# Patient Record
Sex: FEMALE | Race: WHITE | NOT HISPANIC OR LATINO | Employment: OTHER | URBAN - METROPOLITAN AREA
[De-identification: names, ages, dates, MRNs, and addresses within clinical notes are randomized per-mention and may not be internally consistent; named-entity substitution may affect disease eponyms.]

---

## 2018-01-16 NOTE — RESULT NOTES
Message   Mammogram stable- repeat in 1 year  Verified Results  * MAMMO SCREENING BILATERAL W CAD [id number] 03:46PM Pam Oglesby Order Number: [id number]    - Patient Instructions: To schedule this appointment, please contact Central Scheduling at [phone]  Do not wear any perfume, powder, lotion or deodorant on breast or underarm area  Please bring your doctors order, referral (if needed) and insurance information with you on the day of the test  Failure to bring this information may result in this test being rescheduled  Arrive 15 minutes prior to your appointment time to register  On the day of your test, please bring any prior mammogram or breast studies with you that were not performed at a [facility]  Failure to bring prior exams may result in your test needing to be rescheduled  Test Name Result Flag Reference   MAMMO SCREENING BILATERAL W CAD (Report)     Patient History:   Patient is postmenopausal    No known family history of cancer  Took hormonal contraceptives for 10 years  Patient is a former smoker, and smoked for 52 years  Patient's    BMI is 34 3  Reason for exam: screening (asymptomatic)  Mammo Screening Bilateral W CAD: December 12, 2016 - Check In #:    [de-identified]   Bilateral MLO and CC view(s) were taken  Technologist: ALDO Marie (ALDO)(M)   Prior study comparison: July 13, 2015, digital bilateral    screening mammogram performed at [address] [place] [address]  April 9, 2014, digital bilateral screening mammogram performed at   [address] [place] [address]  December 28, 2012, digital    bilateral screening mammogram performed at [facility]  January 25, 2010, right breast unilateral diagnostic    mammogram, performed at [address] [place] [address]  January 15, 2010, digital bilateral screening mammogram performed   at [address] [place] [address]   March 18, 2004, bilateral    screening mammogram performed at [facility]  There are scattered fibroglandular densities  No dominant soft tissue mass, architectural distortion or    suspicious calcifications are noted in either breast  The skin    and nipple contours are within normal limits  No evidence of malignancy  No significant changes when compared with prior studies  ASSESSMENT: BiRad:1 - Negative     Recommendation:   Routine screening mammogram of both breasts in 1 year  A    reminder letter will be scheduled  Analyzed by CAD     8-10% of cancers will be missed on mammography  Management of a    palpable abnormality must be based on clinical grounds  Patients   will be notified of their results via letter from our facility  Accredited by [employer] and FDA       Transcription Location: [facility]: [id number]     Risk Value(s):   Tyrer-Cuzick 10 Year: 2 524%, Tyrer-Cuzick Lifetime: 7 876%,    Myriad Table: 1 5%, JERICA 5 Year: 0 9%, NCI Lifetime: 5 9%   Signed by:   Juliane Ewing MD   12/13/16

## 2018-01-18 NOTE — MISCELLANEOUS
Message  11/14/2016 - 09:38 - left phone message with x-ray report (no fracture)      Signatures   Electronically signed by : Khloe Holm DO; Nov 14 2016  9:39AM EST                       (Author)

## 2018-05-15 RX ORDER — METOPROLOL SUCCINATE 100 MG/1
TABLET, EXTENDED RELEASE ORAL
Qty: 90 TABLET | Refills: 1 | OUTPATIENT
Start: 2018-05-15

## 2018-05-15 RX ORDER — AMLODIPINE BESYLATE 2.5 MG/1
TABLET ORAL
Qty: 90 TABLET | Refills: 1 | OUTPATIENT
Start: 2018-05-15

## 2018-05-25 NOTE — TELEPHONE ENCOUNTER
Per patient, still considers Dr Giron Hang her physician  Is currently out of town will call back to schedule an appt when she returns home  Has enough meds at this time will request refill at the time schedules appt

## 2018-05-31 PROCEDURE — G0145 SCR C/V CYTO,THINLAYER,RESCR: HCPCS | Performed by: OBSTETRICS & GYNECOLOGY

## 2018-05-31 PROCEDURE — 87624 HPV HI-RISK TYP POOLED RSLT: CPT | Performed by: OBSTETRICS & GYNECOLOGY

## 2018-06-01 ENCOUNTER — LAB REQUISITION (OUTPATIENT)
Dept: LAB | Facility: HOSPITAL | Age: 58
End: 2018-06-01
Payer: COMMERCIAL

## 2018-06-01 DIAGNOSIS — Z01.419 ENCOUNTER FOR GYNECOLOGICAL EXAMINATION WITHOUT ABNORMAL FINDING: ICD-10-CM

## 2018-06-05 LAB — HPV RRNA GENITAL QL NAA+PROBE: NORMAL

## 2018-06-06 LAB
LAB AP GYN PRIMARY INTERPRETATION: NORMAL
Lab: NORMAL

## 2018-06-11 PROCEDURE — 88305 TISSUE EXAM BY PATHOLOGIST: CPT | Performed by: PATHOLOGY

## 2018-06-12 ENCOUNTER — LAB REQUISITION (OUTPATIENT)
Dept: LAB | Facility: HOSPITAL | Age: 58
End: 2018-06-12
Payer: COMMERCIAL

## 2018-06-12 DIAGNOSIS — N95.0 POSTMENOPAUSAL BLEEDING: ICD-10-CM

## 2018-06-26 ENCOUNTER — TRANSCRIBE ORDERS (OUTPATIENT)
Dept: ADMINISTRATIVE | Facility: HOSPITAL | Age: 58
End: 2018-06-26

## 2018-06-26 DIAGNOSIS — Z12.39 SCREENING BREAST EXAMINATION: Primary | ICD-10-CM

## 2018-07-09 ENCOUNTER — HOSPITAL ENCOUNTER (OUTPATIENT)
Dept: RADIOLOGY | Age: 58
Discharge: HOME | End: 2018-07-09
Payer: COMMERCIAL

## 2018-07-09 DIAGNOSIS — Z12.39 SCREENING BREAST EXAMINATION: ICD-10-CM

## 2018-07-09 PROCEDURE — 77067 SCR MAMMO BI INCL CAD: CPT

## 2018-07-20 RX ORDER — METOPROLOL SUCCINATE 100 MG/1
100 TABLET, EXTENDED RELEASE ORAL EVERY MORNING
COMMUNITY
End: 2018-07-24 | Stop reason: SDUPTHER

## 2018-07-20 RX ORDER — AMLODIPINE BESYLATE 2.5 MG/1
1 TABLET ORAL EVERY MORNING
COMMUNITY
Start: 2014-03-19 | End: 2018-09-25 | Stop reason: SDUPTHER

## 2018-07-20 RX ORDER — IBUPROFEN 200 MG
400 TABLET ORAL EVERY 6 HOURS PRN
COMMUNITY

## 2018-07-20 RX ORDER — TRIAMTERENE AND HYDROCHLOROTHIAZIDE 37.5; 25 MG/1; MG/1
1 CAPSULE ORAL EVERY MORNING
COMMUNITY
End: 2019-05-10 | Stop reason: SDUPTHER

## 2018-07-23 ENCOUNTER — ANESTHESIA EVENT (OUTPATIENT)
Dept: PERIOP | Facility: HOSPITAL | Age: 58
End: 2018-07-23
Payer: COMMERCIAL

## 2018-07-24 ENCOUNTER — HOSPITAL ENCOUNTER (OUTPATIENT)
Facility: HOSPITAL | Age: 58
Setting detail: OUTPATIENT SURGERY
Discharge: HOME | End: 2018-07-24
Attending: OBSTETRICS & GYNECOLOGY | Admitting: OBSTETRICS & GYNECOLOGY
Payer: COMMERCIAL

## 2018-07-24 ENCOUNTER — ANESTHESIA (OUTPATIENT)
Dept: PERIOP | Facility: HOSPITAL | Age: 58
End: 2018-07-24
Payer: COMMERCIAL

## 2018-07-24 VITALS
HEART RATE: 74 BPM | TEMPERATURE: 98.8 F | OXYGEN SATURATION: 95 % | BODY MASS INDEX: 46.1 KG/M2 | WEIGHT: 270 LBS | RESPIRATION RATE: 20 BRPM | SYSTOLIC BLOOD PRESSURE: 115 MMHG | HEIGHT: 64 IN | DIASTOLIC BLOOD PRESSURE: 61 MMHG

## 2018-07-24 DIAGNOSIS — I10 ESSENTIAL HYPERTENSION: Primary | ICD-10-CM

## 2018-07-24 DIAGNOSIS — N95.0 POSTMENOPAUSAL BLEEDING: ICD-10-CM

## 2018-07-24 PROBLEM — Z98.890 STATUS POST HYSTEROSCOPY: Status: ACTIVE | Noted: 2018-07-24

## 2018-07-24 LAB
BASOPHILS # BLD AUTO: 0.06 THOUSANDS/ΜL (ref 0–0.1)
BASOPHILS NFR BLD AUTO: 1 % (ref 0–1)
EOSINOPHIL # BLD AUTO: 0.24 THOUSAND/ΜL (ref 0–0.61)
EOSINOPHIL NFR BLD AUTO: 3 % (ref 0–6)
ERYTHROCYTE [DISTWIDTH] IN BLOOD BY AUTOMATED COUNT: 13.5 % (ref 11.6–15.1)
HCT VFR BLD AUTO: 44.9 % (ref 34.8–46.1)
HGB BLD-MCNC: 15 G/DL (ref 11.5–15.4)
LYMPHOCYTES # BLD AUTO: 2.36 THOUSANDS/ΜL (ref 0.6–4.47)
LYMPHOCYTES NFR BLD AUTO: 31 % (ref 14–44)
MCH RBC QN AUTO: 29.9 PG (ref 26.8–34.3)
MCHC RBC AUTO-ENTMCNC: 33.4 G/DL (ref 31.4–37.4)
MCV RBC AUTO: 89 FL (ref 82–98)
MONOCYTES # BLD AUTO: 0.51 THOUSAND/ΜL (ref 0.17–1.22)
MONOCYTES NFR BLD AUTO: 7 % (ref 4–12)
NEUTROPHILS # BLD AUTO: 4.48 THOUSANDS/ΜL (ref 1.85–7.62)
NEUTS SEG NFR BLD AUTO: 59 % (ref 43–75)
NRBC BLD AUTO-RTO: 0 /100 WBCS
PLATELET # BLD AUTO: 243 THOUSANDS/UL (ref 149–390)
PMV BLD AUTO: 10.7 FL (ref 8.9–12.7)
RBC # BLD AUTO: 5.02 MILLION/UL (ref 3.81–5.12)
WBC # BLD AUTO: 7.65 THOUSAND/UL (ref 4.31–10.16)

## 2018-07-24 PROCEDURE — 88305 TISSUE EXAM BY PATHOLOGIST: CPT | Performed by: PATHOLOGY

## 2018-07-24 PROCEDURE — 85025 COMPLETE CBC W/AUTO DIFF WBC: CPT | Performed by: OBSTETRICS & GYNECOLOGY

## 2018-07-24 RX ORDER — METOPROLOL SUCCINATE 100 MG/1
100 TABLET, EXTENDED RELEASE ORAL EVERY MORNING
Qty: 90 TABLET | Refills: 1 | OUTPATIENT
Start: 2018-07-24

## 2018-07-24 RX ORDER — ONDANSETRON 2 MG/ML
INJECTION INTRAMUSCULAR; INTRAVENOUS AS NEEDED
Status: DISCONTINUED | OUTPATIENT
Start: 2018-07-24 | End: 2018-07-24 | Stop reason: SURG

## 2018-07-24 RX ORDER — PROPOFOL 10 MG/ML
INJECTION, EMULSION INTRAVENOUS AS NEEDED
Status: DISCONTINUED | OUTPATIENT
Start: 2018-07-24 | End: 2018-07-24 | Stop reason: SURG

## 2018-07-24 RX ORDER — IBUPROFEN 600 MG/1
600 TABLET ORAL EVERY 6 HOURS PRN
Status: DISCONTINUED | OUTPATIENT
Start: 2018-07-24 | End: 2018-07-24 | Stop reason: HOSPADM

## 2018-07-24 RX ORDER — METOPROLOL SUCCINATE 100 MG/1
100 TABLET, EXTENDED RELEASE ORAL EVERY MORNING
Qty: 90 TABLET | Refills: 0 | Status: SHIPPED | OUTPATIENT
Start: 2018-07-24 | End: 2018-09-25 | Stop reason: SDUPTHER

## 2018-07-24 RX ORDER — MIDAZOLAM HYDROCHLORIDE 1 MG/ML
INJECTION INTRAMUSCULAR; INTRAVENOUS AS NEEDED
Status: DISCONTINUED | OUTPATIENT
Start: 2018-07-24 | End: 2018-07-24 | Stop reason: SURG

## 2018-07-24 RX ORDER — SODIUM CHLORIDE 9 MG/ML
125 INJECTION, SOLUTION INTRAVENOUS CONTINUOUS
Status: DISCONTINUED | OUTPATIENT
Start: 2018-07-24 | End: 2018-07-24 | Stop reason: HOSPADM

## 2018-07-24 RX ORDER — FENTANYL CITRATE 50 UG/ML
INJECTION, SOLUTION INTRAMUSCULAR; INTRAVENOUS AS NEEDED
Status: DISCONTINUED | OUTPATIENT
Start: 2018-07-24 | End: 2018-07-24 | Stop reason: SURG

## 2018-07-24 RX ORDER — KETOROLAC TROMETHAMINE 30 MG/ML
INJECTION, SOLUTION INTRAMUSCULAR; INTRAVENOUS AS NEEDED
Status: DISCONTINUED | OUTPATIENT
Start: 2018-07-24 | End: 2018-07-24 | Stop reason: SURG

## 2018-07-24 RX ORDER — OXYCODONE HYDROCHLORIDE AND ACETAMINOPHEN 5; 325 MG/1; MG/1
1 TABLET ORAL EVERY 4 HOURS PRN
Status: DISCONTINUED | OUTPATIENT
Start: 2018-07-24 | End: 2018-07-24 | Stop reason: HOSPADM

## 2018-07-24 RX ORDER — MAGNESIUM HYDROXIDE 1200 MG/15ML
LIQUID ORAL AS NEEDED
Status: DISCONTINUED | OUTPATIENT
Start: 2018-07-24 | End: 2018-07-24 | Stop reason: HOSPADM

## 2018-07-24 RX ORDER — OXYCODONE HYDROCHLORIDE AND ACETAMINOPHEN 5; 325 MG/1; MG/1
2 TABLET ORAL EVERY 4 HOURS PRN
Status: DISCONTINUED | OUTPATIENT
Start: 2018-07-24 | End: 2018-07-24 | Stop reason: HOSPADM

## 2018-07-24 RX ORDER — SODIUM CHLORIDE, SODIUM LACTATE, POTASSIUM CHLORIDE, CALCIUM CHLORIDE 600; 310; 30; 20 MG/100ML; MG/100ML; MG/100ML; MG/100ML
125 INJECTION, SOLUTION INTRAVENOUS CONTINUOUS
Status: DISCONTINUED | OUTPATIENT
Start: 2018-07-24 | End: 2018-07-24 | Stop reason: HOSPADM

## 2018-07-24 RX ADMIN — KETOROLAC TROMETHAMINE 30 MG: 30 INJECTION, SOLUTION INTRAMUSCULAR at 13:51

## 2018-07-24 RX ADMIN — SODIUM CHLORIDE 125 ML/HR: 0.9 INJECTION, SOLUTION INTRAVENOUS at 12:53

## 2018-07-24 RX ADMIN — ONDANSETRON HYDROCHLORIDE 4 MG: 2 INJECTION, SOLUTION INTRAVENOUS at 13:51

## 2018-07-24 RX ADMIN — IBUPROFEN 600 MG: 600 TABLET, FILM COATED ORAL at 15:45

## 2018-07-24 RX ADMIN — FENTANYL CITRATE 50 MCG: 50 INJECTION, SOLUTION INTRAMUSCULAR; INTRAVENOUS at 13:59

## 2018-07-24 RX ADMIN — PROPOFOL 200 MG: 10 INJECTION, EMULSION INTRAVENOUS at 13:41

## 2018-07-24 RX ADMIN — DEXAMETHASONE SODIUM PHOSPHATE 4 MG: 10 INJECTION INTRAMUSCULAR; INTRAVENOUS at 13:55

## 2018-07-24 RX ADMIN — MIDAZOLAM 2 MG: 1 INJECTION INTRAMUSCULAR; INTRAVENOUS at 13:36

## 2018-07-24 RX ADMIN — FENTANYL CITRATE 50 MCG: 50 INJECTION, SOLUTION INTRAMUSCULAR; INTRAVENOUS at 13:41

## 2018-07-24 NOTE — ANESTHESIA PREPROCEDURE EVALUATION
Review of Systems/Medical History  Patient summary reviewed  Chart reviewed      Cardiovascular  Hypertension on > 1 medication, Valvular heart disease , mitral regurgitation,    Pulmonary       GI/Hepatic    GERD (NO MEDS) well controlled,        Negative  ROS        Endo/Other    Obesity  morbid obesity   GYN  Negative gynecology ROS          Hematology  Negative hematology ROS      Musculoskeletal  Negative musculoskeletal ROS        Neurology  Negative neurology ROS      Psychology   Negative psychology ROS              Physical Exam    Airway    Mallampati score: II  TM Distance: >3 FB  Neck ROM: full     Dental   No notable dental hx     Cardiovascular  Rhythm: regular, Rate: normal, Cardiovascular exam normal    Pulmonary  Pulmonary exam normal Breath sounds clear to auscultation,     Other Findings        Anesthesia Plan  ASA Score- 3     Anesthesia Type- general with ASA Monitors  Additional Monitors:   Airway Plan:         Plan Factors-Patient not instructed to abstain from smoking on day of procedure  Patient did not smoke on day of surgery  Induction- intravenous  Postoperative Plan-     Informed Consent- Anesthetic plan and risks discussed with patient

## 2018-07-24 NOTE — DISCHARGE INSTRUCTIONS
Dilation and Curettage   WHAT YOU NEED TO KNOW:   Dilation and curettage (D&C) is a procedure to remove tissue from the lining of your uterus  DISCHARGE INSTRUCTIONS:   Call 911 for any of the following:   · You have signs of an allergic reaction, such as hives, trouble breathing, or severe swelling  Seek care immediately if:   · You have heavy vaginal bleeding that soaks 1 pad in 1 hour for 2 hours in a row  · You have a fever higher than 100 4°F (38°C)  · You have abdominal cramps for more than 2 days  · Your pain does not get better, even after treatment  Contact your healthcare provider if:   · You have foul-smelling vaginal discharge  · You do not get your monthly period  · You feel depressed or anxious  · You feel very tired and weak  · You have questions or concerns about your condition or care  Medicines: You may need any of the following:  · Prescription pain medicine  may be given  Do not wait until the pain is severe before you take your medicine  Ask your healthcare provider how to take this medicine safely  · Antibiotics  help fight or prevent a bacterial infection  · Take your medicine as directed  Contact your healthcare provider if you think your medicine is not helping or if you have side effects  Tell him or her if you are allergic to any medicine  Keep a list of the medicines, vitamins, and herbs you take  Include the amounts, and when and why you take them  Bring the list or the pill bottles to follow-up visits  Carry your medicine list with you in case of an emergency  Self-care:   · Use sanitary pads if needed  You may have light bleeding for up to 2 weeks  Do not use tampons  Use sanitary pads instead  This will help prevent a vaginal infection  · Rest as needed  Slowly start to do more each day  Return to your daily activities as directed  · Do not have sex for at least 2 weeks after the procedure    This will help prevent an infection  · Use birth control right after your procedure  Your monthly period should start again in 4 to 8 weeks  During this time, you could still ovulate (release an egg)  Use birth control as directed to prevent pregnancy during this time  Follow up with your healthcare provider as directed:  Write down your questions so you remember to ask them during your visits  © 2017 [employer]  Information is for End User's use only and may not be sold, redistributed or otherwise used for commercial purposes  All illustrations and images included in CareNotes® are the copyrighted property of [employer]  or Silvino Miller  The above information is an  only  It is not intended as medical advice for individual conditions or treatments  Talk to your doctor, nurse or pharmacist before following any medical regimen to see if it is safe and effective for you

## 2018-07-24 NOTE — OP NOTE
OPERATIVE REPORT  PATIENT NAME: Lloyd Morin    :  1960  MRN: [id number]  Pt Location: [facility] OR ROOM 03    SURGERY DATE: 2018    Surgeon(s) and Role:     * Montrell Braga DO - Primary   *Risa Thompson MD - Assistant    Preop Diagnosis:  Postmenopausal bleeding [N95 0]    Post-Op Diagnosis Codes:     * Postmenopausal bleeding [N95 0]    Procedure(s) (LRB):  DILATATION AND CURETTAGE (D&C) WITH HYSTEROSCOPY (N/A)    Specimen(s):  ID Type Source Tests Collected by Time Destination   1 : [facility] Tissue Endometrium TISSUE EXAM Montrell Braga DO 2018 1405        Estimated Blood Loss:   Minimal    Drains: 20cc urine       Anesthesia Type:   LMA    Operative Indications:  Postmenopausal bleeding [N95 0]      Operative Findings:  Patent ostia bilaterally  Grossly normal atrophic appearing endometrium  No polyps or fibroids visualized  Complications:   None    Procedure and Technique:    Brief History    All risks, benefits, and alternatives to the procedure were discussed with the patient and she had the opportunity to ask questions  Informed consent was obtained  Description of Procedure    Patient was taken to the operating room were a time out was performed to confirm correct patient and correct procedure  General LMA anesthesia (LMA) was administered and the patient was positioned on the OR table in the dorsal lithotomy position  All pressure points were padded and a jenny hugger was placed to maintain control of core body temperature  A bimanual exam was performed and the uterus was noted to be anteverted, normal in size and consistency with no palpable adnexal masses or fullness  The patient was prepped and draped in the usual sterile fashion  Operative Technique    A straight catheter was introduced into the bladder, which was drained of 20cc of clear yellow urine   A weighted speculum was inserted into the vagina and a Bandar retractor was used to visualize the anterior lip of the cervix, which was then grasped with a single toothed tenaculum  The uterus was sounded to 7cm  The cervix was serially dilated to 16Fr using Jamel dilators for introduction of the hysteroscope  Hysteroscope was introduced under direct visualization using normal saline solution as the distention media  Hysteroscope was advanced to the uterine fundus and the entire uterine cavity was inspected in a systematic manner  Findings were as noted above  Hysteroscope was withdrawn and sharp curetting was performed, starting at the 12'oclock position and rotating a total of 360 degrees to cover all surfaces  Endometrial tissue was obtained and sent for pathology  The single toothed tenaculum was removed from the anterior lip of the cervix  Good hemostasis was confirmed at the tenaculum puncture sites  Weighted speculum was then removed from the vagina  At the conclusion of the procedure, all needle, sponge, and instrument counts were noted to be correct x2  Patient tolerated the procedure well and was transferred to PACU in stable condition prior to discharge with follow up in 1-2 weeks  Dr Low Villa was present and participated in all key portions of the case        Patient Disposition:  PACU     SIGNATURE: Mayte Espinosa MD  DATE: July 24, 2018  TIME: 2:33 PM

## 2018-07-24 NOTE — TELEPHONE ENCOUNTER
Pt not seen in 2 years-  It does appear she has a procedure scheduled for today so maybe call her later in the week

## 2018-08-13 DIAGNOSIS — I10 ESSENTIAL HYPERTENSION: Primary | ICD-10-CM

## 2018-08-13 RX ORDER — TRIAMTERENE AND HYDROCHLOROTHIAZIDE 37.5; 25 MG/1; MG/1
TABLET ORAL
Qty: 90 TABLET | Refills: 1 | Status: SHIPPED | OUTPATIENT
Start: 2018-08-13 | End: 2018-09-25 | Stop reason: SDUPTHER

## 2018-09-24 RX ORDER — ASPIRIN 325 MG
1 TABLET ORAL DAILY
COMMUNITY
Start: 2012-11-12 | End: 2019-09-10

## 2018-09-24 RX ORDER — MULTIVIT-MIN/IRON/FOLIC ACID/K 18-600-40
1 CAPSULE ORAL DAILY
COMMUNITY

## 2018-09-25 ENCOUNTER — OFFICE VISIT (OUTPATIENT)
Dept: FAMILY MEDICINE CLINIC | Facility: CLINIC | Age: 58
End: 2018-09-25
Payer: COMMERCIAL

## 2018-09-25 VITALS
HEART RATE: 100 BPM | DIASTOLIC BLOOD PRESSURE: 60 MMHG | WEIGHT: 273.6 LBS | SYSTOLIC BLOOD PRESSURE: 104 MMHG | TEMPERATURE: 96.9 F | HEIGHT: 64 IN | BODY MASS INDEX: 46.71 KG/M2

## 2018-09-25 DIAGNOSIS — I10 ESSENTIAL HYPERTENSION: Primary | ICD-10-CM

## 2018-09-25 DIAGNOSIS — Z12.11 SCREEN FOR COLON CANCER: ICD-10-CM

## 2018-09-25 DIAGNOSIS — E55.9 VITAMIN D DEFICIENCY: ICD-10-CM

## 2018-09-25 PROCEDURE — 99396 PREV VISIT EST AGE 40-64: CPT | Performed by: INTERNAL MEDICINE

## 2018-09-25 RX ORDER — METOPROLOL SUCCINATE 100 MG/1
100 TABLET, EXTENDED RELEASE ORAL EVERY MORNING
Qty: 90 TABLET | Refills: 0 | Status: SHIPPED | OUTPATIENT
Start: 2018-09-25 | End: 2019-01-16 | Stop reason: SDUPTHER

## 2018-09-25 RX ORDER — TRIAMTERENE AND HYDROCHLOROTHIAZIDE 37.5; 25 MG/1; MG/1
1 TABLET ORAL DAILY
Qty: 90 TABLET | Refills: 0 | Status: SHIPPED | OUTPATIENT
Start: 2018-09-25 | End: 2019-05-10 | Stop reason: SDUPTHER

## 2018-09-25 RX ORDER — AMLODIPINE BESYLATE 2.5 MG/1
2.5 TABLET ORAL EVERY MORNING
Qty: 90 TABLET | Refills: 0 | Status: SHIPPED | OUTPATIENT
Start: 2018-09-25 | End: 2018-12-30 | Stop reason: SDUPTHER

## 2018-09-25 NOTE — PROGRESS NOTES
ASSESSMENT and PLAN:  Rome Hernández is a 62 y o  female with:   Problem List Items Addressed This Visit     Hypertension - Primary     bp stable lose weight          Relevant Medications    metoprolol succinate (TOPROL-XL) 100 mg 24 hr tablet    triamterene-hydrochlorothiazide (MAXZIDE-25) 37 5-25 mg per tablet    amLODIPine (NORVASC) 2 5 mg tablet    Other Relevant Orders    Comprehensive metabolic panel    HEMOGLOBIN A1C W/ EAG ESTIMATION    Lipid panel    TSH, 3rd generation    Vitamin D 25 hydroxy    Vitamin D deficiency     Check level         Screen for colon cancer    Relevant Orders    Occult Blood, Fecal Immunochemical          SUBJECTIVE:  Rome Hernández is a 62 y o  female who presents today with a chief complaint of Hypertension (medication refills)    Patient here for med refill and annual physical  She is worred about her weight; she is not dieting  She is not exercising  She is due for colono  She did see gyn- had d and c  She has family hx of dm2   She gets flu shot at work      Review of Systems   Constitutional: Negative  HENT: Negative  Eyes: Negative  Respiratory: Negative  Cardiovascular: Negative  Gastrointestinal: Negative  Endocrine: Negative  Genitourinary: Negative  Musculoskeletal: Negative  Skin: Negative  Allergic/Immunologic: Negative  Neurological: Negative  Hematological: Negative  Psychiatric/Behavioral: Negative  I have reviewed the patient's PMH, Social History, Medication List and Allergies  OBJECTIVE:  /60 (BP Location: Left arm, Patient Position: Sitting, Cuff Size: Large)   Pulse 100   Temp (!) 96 9 °F (36 1 °C)   Ht 5' 4" (1 626 m)   Wt 124 kg (273 lb 9 6 oz)   Breastfeeding? No   BMI 46 96 kg/m²   Physical Exam   Constitutional: She is oriented to person, place, and time  She appears well-developed and well-nourished  HENT:   Head: Normocephalic     Right Ear: External ear normal    Left Ear: External ear normal    Nose: Nose normal    Mouth/Throat: Oropharynx is clear and moist    Eyes: Conjunctivae and EOM are normal  Pupils are equal, round, and reactive to light  No scleral icterus  Neck: Normal range of motion  Neck supple  No JVD present  No tracheal deviation present  No thyromegaly present  Cardiovascular: Normal rate, regular rhythm, normal heart sounds and intact distal pulses  Pulmonary/Chest: Effort normal and breath sounds normal  No respiratory distress  She has no wheezes  She has no rales  Abdominal: Soft  Bowel sounds are normal  She exhibits no distension  There is no tenderness  Musculoskeletal: Normal range of motion  She exhibits no edema or tenderness  Neurological: She is alert and oriented to person, place, and time  Skin: Skin is warm and dry  Psychiatric: She has a normal mood and affect  Her behavior is normal  Judgment normal    Nursing note and vitals reviewed

## 2018-10-19 LAB — HBA1C MFR BLD HPLC: 6.1 %

## 2018-10-29 ENCOUNTER — TELEPHONE (OUTPATIENT)
Dept: FAMILY MEDICINE CLINIC | Facility: CLINIC | Age: 58
End: 2018-10-29

## 2018-10-29 DIAGNOSIS — E55.9 VITAMIN D DEFICIENCY: Primary | ICD-10-CM

## 2018-10-30 RX ORDER — ERGOCALCIFEROL 1.25 MG/1
50000 CAPSULE ORAL WEEKLY
Qty: 8 CAPSULE | Refills: 0 | Status: SHIPPED | OUTPATIENT
Start: 2018-10-30 | End: 2021-02-22 | Stop reason: ALTCHOICE

## 2018-10-30 NOTE — TELEPHONE ENCOUNTER
She can continue that and I am also sending in high dose Vit D supplements, 50,000 IU to take once/week    Thanks

## 2018-10-30 NOTE — TELEPHONE ENCOUNTER
Please call Will Cristian and let her know that her labs showed:  1  She has pre-diabetes, she should decrease carbs in her diet and try to lose weight  2  Her Vit D is low, once she gets the message, I will send in high dose weekly supplements  3  Her electrolytes, kidney function, liver function, thyroid and cholesterol are all in the normal range! Thank you! No visits with results within 1 Week(s) from this visit     Latest known visit with results is:   Orders Only on 10/19/2018   Component Date Value Ref Range Status    Hemoglobin A1C 10/19/2018 6 1   Final

## 2018-10-30 NOTE — TELEPHONE ENCOUNTER
Pt called back and received message   She states she already takes 2000 units of vitamin almost daily(sometimes she forgets!)

## 2018-12-30 DIAGNOSIS — I10 ESSENTIAL HYPERTENSION: ICD-10-CM

## 2018-12-31 RX ORDER — AMLODIPINE BESYLATE 2.5 MG/1
2.5 TABLET ORAL EVERY MORNING
Qty: 90 TABLET | Refills: 0 | Status: SHIPPED | OUTPATIENT
Start: 2018-12-31 | End: 2019-04-24 | Stop reason: SDUPTHER

## 2019-01-16 DIAGNOSIS — I10 ESSENTIAL HYPERTENSION: ICD-10-CM

## 2019-01-17 RX ORDER — METOPROLOL SUCCINATE 100 MG/1
100 TABLET, EXTENDED RELEASE ORAL EVERY MORNING
Qty: 90 TABLET | Refills: 0 | Status: SHIPPED | OUTPATIENT
Start: 2019-01-17 | End: 2019-04-15 | Stop reason: SDUPTHER

## 2019-01-18 DIAGNOSIS — E55.9 VITAMIN D DEFICIENCY: ICD-10-CM

## 2019-01-18 RX ORDER — ERGOCALCIFEROL 1.25 MG/1
50000 CAPSULE ORAL WEEKLY
Qty: 8 CAPSULE | Refills: 0 | OUTPATIENT
Start: 2019-01-18 | End: 2019-03-09

## 2019-04-15 DIAGNOSIS — I10 ESSENTIAL HYPERTENSION: ICD-10-CM

## 2019-04-15 RX ORDER — METOPROLOL SUCCINATE 100 MG/1
TABLET, EXTENDED RELEASE ORAL
Qty: 90 TABLET | Refills: 0 | Status: SHIPPED | OUTPATIENT
Start: 2019-04-15 | End: 2019-07-15 | Stop reason: SDUPTHER

## 2019-04-24 DIAGNOSIS — I10 ESSENTIAL HYPERTENSION: ICD-10-CM

## 2019-04-24 RX ORDER — AMLODIPINE BESYLATE 2.5 MG/1
2.5 TABLET ORAL EVERY MORNING
Qty: 90 TABLET | Refills: 1 | Status: SHIPPED | OUTPATIENT
Start: 2019-04-24 | End: 2020-02-21

## 2019-05-10 DIAGNOSIS — I10 ESSENTIAL HYPERTENSION: Primary | ICD-10-CM

## 2019-05-10 RX ORDER — TRIAMTERENE AND HYDROCHLOROTHIAZIDE 37.5; 25 MG/1; MG/1
1 CAPSULE ORAL EVERY MORNING
Qty: 90 CAPSULE | Refills: 1 | Status: SHIPPED | OUTPATIENT
Start: 2019-05-10 | End: 2019-11-10

## 2019-05-10 RX ORDER — TRIAMTERENE AND HYDROCHLOROTHIAZIDE 37.5; 25 MG/1; MG/1
1 TABLET ORAL DAILY
Qty: 90 TABLET | Refills: 0 | Status: SHIPPED | OUTPATIENT
Start: 2019-05-10 | End: 2019-08-06 | Stop reason: SDUPTHER

## 2019-07-15 DIAGNOSIS — I10 ESSENTIAL HYPERTENSION: ICD-10-CM

## 2019-07-15 RX ORDER — METOPROLOL SUCCINATE 100 MG/1
TABLET, EXTENDED RELEASE ORAL
Qty: 90 TABLET | Refills: 0 | Status: SHIPPED | OUTPATIENT
Start: 2019-07-15 | End: 2019-11-16 | Stop reason: SDUPTHER

## 2019-08-06 DIAGNOSIS — I10 ESSENTIAL HYPERTENSION: ICD-10-CM

## 2019-08-06 RX ORDER — TRIAMTERENE AND HYDROCHLOROTHIAZIDE 37.5; 25 MG/1; MG/1
TABLET ORAL
Qty: 90 TABLET | Refills: 0 | Status: SHIPPED | OUTPATIENT
Start: 2019-08-06 | End: 2019-11-05 | Stop reason: ALTCHOICE

## 2019-08-29 ENCOUNTER — APPOINTMENT (EMERGENCY)
Dept: RADIOLOGY | Facility: HOSPITAL | Age: 59
End: 2019-08-29
Payer: COMMERCIAL

## 2019-08-29 ENCOUNTER — HOSPITAL ENCOUNTER (EMERGENCY)
Facility: HOSPITAL | Age: 59
Discharge: HOME | End: 2019-08-29
Attending: EMERGENCY MEDICINE | Admitting: EMERGENCY MEDICINE
Payer: COMMERCIAL

## 2019-08-29 VITALS
RESPIRATION RATE: 18 BRPM | SYSTOLIC BLOOD PRESSURE: 117 MMHG | HEART RATE: 66 BPM | WEIGHT: 268.96 LBS | BODY MASS INDEX: 45.92 KG/M2 | DIASTOLIC BLOOD PRESSURE: 56 MMHG | OXYGEN SATURATION: 96 % | HEIGHT: 64 IN | TEMPERATURE: 98 F

## 2019-08-29 DIAGNOSIS — R07.9 CHEST PAIN: ICD-10-CM

## 2019-08-29 DIAGNOSIS — S92.309A METATARSAL FRACTURE: Primary | ICD-10-CM

## 2019-08-29 LAB
ALBUMIN SERPL BCP-MCNC: 4.1 G/DL (ref 3.5–5)
ALP SERPL-CCNC: 66 U/L (ref 46–116)
ALT SERPL W P-5'-P-CCNC: 39 U/L (ref 12–78)
ANION GAP SERPL CALCULATED.3IONS-SCNC: 10 MMOL/L (ref 4–13)
AST SERPL W P-5'-P-CCNC: 21 U/L (ref 5–45)
BASOPHILS # BLD AUTO: 0.11 THOUSANDS/ΜL (ref 0–0.1)
BASOPHILS NFR BLD AUTO: 1 % (ref 0–1)
BILIRUB SERPL-MCNC: 0.4 MG/DL (ref 0.2–1)
BUN SERPL-MCNC: 23 MG/DL (ref 5–25)
CALCIUM SERPL-MCNC: 9.3 MG/DL (ref 8.3–10.1)
CHLORIDE SERPL-SCNC: 105 MMOL/L (ref 100–108)
CO2 SERPL-SCNC: 25 MMOL/L (ref 21–32)
CREAT SERPL-MCNC: 1.16 MG/DL (ref 0.6–1.3)
EOSINOPHIL # BLD AUTO: 0.37 THOUSAND/ΜL (ref 0–0.61)
EOSINOPHIL NFR BLD AUTO: 4 % (ref 0–6)
ERYTHROCYTE [DISTWIDTH] IN BLOOD BY AUTOMATED COUNT: 13 % (ref 11.6–15.1)
GFR SERPL CREATININE-BSD FRML MDRD: 52 ML/MIN/1.73SQ M
GLUCOSE SERPL-MCNC: 95 MG/DL (ref 65–140)
HCT VFR BLD AUTO: 45.6 % (ref 34.8–46.1)
HGB BLD-MCNC: 14.9 G/DL (ref 11.5–15.4)
IMM GRANULOCYTES # BLD AUTO: 0.03 THOUSAND/UL (ref 0–0.2)
IMM GRANULOCYTES NFR BLD AUTO: 0 % (ref 0–2)
LYMPHOCYTES # BLD AUTO: 3.28 THOUSANDS/ΜL (ref 0.6–4.47)
LYMPHOCYTES NFR BLD AUTO: 36 % (ref 14–44)
MCH RBC QN AUTO: 29.6 PG (ref 26.8–34.3)
MCHC RBC AUTO-ENTMCNC: 32.7 G/DL (ref 31.4–37.4)
MCV RBC AUTO: 91 FL (ref 82–98)
MONOCYTES # BLD AUTO: 0.6 THOUSAND/ΜL (ref 0.17–1.22)
MONOCYTES NFR BLD AUTO: 7 % (ref 4–12)
NEUTROPHILS # BLD AUTO: 4.69 THOUSANDS/ΜL (ref 1.85–7.62)
NEUTS SEG NFR BLD AUTO: 52 % (ref 43–75)
NRBC BLD AUTO-RTO: 0 /100 WBCS
PLATELET # BLD AUTO: 279 THOUSANDS/UL (ref 149–390)
PMV BLD AUTO: 10.2 FL (ref 8.9–12.7)
POTASSIUM SERPL-SCNC: 3.6 MMOL/L (ref 3.5–5.3)
PROT SERPL-MCNC: 8.2 G/DL (ref 6.4–8.2)
RBC # BLD AUTO: 5.03 MILLION/UL (ref 3.81–5.12)
SODIUM SERPL-SCNC: 140 MMOL/L (ref 136–145)
TROPONIN I SERPL-MCNC: <0.02 NG/ML
WBC # BLD AUTO: 9.08 THOUSAND/UL (ref 4.31–10.16)

## 2019-08-29 PROCEDURE — 71046 X-RAY EXAM CHEST 2 VIEWS: CPT

## 2019-08-29 PROCEDURE — 93005 ELECTROCARDIOGRAM TRACING: CPT

## 2019-08-29 PROCEDURE — 99283 EMERGENCY DEPT VISIT LOW MDM: CPT | Performed by: INTERNAL MEDICINE

## 2019-08-29 PROCEDURE — 84484 ASSAY OF TROPONIN QUANT: CPT | Performed by: EMERGENCY MEDICINE

## 2019-08-29 PROCEDURE — 36415 COLL VENOUS BLD VENIPUNCTURE: CPT

## 2019-08-29 PROCEDURE — 99285 EMERGENCY DEPT VISIT HI MDM: CPT

## 2019-08-29 PROCEDURE — 73630 X-RAY EXAM OF FOOT: CPT

## 2019-08-29 PROCEDURE — 85025 COMPLETE CBC W/AUTO DIFF WBC: CPT | Performed by: EMERGENCY MEDICINE

## 2019-08-29 PROCEDURE — 80053 COMPREHEN METABOLIC PANEL: CPT | Performed by: EMERGENCY MEDICINE

## 2019-08-29 RX ORDER — NAPROXEN 500 MG/1
500 TABLET ORAL 2 TIMES DAILY WITH MEALS
Qty: 30 TABLET | Refills: 0 | Status: SHIPPED | OUTPATIENT
Start: 2019-08-29 | End: 2019-11-05 | Stop reason: ALTCHOICE

## 2019-08-30 LAB
ATRIAL RATE: 67 BPM
P AXIS: 39 DEGREES
PR INTERVAL: 150 MS
QRS AXIS: 10 DEGREES
QRSD INTERVAL: 84 MS
QT INTERVAL: 394 MS
QTC INTERVAL: 416 MS
T WAVE AXIS: 44 DEGREES
VENTRICULAR RATE: 67 BPM

## 2019-08-30 PROCEDURE — 93010 ELECTROCARDIOGRAM REPORT: CPT | Performed by: INTERNAL MEDICINE

## 2019-08-30 NOTE — DISCHARGE INSTRUCTIONS
Please follow up with cardiology regarding your chest pain, return to the ER if it becomes associated with activity or exertion, nausea, vomiting or feeling faint/sweaty or if you have any new concerns  Please follow up with orthopedics regarding your foot fracture  Stay off of it until they evaluate you

## 2019-08-30 NOTE — ED PROVIDER NOTES
History  Chief Complaint   Patient presents with    Foot Injury     Pt reports missing a step and twisting her R foot   Chest Pain     Pt states chest heaviness/pain on L side starting earlier today, states she planned on coming in after work, but then injured her foot as well  +nausea      This 51-year-old female with a history of hypertension and mitral regurgitation presented to the emergency department for evaluation of chest discomfort and heaviness with shortness of breath  She also had a fall earlier today which resulted in a right foot injury  She describes her chest pain as a left-sided discomfort/heaviness, which was constant today  She also noticed that her skin was cold and clammy, and she was feeling nauseated this morning  She did not have shortness of breath with the onset of these symptoms  However, today she had brief period of shortness of breath 15 seconds while she was washing her have which is unusual for her  She can usually climb 2 flights of status before she gets short of breath  She has been having on and off chest discomfort unrelated to exertion for the past couple of years, however she did not think much of it  She also has on and off palpitations for a few years  She denies orthopnea, paroxysmal nocturnal dyspnea, fever, chills, cough  There is occasional wheezing which she believes is exercise induced  No recent trauma to her chest   She has not been diagnosed of GERD, however there have been occasional reflux and she believes she could be having reflux disease as well  She also had a fall today after missing a step  She has been experiencing right ankle pain, with occasional throbbing in her toes  She denies numbness or tingling of her toes, or excessive pain at rest  She was able to bear weight on her right foot with support after the fall      Heart score - 3 (low risk)      History provided by:  Patient   used: No        Prior to Admission Medications   Prescriptions Last Dose Informant Patient Reported? Taking? Cholecalciferol (VITAMIN D) 2000 units CAPS 8/29/2019 at Unknown time  Yes Yes   Sig: Take 1 tablet by mouth daily   amLODIPine (NORVASC) 2 5 mg tablet 8/29/2019 at Unknown time  No Yes   Sig: TAKE 1 TABLET (2 5 MG TOTAL) BY MOUTH EVERY MORNING   aspirin 325 mg tablet 8/29/2019 at Unknown time  Yes Yes   Sig: Take 1 tablet by mouth daily   ergocalciferol (VITAMIN D2) 50,000 units   No No   Sig: Take 1 capsule (50,000 Units total) by mouth once a week for 8 doses   ibuprofen (MOTRIN) 200 mg tablet 8/29/2019 at Unknown time  Yes Yes   Sig: Take 400 mg by mouth every 6 (six) hours as needed for mild pain   metoprolol succinate (TOPROL-XL) 100 mg 24 hr tablet 8/29/2019 at Unknown time  No Yes   Sig: TAKE 1 TABLET BY MOUTH EVERY DAY IN THE MORNING   triamterene-hydrochlorothiazide (DYAZIDE) 37 5-25 mg per capsule 8/29/2019 at Unknown time  No Yes   Sig: Take 1 capsule by mouth every morning   triamterene-hydrochlorothiazide (MAXZIDE-25) 37 5-25 mg per tablet 8/29/2019 at Unknown time  No Yes   Sig: TAKE 1 TABLET BY MOUTH EVERY DAY      Facility-Administered Medications: None       Past Medical History:   Diagnosis Date    GERD (gastroesophageal reflux disease)     No medications    History of cervical dysplasia     History cryosurgery    Hypertension     Mitral regurgitation     Obesity     Postmenopausal bleeding     Rash     On left upper thigh and under abdominal fold- is seen by dermatologist    Stress incontinence     Thrombophlebitis Early 1990's    Superficial, right calf, from birth control pill    Wears glasses        Past Surgical History:   Procedure Laterality Date    [place] HYSTEROSCOPY,W/ENDO BX N/A 7/24/2018    Procedure: DILATATION AND CURETTAGE (D&C) WITH HYSTEROSCOPY;  Surgeon:  Moni Perez DO;  Location: [facility];  Service: Gynecology    WISDOM TOOTH EXTRACTION         Family History   Problem Relation Age of Onset    Coronary artery disease Sister         started with CAD in her 42's     I have reviewed and agree with the history as documented  Social History     Tobacco Use    Smoking status: Former Smoker     Packs/day: 0 75     Years: 20 00     Pack years: 15 00     Types: Cigarettes     Last attempt to quit:      Years since quittin 6    Smokeless tobacco: Never Used    Tobacco comment: 1 ppd x15 years    Substance Use Topics    Alcohol use: No     Comment: being a social drinker noted in "allscripts"     Drug use: No        Review of Systems   Constitutional: Positive for fatigue  Negative for activity change, appetite change, diaphoresis, fever and unexpected weight change  HENT: Negative  Negative for congestion and rhinorrhea  Eyes: Negative  Negative for photophobia and visual disturbance  Respiratory: Positive for chest tightness (Left-sided) and shortness of breath  Negative for cough, choking, wheezing ( occasional wheezing) and stridor  Cardiovascular: Positive for palpitations (for years  Has not been diagnosed of arrhythmias)  Negative for chest pain and leg swelling  Gastrointestinal: Positive for nausea  Negative for constipation, diarrhea (once this morning) and vomiting  Endocrine: Negative  Genitourinary: Negative  Negative for dysuria, enuresis, flank pain, frequency, hematuria and urgency  Musculoskeletal: Negative  Negative for arthralgias and myalgias  Pain in the lateral aspect of the foot following a fall   Skin: Negative  Negative for rash  Allergic/Immunologic: Negative  Neurological: Negative  Negative for dizziness, numbness and headaches  Hematological: Negative  Psychiatric/Behavioral: Negative  All other systems reviewed and are negative        Physical Exam  ED Triage Vitals   Temperature Pulse Respirations Blood Pressure SpO2   19   97 8 °F (36 6 °C) 68 18 157/75 97 %      Temp Source Heart Rate Source Patient Position - Orthostatic VS BP Location FiO2 (%)   08/29/19 2001 08/29/19 1958 08/29/19 1958 08/29/19 1958 --   Oral Monitor Sitting Left arm       Pain Score       08/29/19 1958       6             Orthostatic Vital Signs  Vitals:    08/29/19 1958 08/29/19 2015   BP: 157/75 (!) 172/81   Pulse: 68 80   Patient Position - Orthostatic VS: Sitting Sitting       Physical Exam   Constitutional: She is oriented to person, place, and time  She appears well-developed  HENT:   Head: Normocephalic and atraumatic  Eyes: Pupils are equal, round, and reactive to light  EOM are normal    Neck: Normal range of motion  Cardiovascular: Normal rate, regular rhythm, intact distal pulses and normal pulses  No murmur heard  Pulses:       Dorsalis pedis pulses are 2+ on the right side, and 2+ on the left side  Posterior tibial pulses are 2+ on the right side, and 2+ on the left side  Soft heart sounds   Pulmonary/Chest: Effort normal and breath sounds normal  She has no wheezes  She has no rales  Abdominal: Soft  Bowel sounds are normal  She exhibits no distension  There is no tenderness  Musculoskeletal: Normal range of motion  There is tenderness to palpation on the lateral aspect of her right foot  No color change  Intact distal pulses  No sensory loss  Neurological: She is alert and oriented to person, place, and time  Skin: Skin is warm  Capillary refill takes less than 2 seconds  No rash noted  Nursing note and vitals reviewed        ED Medications  Medications - No data to display    Diagnostic Studies  Results Reviewed     Procedure Component Value Units Date/Time    Troponin I [[id number]]  (Normal) Collected:  08/29/19 2034    Lab Status:  Final result Specimen:  Blood from Arm, Right Updated:  08/29/19 2118     Troponin I <0 02 ng/mL     Comprehensive metabolic panel [[id number]] Collected:  08/29/19 2034    Lab Status:  Final result Specimen:  Blood from Arm, Right Updated:  08/29/19 2056     Sodium 140 mmol/L      Potassium 3 6 mmol/L      Chloride 105 mmol/L      CO2 25 mmol/L      ANION GAP 10 mmol/L      BUN 23 mg/dL      Creatinine 1 16 mg/dL      Glucose 95 mg/dL      Calcium 9 3 mg/dL      AST 21 U/L      ALT 39 U/L      Alkaline Phosphatase 66 U/L      Total Protein 8 2 g/dL      Albumin 4 1 g/dL      Total Bilirubin 0 40 mg/dL      eGFR 52 ml/min/1 73sq m     Narrative:       [place] guidelines for Chronic Kidney Disease (CKD):     Stage 1 with normal or high GFR (GFR > 90 mL/min/1 73 square meters)    Stage 2 Mild CKD (GFR = 60-89 mL/min/1 73 square meters)    Stage 3A Moderate CKD (GFR = 45-59 mL/min/1 73 square meters)    Stage 3B Moderate CKD (GFR = 30-44 mL/min/1 73 square meters)    Stage 4 Severe CKD (GFR = 15-29 mL/min/1 73 square meters)    Stage 5 End Stage CKD (GFR <15 mL/min/1 73 square meters)  Note: GFR calculation is accurate only with a steady state creatinine    CBC and differential [[id number]]  (Abnormal) Collected:  08/29/19 2034    Lab Status:  Final result Specimen:  Blood from Arm, Right Updated:  08/29/19 2039     WBC 9 08 Thousand/uL      RBC 5 03 Million/uL      Hemoglobin 14 9 g/dL      Hematocrit 45 6 %      MCV 91 fL      MCH 29 6 pg      MCHC 32 7 g/dL      RDW 13 0 %      MPV 10 2 fL      Platelets 690 Thousands/uL      nRBC 0 /100 WBCs      Neutrophils Relative 52 %      Immat GRANS % 0 %      Lymphocytes Relative 36 %      Monocytes Relative 7 %      Eosinophils Relative 4 %      Basophils Relative 1 %      Neutrophils Absolute 4 69 Thousands/µL      Immature Grans Absolute 0 03 Thousand/uL      Lymphocytes Absolute 3 28 Thousands/µL      Monocytes Absolute 0 60 Thousand/µL      Eosinophils Absolute 0 37 Thousand/µL      Basophils Absolute 0 11 Thousands/µL     Protime-INR [[id number]]     Lab Status:  No result Specimen:  Blood     APTT [[id number]]     Lab Status:  No result Specimen:  Blood                  XR foot 3+ views RIGHT    (Results Pending)   XR chest 2 views    (Results Pending)         Procedures  Procedures        ED Course  ED Course as of Aug 29 2157   Thu Aug 29, 2019   2146 X ray right foot - Fracture (davies fracture) in the 5th metatarsal bone  MDM  Number of Diagnoses or Management Options     Amount and/or Complexity of Data Reviewed  Clinical lab tests: ordered  Tests in the radiology section of CPT®: ordered  Tests in the medicine section of CPT®: ordered        Disposition  Final diagnoses:   None     ED Disposition     None      Follow-up Information    None         Patient's Medications   Discharge Prescriptions    No medications on file     No discharge procedures on file  ED Provider  Attending physically available and evaluated [facility]  ANNIKA managed the patient along with the ED Attending      Electronically Signed by         Cole Be MD  08/30/19 1181

## 2019-09-03 ENCOUNTER — HOSPITAL ENCOUNTER (OUTPATIENT)
Dept: RADIOLOGY | Facility: HOSPITAL | Age: 59
Discharge: HOME | End: 2019-09-03
Payer: COMMERCIAL

## 2019-09-03 ENCOUNTER — OFFICE VISIT (OUTPATIENT)
Dept: OBGYN CLINIC | Facility: HOSPITAL | Age: 59
End: 2019-09-03
Payer: COMMERCIAL

## 2019-09-03 VITALS
HEART RATE: 65 BPM | WEIGHT: 268 LBS | DIASTOLIC BLOOD PRESSURE: 84 MMHG | SYSTOLIC BLOOD PRESSURE: 128 MMHG | BODY MASS INDEX: 45.75 KG/M2 | HEIGHT: 64 IN

## 2019-09-03 DIAGNOSIS — M79.671 PAIN IN RIGHT FOOT: Primary | ICD-10-CM

## 2019-09-03 DIAGNOSIS — S92.354A CLOSED NONDISPLACED FRACTURE OF FIFTH METATARSAL BONE OF RIGHT FOOT, INITIAL ENCOUNTER: ICD-10-CM

## 2019-09-03 DIAGNOSIS — M79.671 PAIN IN RIGHT FOOT: ICD-10-CM

## 2019-09-03 PROCEDURE — 99203 OFFICE O/P NEW LOW 30 MIN: CPT | Performed by: PHYSICIAN ASSISTANT

## 2019-09-03 PROCEDURE — 73630 X-RAY EXAM OF FOOT: CPT

## 2019-09-03 NOTE — PATIENT INSTRUCTIONS
Ice Pack Application   WHAT YOU NEED TO KNOW:   Ice can be used to decrease swelling and pain after an injury or surgery  Common injuries that may benefit from ice therapy are sprains, strains, and bruises  The use of ice is most effective in the first 1 to 3 days after an injury  DISCHARGE INSTRUCTIONS:   How to apply ice:   · Fill a bag with crushed ice about half full  Remove the air from the bag before you close it  You can also use a bag of frozen vegetables  · Wrap the ice pack in a cloth to protect your skin from frostbite or other injury  · Put the ice over the injured area for 20 to 30 minutes or as long as directed  · Check your skin after about 30 seconds for color changes or blistering  Remove the ice if you notice skin changes or you feel burning or numbness in the area  · Throw the ice pack away after use  · Apply ice to your injured area 4 times each day or as directed  Ask your healthcare provider how many days you should apply ice  Contact your healthcare provider if:   · You see blisters, whitening of your skin, or a bluish color to your skin after using ice  · You feel burning or numbness when using ice  · You have questions about the use of ice packs  © 2017 [facility] Information is for End User's use only and may not be sold, redistributed or otherwise used for commercial purposes  All illustrations and images included in CareNotes® are the copyrighted property of [employer]  or [employer]  The above information is an  only  It is not intended as medical advice for individual conditions or treatments  Talk to your doctor, nurse or pharmacist before following any medical regimen to see if it is safe and effective for you  Safe Use of NSAIDs   WHAT YOU NEED TO KNOW:   NSAIDs are medicines that are used to decrease pain, swelling, and fever  NSAIDs are available with or without a doctor's order   NSAIDs that you can buy without a doctor's order include aspirin, ibuprofen, and naproxen  DISCHARGE INSTRUCTIONS:   Return to the emergency department if:   · You have swelling around your mouth or trouble breathing  · You are breathing fast or you have a fast heartbeat  · You have nausea, vomiting, or abdominal pain  · You have blood in your vomit or bowel movements  · You have a seizure  Contact your healthcare provider if:   · You have a headache or become confused  · You develop hearing loss or ringing in your ears  · You develop itching, a rash, or hives  · You have swelling around your lower legs, feet, ankles, and hands  · You do not know how much NSAIDs to give to your child  · You have questions or concerns about your condition or care  How to give NSAIDs to your child safely:   · Read the directions on the label  Find out if the medicine is right for your child's age and how much to give to your child  The dose for your child's weight or age should be listed  Do not  give your child more than the recommended amount  · Use the measuring tool that came with the medicine  Do not  use another measuring tool, such as a kitchen spoon  Other measuring tools do not provide the right amount of medicine  How to take NSAIDs safely:   · Read the directions on the label to learn how much medicine you should take and often to take it  Do not take more than the recommended amount  · Talk to your healthcare provider if you need take NSAIDs for more than 30 days  The longer you take NSAIDs, the higher your risk of side effects will be  You may need to take other medicines to decrease your risk of side effects such as stomach bleeding  · Do not take an over-the-counter NSAIDs with prescription NSAIDs  The combined amount of NSAIDs may be too high  · Tell your healthcare provider about other medicines you take  Some medicines can increase the risk of side effects from NSAIDs   Your healthcare provider will tell you if it is okay to take NSAIDs and how to take them  Who should not take NSAIDs:  Certain people should avoid or limit NSAIDs  Do not  give NSAIDs to children under 10months of age without direction from your child's doctor  Do not give aspirin to children under 25years of age  Your child could develop Reye syndrome if he takes aspirin  Reye syndrome can cause life-threatening brain and liver damage  Check your child's medicine labels for aspirin, salicylates, or oil of wintergreen  Talk to your healthcare provider before you take NSAIDs if any of the following apply to you:  · You have reflux disease, a peptic ulcer, H pylori infection, or bleeding in your stomach or intestines  · You have a bleeding disorder, or you take blood-thinning medicine  · You are allergic to aspirin or other NSAIDs  · You have liver or kidney or disease  · You have high blood pressure or heart disease  · You have 3 or more alcoholic drinks each day  · You are pregnant  What you need to know about an NSAID overdose:  Certain health problems can occur if you take too much NSAID medicine at one time or over time  Problems include nausea, vomiting, and abdominal pain  You may develop gastritis, peptic ulcers, and stomach bleeding  You may also develop fluid retention, heart problems, and kidney problems  NSAIDs can worsen high blood pressure  You may become confused, or you may have a headache, hearing loss, or hallucinations  An overdose of aspirin may also cause rapid breathing, a rapid heartbeat, or seizures  What to do if you think you or your child took too much NSAID medicine:  Call the [facility] at [phone] immediately  © 2017 [address] [employer]  Information is for End User's use only and may not be sold, redistributed or otherwise used for commercial purposes   All illustrations and images included in CareNotes® are the copyrighted property of [employer] Bruce  or [employer]  The above information is an  only  It is not intended as medical advice for individual conditions or treatments  Talk to your doctor, nurse or pharmacist before following any medical regimen to see if it is safe and effective for you

## 2019-09-03 NOTE — LETTER
September 3, 2019     Patient: Ramirez Matias   YOB: 1960   Date of Visit: 9/3/2019       To Whom it May Concern:    Carlie Kacey is under my professional care  She was seen in my office on 9/3/2019  She may return to work on 9/9/19  Allow to wear boot       If you have any questions or concerns, please don't hesitate to call           Sincerely,          Jimmie Bonds PA-C        CC: Ramirez Matias

## 2019-09-03 NOTE — PROGRESS NOTES
Assessment/Plan   Diagnoses and all orders for this visit:    Pain in right foot  -     XR foot 3+ vw right; Future    Closed nondisplaced fracture of fifth metatarsal bone of right foot, initial encounter    - Low CAM boot fitted and dispensed  Do not drive with the boot on   - Use the scooter this week  - Follow up with Dr Misti Gandhi or  sports medicine  - Ice as needed      Subjective   Patient ID: Clarissa Jack is a 61 y o  female  Vitals:    09/03/19 1200   BP: 128/84   Pulse: 72     58yo female comes in for an evaluation of her right foot  She was injured 8/29/19 when she missed a step and fell  She went to the ER and xrays showed a fracture of the base of the 5th metatarsal   The pain is dull in character, mild in severity, pain does not radiate and is not associated with numbness  She was unable to balance on crutches so she is using a scooter instead  She works as a long-term care nurse and requests a note to stay out of work this week and go back on Monday  This is not work comp  The following portions of the patient's history were reviewed and updated as appropriate: allergies, current medications, past family history, past medical history, past social history, past surgical history and problem list     Review of Systems  Ortho Exam  Past Medical History:   Diagnosis Date    GERD (gastroesophageal reflux disease)     No medications    History of cervical dysplasia     History cryosurgery    Hypertension     Mitral regurgitation     Obesity     Postmenopausal bleeding     Rash     On left upper thigh and under abdominal fold- is seen by dermatologist    Stress incontinence     Thrombophlebitis Early 1990's    Superficial, right calf, from birth control pill    Wears glasses      Past Surgical History:   Procedure Laterality Date    [place] HYSTEROSCOPY,W/ENDO BX N/A 7/24/2018    Procedure: DILATATION AND CURETTAGE (D&C) WITH HYSTEROSCOPY;  Surgeon:  Moni Perez DO;  Location: [facility];  Service: Gynecology    WISDOM TOOTH EXTRACTION       Family History   Problem Relation Age of Onset    Coronary artery disease Sister         started with CAD in her 42's     Social History     Occupational History    Not on file   Tobacco Use    Smoking status: Former Smoker     Packs/day: 0 75     Years: 20 00     Pack years: 15 00     Types: Cigarettes     Last attempt to quit:      Years since quittin 6    Smokeless tobacco: Never Used    Tobacco comment: 1 ppd x15 years    Substance and Sexual Activity    Alcohol use: No     Comment: being a social drinker noted in "allscripts"     Drug use: No    Sexual activity: Not on file       Review of Systems   Constitutional: Negative  HENT: Negative  Eyes: Negative  Respiratory: Negative  Cardiovascular: Negative  Gastrointestinal: Negative  Endocrine: Negative  Genitourinary: Negative  Musculoskeletal: As below      Allergic/Immunologic: Negative  Neurological: Negative  Hematological: Negative  Psychiatric/Behavioral: Negative  Objective   Physical Exam        I have personally reviewed pertinent films in PACS and my interpretation is nondisplaced fracture of the base of the 5th MT  This does not appear to be a true davies fracture         · Constitutional: Awake, Alert, Oriented  · Eyes: EOMI  · Psych: Mood and affect appropriate  · Heart: regular rate and rhythm  · Lungs: No audible wheezing  · Abdomen: soft  · Lymph: no lymphedema             right foot:  - Appearance   No swelling, discoloration, deformity, or ecchymosis  - Palpation   + 5th MT tenderness and Otherwise, no tenderness about the foot or ankle   - ROM   not examined due to fracture status  - Special Tests      - Motor   limited by pain  - NVI distally

## 2019-09-10 ENCOUNTER — OFFICE VISIT (OUTPATIENT)
Dept: OBGYN CLINIC | Facility: CLINIC | Age: 59
End: 2019-09-10
Payer: COMMERCIAL

## 2019-09-10 ENCOUNTER — TELEPHONE (OUTPATIENT)
Dept: OBGYN CLINIC | Facility: HOSPITAL | Age: 59
End: 2019-09-10

## 2019-09-10 VITALS
SYSTOLIC BLOOD PRESSURE: 141 MMHG | HEIGHT: 64 IN | DIASTOLIC BLOOD PRESSURE: 82 MMHG | BODY MASS INDEX: 46 KG/M2 | HEART RATE: 75 BPM

## 2019-09-10 DIAGNOSIS — S92.354A CLOSED NONDISPLACED FRACTURE OF FIFTH METATARSAL BONE OF RIGHT FOOT, INITIAL ENCOUNTER: Primary | ICD-10-CM

## 2019-09-10 PROCEDURE — 99213 OFFICE O/P EST LOW 20 MIN: CPT | Performed by: ORTHOPAEDIC SURGERY

## 2019-09-10 RX ORDER — ASPIRIN 325 MG
325 TABLET, DELAYED RELEASE (ENTERIC COATED) ORAL 2 TIMES DAILY
Qty: 90 TABLET | Refills: 1 | Status: SHIPPED | OUTPATIENT
Start: 2019-09-10 | End: 2021-02-22 | Stop reason: ALTCHOICE

## 2019-09-10 NOTE — PROGRESS NOTES
OLIMPIA Chacon  Attending, Orthopaedic Surgery  Foot and [facility] [address] [facility]        ORTHOPAEDIC FOOT AND ANKLE CLINIC VISIT     Assessment:     Encounter Diagnosis   Name Primary?  Closed nondisplaced fracture of fifth metatarsal bone of right foot, initial encounter Yes              Plan:   · The patient verbalized understanding of exam findings and treatment plan  We engaged in the shared decision-making process and treatment options were discussed at length with the patient  Surgical and conservative management discussed today along with risks and benefits  · She has a nondisplaced fracture of the 5th metatarsal, zone 2  She has been walking in a cam boot since the injury 2 weeks ago  · Typically, we recommend NWB in a cast for 6-8 weeks for this Zone 2 injury  She refuses this treatment and understands the risk that this may lead to a nonunion  · She may continue WBAT in a cam boot  · Given prescription for ASA Ecotrin 325 mg BID for DVT prophylaxis while in the boot  · Recommend taking tylenol for pain  Avoid NSAIDs which may interfere with bone healing  Return in about 5 weeks (around 10/15/2019)  History of Present Illness:   Chief Complaint:   Chief Complaint   Patient presents with   [address] [place] [address] is a 61 y o  female who is being seen for right 5th metatarsal fracture  Patient reports 2 weeks ago, she missed a step and inverted her foot and felt a "pop" in the 5th metatarsal   Pain is localized at base of 5th metatarsal with minimal radiating and described as sharp and severe  She was seen by Wilfrido Martinez PA-C who put her in a cam boot WBAT and instructed to follow up in our clinic for further management  She has been walking in the cam boot without significant pain  Patient denies numbness, tingling or radicular pain  Denies history of neuropathy    Patient does not smoke, does not have diabetes and does not take blood thinners  Patient denies family history of anesthesia complications and has not had any complications with anesthesia  Pain/symptom timing:  Worse during the day when active  Pain/symptom context:  Worse with activites and work  Pain/symptom modifying factors:  Rest makes better, activities make worse  Pain/symptom associated signs/symptoms: none    Prior treatment   · NSAIDsYes    · Injections No   · Bracing/Orthotics Yes - cam boot  · Physical Therapy No     Orthopedic Surgical History:   See below     Past Medical, Surgical and Social History:  Past Medical History:  has a past medical history of GERD (gastroesophageal reflux disease), History of cervical dysplasia, Hypertension, Mitral regurgitation, Obesity, Postmenopausal bleeding, Rash, Stress incontinence, Thrombophlebitis (Early 1990's), and Wears glasses  Problem List: does not have any pertinent problems on file  Past Surgical History:  has a past surgical history that includes [place] tooth extraction and pr hysteroscopy,w/endo bx (N/A, 7/24/2018)  Family History: family history includes Coronary artery disease in her sister  Social History:  reports that she quit smoking about 11 years ago  Her smoking use included cigarettes  She has a 15 00 pack-year smoking history  She has never used smokeless tobacco  She reports that she does not drink alcohol or use drugs  Current Medications: has a current medication list which includes the following prescription(s): amlodipine, vitamin d, ibuprofen, metoprolol succinate, naproxen, triamterene-hydrochlorothiazide, triamterene-hydrochlorothiazide, aspirin, and ergocalciferol  Allergies: is allergic to no active allergies       Review of Systems:  General- denies fever/chills  HEENT- denies hearing loss or sore throat  Eyes- denies eye pain or visual disturbances, denies red eyes  Respiratory- denies cough or SOB  Cardio- denies chest pain or palpitations  GI- denies abdominal pain  Endocrine- denies urinary frequency  Urinary- denies pain with urination  Musculoskeletal- Negative except noted above  Skin- denies rashes or wounds  Neurological- denies dizziness or headache  Psychiatric- denies anxiety or difficulty concentrating    Physical Exam:   /82 (BP Location: Left arm, Patient Position: Sitting, Cuff Size: Adult)   Pulse 75   Ht 5' 4" (1 626 m)   BMI 46 00 kg/m²   General/Constitutional: No apparent distress: well-nourished and well developed  Eyes: normal ocular motion  Lymphatic: No appreciable lymphadenopathy  Respiratory: Non-labored breathing  Vascular: No edema, swelling or tenderness, except as noted in detailed exam   Integumentary: No impressive skin lesions present, except as noted in detailed exam   Neuro: No ataxia or tremors noted  Psych: Normal mood and affect, oriented to person, place and time  Appropriate affect  Musculoskeletal: Normal, except as noted in detailed exam and in HPI  Examination    Right    Gait Ambulating in cam boot   Musculoskeletal Tender to palpation at base of 5th metatarsal at fracture site    Skin Normal       Nails Normal    Range of Motion  20 degrees dorsiflexion, 40 degrees plantarflexion  Subtalar motion: normal    Stability Stable    Muscle Strength 5/5 tibialis anterior  5/5 gastrocnemius-soleus  5/5 posterior tibialis  5/5 peroneal/eversion strength  5/5 EHL  5/5 FHL    Neurologic Normal    Sensation Intact to light touch throughout sural, saphenous, superficial peroneal, deep peroneal and medial/lateral plantar nerve distributions  [place]-Kera 5 07 filament (10g) testing deferred  Cardiovascular Brisk capillary refill < 2 seconds,intact DP and PT pulses    Special Tests None      Imaging Studies:   3 views of the right foot were taken, reviewed and interpreted independently that demonstrate a nondisplaced fracture of the base of the 5th metatarsal, zone 2  Reviewed by me personally      Scribe Attestation    I,:   Timo Galeas PA-C am acting as a scribe while in the presence of the attending physician :        I,:   Yumiko Rico MD personally performed the services described in this documentation    as scribed in my presence :              Imelda Lee Lachman, MD  Foot & Ankle Surgery   Department [address]      I personally performed the service  Imelda Lee Lachman, MD

## 2019-09-10 NOTE — LETTER
September 10, 2019     Patient: Lloyd Morin   YOB: 1960   Date of Visit: 9/10/2019       To Whom it May Concern:    Ileana Colin is under my professional care  She was seen in my office on 9/10/2019  She may return to work with limitations 9/11/2019  She should limit the amount of time on her feet and be allowed to take breaks as needed to elevate the foot at her desk  If you have any questions or concerns, please don't hesitate to call           Sincerely,          Janneth Monzon MD        CC: Lloyd Morin

## 2019-09-10 NOTE — TELEPHONE ENCOUNTER
Lars Calderón   #: [phone]         Patient called in requesting work note and after summary mailed to her home address on file   Please advise, thank you

## 2019-09-10 NOTE — PATIENT INSTRUCTIONS
Recommend taking the following supplements: Vitamin D 4000 units per day and Calcium 1200 mg per day  This will help with bone healing  Avoid taking NSAIDs (naproxen, ibuprofen) for this pain  Take tylenol

## 2019-09-11 ENCOUNTER — TELEPHONE (OUTPATIENT)
Dept: OBGYN CLINIC | Facility: HOSPITAL | Age: 59
End: 2019-09-11

## 2019-09-11 NOTE — TELEPHONE ENCOUNTER
Patient called in  # [phone]    Patient said that she was given a work note to return with restrictions, weight bearing and said that she doesn't think that she is able to return right now and would like a note to be put out of work until her next appointment  Patient would like a callback to discuss  Please advise

## 2019-09-16 NOTE — TELEPHONE ENCOUNTER
Called the patient and she states she is nonweightbearing in the CAM boot  She states she needs a new note to keep her out of work until her next visit  She will print the note out from [address]

## 2019-09-16 NOTE — TELEPHONE ENCOUNTER
Patient called back today  # [phone]    Patient would like to speak to the doctor in regards to a new work note and also would like to let him know what she has been doing regarding her foot  Please advise

## 2019-09-17 NOTE — ED ATTENDING ATTESTATION
Mana Lamar MD, saw and evaluated the patient  I have discussed the patient with the resident/non-physician practitioner and agree with the resident's/non-physician practitioner's findings, Plan of Care, and MDM as documented in the resident's/non-physician practitioner's note, except where noted  All available labs and Radiology studies were reviewed  I was present for key portions of any procedure(s) performed by the resident/non-physician practitioner and I was immediately available to provide assistance  At this point I agree with the current assessment done in the Emergency Department  I have conducted an independent evaluation of this patient a history and physical is as follows:    62 yo female p/w restrosternal non-radiating cp that is intermittent non-exertional x "many months"  Denies prior cardiac work up, only came to ED today 2/2 misstep and subsequent right ankle pain  Physical Exam   Constitutional: She is oriented to person, place, and time  She appears well-developed and well-nourished  HENT:   Head: Normocephalic and atraumatic  Eyes: Conjunctivae are normal    Neck: Normal range of motion  Cardiovascular: Normal rate, regular rhythm, normal heart sounds and intact distal pulses  No murmur heard  Pulmonary/Chest: Effort normal and breath sounds normal  No respiratory distress  Abdominal: Soft  Musculoskeletal: Normal range of motion  She exhibits no deformity  TTP base 5th MT right foot, no bruising or swelling, benign ankle/knee/hip, soft compartmetns, intact distal pulses   Neurological: She is alert and oriented to person, place, and time  Skin: Skin is warm and dry  She is not diaphoretic  Psychiatric: She has a normal mood and affect  Her behavior is normal  Judgment and thought content normal    Nursing note and vitals reviewed          Critical Care Time  Procedures

## 2019-09-17 NOTE — TELEPHONE ENCOUNTER
Patient called stating she needs a letter stating that she can return to work remotely starting 9/18  Please fax to [phone] joelle Castaneda

## 2019-10-22 ENCOUNTER — APPOINTMENT (OUTPATIENT)
Dept: RADIOLOGY | Facility: AMBULARY SURGERY CENTER | Age: 59
End: 2019-10-22
Attending: ORTHOPAEDIC SURGERY
Payer: COMMERCIAL

## 2019-10-22 ENCOUNTER — OFFICE VISIT (OUTPATIENT)
Dept: OBGYN CLINIC | Facility: CLINIC | Age: 59
End: 2019-10-22
Payer: COMMERCIAL

## 2019-10-22 VITALS — HEIGHT: 64 IN | BODY MASS INDEX: 46 KG/M2

## 2019-10-22 DIAGNOSIS — S92.354A CLOSED NONDISPLACED FRACTURE OF FIFTH METATARSAL BONE OF RIGHT FOOT, INITIAL ENCOUNTER: ICD-10-CM

## 2019-10-22 DIAGNOSIS — S92.354A CLOSED NONDISPLACED FRACTURE OF FIFTH METATARSAL BONE OF RIGHT FOOT, INITIAL ENCOUNTER: Primary | ICD-10-CM

## 2019-10-22 PROCEDURE — 73630 X-RAY EXAM OF FOOT: CPT

## 2019-10-22 PROCEDURE — 99213 OFFICE O/P EST LOW 20 MIN: CPT | Performed by: ORTHOPAEDIC SURGERY

## 2019-10-22 NOTE — PROGRESS NOTES
OLIMPIA Booker  Attending, Orthopaedic Surgery  [facility] [address] [facility]      ORTHOPAEDIC FOOT AND ANKLE CLINIC VISIT     Assessment:     Encounter Diagnosis   Name Primary?  Closed nondisplaced fracture of fifth metatarsal bone of right foot, initial encounter Yes            Plan:   · The patient verbalized understanding of exam findings and treatment plan  We engaged in the shared decision-making process and treatment options were discussed at length with the patient  Surgical and conservative management discussed today along with risks and benefits  · Madera Liming can wean out of cam walker boot to a stiff soled shoe  · Continue with Vitamin D and calcium  · She can gradually increase activity to tolerance to pre injury level  · Rest, ice and elevation advised as needed  · She can take Tylenol for pain control as needed  Return if symptoms worsen or fail to improve  History of Present Illness:   Chief Complaint:   Chief Complaint   Patient presents with   [address] is a 61 y o  female who is being seen in follow-up for Right Zone II  5th metatarsal fracture  When we last saw she we recommended weight bearing as tolerated in cam walker boot,  mg BID for DVT prophylaxis, Tylenol for pain control  Pain has significantly improved while in the cam walker boot, she states she put weight on her foot yesterday barefooted and minimal soreness was present  Residual pain is localized at 5th metatarsal base with minimal radiating and described as sharp and severe        Pain/symptom timing:  Worse during the day when active  Pain/symptom context:  Worse with activites and work  Pain/symptom modifying factors:  Rest makes better, activities make worse  Pain/symptom associated signs/symptoms: none    Prior treatment   · NSAIDsNo   · Injections No   · Bracing/Orthotics Yes    · Physical Therapy No     Orthopedic Surgical History:   See below  Past Medical, Surgical and Social History:  Past Medical History:  has a past medical history of GERD (gastroesophageal reflux disease), History of cervical dysplasia, Hypertension, Mitral regurgitation, Obesity, Postmenopausal bleeding, Rash, Stress incontinence, Thrombophlebitis (Early 1990's), and Wears glasses  Problem List: does not have any pertinent problems on file  Past Surgical History:  has a past surgical history that includes [place] tooth extraction and pr hysteroscopy,w/endo bx (N/A, 7/24/2018)  Family History: family history includes Coronary artery disease in her sister  Social History:  reports that she quit smoking about 11 years ago  Her smoking use included cigarettes  She has a 15 00 pack-year smoking history  She has never used smokeless tobacco  She reports that she does not drink alcohol or use drugs  Current Medications: has a current medication list which includes the following prescription(s): amlodipine, aspirin, vitamin d, ibuprofen, metoprolol succinate, naproxen, triamterene-hydrochlorothiazide, triamterene-hydrochlorothiazide, and ergocalciferol  Allergies: is allergic to no active allergies  Review of Systems:  General- denies fever/chills  HEENT- denies hearing loss or sore throat  Eyes- denies eye pain or visual disturbances, denies red eyes  Respiratory- denies cough or SOB  Cardio- denies chest pain or palpitations  GI- denies abdominal pain  Endocrine- denies urinary frequency  Urinary- denies pain with urination  Musculoskeletal- Negative except noted above  Skin- denies rashes or wounds  Neurological- denies dizziness or headache  Psychiatric- denies anxiety or difficulty concentrating    Physical Exam:   Ht 5' 4" (1 626 m)   BMI 46 00 kg/m²   General/Constitutional: No apparent distress: well-nourished and well developed    Eyes: normal ocular motion  Lymphatic: No appreciable lymphadenopathy  Respiratory: Non-labored breathing  Vascular: No edema, swelling or tenderness, except as noted in detailed exam   Integumentary: No impressive skin lesions present, except as noted in detailed exam   Neuro: No ataxia or tremors noted  Psych: Normal mood and affect, oriented to person, place and time  Appropriate affect  Musculoskeletal: Normal, except as noted in detailed exam and in HPI  Examination    Right    Gait Not tested, she presents in cam walker boot  Musculoskeletal  No ttp at the 5th metatarsal base  Skin  Mild swelling 5th metatarsal     Nails Normal    Range of Motion  15 degrees dorsiflexion, 40 degrees plantarflexion  Subtalar motion: wnl    Stability Stable    Muscle Strength 5/5 tibialis anterior  5/5 gastrocnemius-soleus  5/5 posterior tibialis  5/5 peroneal/eversion strength  5/5 EHL  5/5 FHL    Neurologic Normal    Sensation Intact to light touch throughout sural, saphenous, superficial peroneal, deep peroneal and medial/lateral plantar nerve distributions  [place]-Kera 5 07 filament (10g) testing deferred  Cardiovascular Brisk capillary refill < 2 seconds,intact DP and PT pulses    Special Tests None      Imaging Studies:   Xray 3 views right foot:  Demonstrates ongoing healing with consolidation across the fracture site, fracture line remains visible  No displacement noted  Reviewed by me personally      Scribe Attestation    I,:   Arline Crane am acting as a scribe while in the presence of the attending physician :        I,:   Ben Murray MD personally performed the services described in this documentation    as scribed in my presence :              Roena Baar Lachman, MD  Foot & Ankle Surgery   Department of [address] [place] [address]      I personally performed the service  Roena Baar Lachman, MD

## 2019-10-22 NOTE — PATIENT INSTRUCTIONS
You may now begin weaning your boot and transitioning to a sneaker  It is important to do this gradually to avoid aggravating the healing process  1  Today, you may come out of the boot into a sneaker for 2 hours  2  Tomorrow, you may come out of the boot into a sneaker for 4 hours,  3  The next day, you may come out of the boot into a sneaker for 6 hours  4  Continue this (adding 2 hours per day) as you tolerate  For example, if you do 6 hours out of the boot into a sneaker and your foot swells more than usual at night and it is difficult to control the discomfort, do not advance to 8 hours the next day, stay at 6 hours until you are able to tolerate it  Elevation, Ice and tylenol and staying off of it at night will be important to aide in this transition out of the boot  Swelling and soreness are normal as you begin to do more with the injured leg

## 2019-10-22 NOTE — LETTER
October 22, 2019     Patient: Marie Jaime   YOB: 1960   Date of Visit: 10/22/2019       To Whom it May Concern:    Brent Haynes is under my professional care  She was seen in my office on 10/22/2019  She may return to work on 10/22/19 unrestricted full duty       If you have any questions or concerns, please don't hesitate to call           Sincerely,          Mannie Rea MD        CC: Marie Jaime

## 2019-11-05 ENCOUNTER — OFFICE VISIT (OUTPATIENT)
Dept: FAMILY MEDICINE CLINIC | Facility: CLINIC | Age: 59
End: 2019-11-05
Payer: COMMERCIAL

## 2019-11-05 VITALS
OXYGEN SATURATION: 97 % | HEART RATE: 84 BPM | BODY MASS INDEX: 46.17 KG/M2 | DIASTOLIC BLOOD PRESSURE: 86 MMHG | SYSTOLIC BLOOD PRESSURE: 144 MMHG | WEIGHT: 269 LBS | TEMPERATURE: 97.3 F | RESPIRATION RATE: 16 BRPM

## 2019-11-05 DIAGNOSIS — Z12.11 COLON CANCER SCREENING: ICD-10-CM

## 2019-11-05 DIAGNOSIS — Z00.00 ROUTINE ADULT HEALTH MAINTENANCE: Primary | ICD-10-CM

## 2019-11-05 DIAGNOSIS — Z13.220 SCREENING FOR HYPERLIPIDEMIA: ICD-10-CM

## 2019-11-05 DIAGNOSIS — Z12.39 BREAST CANCER SCREENING: ICD-10-CM

## 2019-11-05 DIAGNOSIS — E55.9 VITAMIN D DEFICIENCY: ICD-10-CM

## 2019-11-05 DIAGNOSIS — Z11.59 ENCOUNTER FOR HEPATITIS C SCREENING TEST FOR LOW RISK PATIENT: ICD-10-CM

## 2019-11-05 DIAGNOSIS — I10 ESSENTIAL HYPERTENSION: ICD-10-CM

## 2019-11-05 DIAGNOSIS — R73.03 PRE-DIABETES: ICD-10-CM

## 2019-11-05 DIAGNOSIS — G47.33 OBSTRUCTIVE SLEEP APNEA: ICD-10-CM

## 2019-11-05 PROBLEM — Z98.890 STATUS POST HYSTEROSCOPY: Status: RESOLVED | Noted: 2018-07-24 | Resolved: 2019-11-05

## 2019-11-05 PROCEDURE — 99396 PREV VISIT EST AGE 40-64: CPT | Performed by: FAMILY MEDICINE

## 2019-11-05 NOTE — PROGRESS NOTES
[address] 61 y o  female   DATE: November 5, 2019     Assessment and Plan:  61 y o  female exam      1  Health Maintenance  - Colonoscopy? Never had one, average risk, agreeable to Cologuard  - Pap? 5/31/2018-[id number]  - Mammo? Overdue, recommended today  - Labs: BMP, A1c, FLP  - Immunizations: Reviewed  Recommend yearly flu vaccine-UTD  Will place on wait list for Shingrix  Will check on coverage for TdaP  - Form completed and returned to patient    2  Discussed the patient's BMI with her  BMI Counseling: Body mass index is 46 17 kg/m²  The BMI is above normal  Nutrition recommendations include moderation in carbohydrate intake and reducing intake of cholesterol  3  Other diagnoses addressed today:   Essential hypertension  BP Readings from Last 3 Encounters:   09/10/19 141/82   09/03/19 128/84   08/29/19 117/56     Lab Results   Component Value Date    CREATININE 1 16 08/29/2019    EGFR 52 08/29/2019     Controlled on current regimen:  Amlodipine 2 5mg, Metoprolol succinate 90mg, Triamterene/HCTZ 37 5/25mg    Pre-diabetes  Lab Results   Component Value Date    HGBA1C 6 1 10/19/2018     Reviewed low carb diet, recheck A1c and BMP    Advised she can d/c ASA 325mg, no medical indication    Follow up next physical in 1 year      Subjective:    Diann Peralta is a 61 y o  female and is here for a comprehensive physical exam    Acute Complaints: Needs a form filled out for [facility]   Works at [employer] has HTN, normally BP is well controlled with 4 meds  Was on Vit D 4000 IU after recent stress fracture, has a history of deficiency  Inactive, unhealthy diet    Histories Updated and Reviewed 11/5/2019:  Patient's Medications   New Prescriptions    No medications on file   Previous Medications    AMLODIPINE (NORVASC) 2 5 MG TABLET    TAKE 1 TABLET (2 5 MG TOTAL) BY MOUTH EVERY MORNING    ASPIRIN (ECOTRIN) 325 MG EC TABLET    Take 1 tablet (325 mg total) by mouth 2 (two) times a day    CHOLECALCIFEROL (VITAMIN D) 2000 UNITS CAPS    Take 1 tablet by mouth daily    ERGOCALCIFEROL (VITAMIN D2) 50,000 UNITS    Take 1 capsule (50,000 Units total) by mouth once a week for 8 doses    IBUPROFEN (MOTRIN) 200 MG TABLET    Take 400 mg by mouth every 6 (six) hours as needed for mild pain    METOPROLOL SUCCINATE (TOPROL-XL) 100 MG 24 HR TABLET    TAKE 1 TABLET BY MOUTH EVERY DAY IN THE MORNING    TRIAMTERENE-HYDROCHLOROTHIAZIDE (DYAZIDE) 37 5-25 MG PER CAPSULE    Take 1 capsule by mouth every morning   Modified Medications    No medications on file   Discontinued Medications    NAPROXEN (NAPROSYN) 500 MG TABLET    Take 1 tablet (500 mg total) by mouth 2 (two) times a day with meals    TRIAMTERENE-HYDROCHLOROTHIAZIDE (MAXZIDE-25) 37 5-25 MG PER TABLET    TAKE 1 TABLET BY MOUTH EVERY DAY     Allergies   Allergen Reactions    No Active Allergies      Past Medical History:   Diagnosis Date    GERD (gastroesophageal reflux disease)     No medications    History of cervical dysplasia     History cryosurgery    Hypertension     Mitral regurgitation     Obesity     Postmenopausal bleeding     Rash     On left upper thigh and under abdominal fold- is seen by dermatologist    Stress incontinence     Thrombophlebitis Early 1990's    Superficial, right calf, from birth control pill    Wears glasses      Social History     Socioeconomic History    Marital status: /Civil Union     Spouse name: Not on file    Number of children: Not on file    Years of education: Not on file    Highest education level: Not on file   Occupational History    Not on file   Social Needs    Financial resource strain: Not on file    Food insecurity:     Worry: Not on file     Inability: Not on file    Transportation needs:     Medical: Not on file     Non-medical: Not on file   Tobacco Use    Smoking status: Former Smoker     Packs/day: 0 75     Years: 20 00     Pack years: 15 00     Types: Cigarettes     Last attempt to quit: 2008     Years since quittin 8    Smokeless tobacco: Never Used    Tobacco comment: 1 ppd x15 years    Substance and Sexual Activity    Alcohol use: No     Comment: being a social drinker noted in "allscripts"     Drug use: No    Sexual activity: Not on file   Lifestyle    Physical activity:     Days per week: Not on file     Minutes per session: Not on file    Stress: Not on file   Relationships    Social connections:     Talks on phone: Not on file     Gets together: Not on file     Attends Voodoo service: Not on file     Active member of club or organization: Not on file     Attends meetings of clubs or organizations: Not on file     Relationship status: Not on file    Intimate partner violence:     Fear of current or ex partner: Not on file     Emotionally abused: Not on file     Physically abused: Not on file     Forced sexual activity: Not on file   Other Topics Concern    Not on file   Social History Narrative    Not on file     Immunization History   Administered Date(s) Administered    INFLUENZA 10/09/2015, 10/31/2019    Influenza TIV (IM) 2014    Tuberculin Skin Test-PPD Intradermal 10/09/2015     Review of Systems:  Review of Systems   Constitutional: Negative for chills and fever  HENT: Negative for ear pain  Eyes: Negative for visual disturbance  Respiratory: Negative for cough and shortness of breath  Cardiovascular: Positive for leg swelling  Negative for chest pain and palpitations  Gastrointestinal: Negative for abdominal pain, diarrhea, nausea and vomiting  Musculoskeletal: Negative for arthralgias  Skin: Negative for rash  Neurological: Negative for headaches  Hematological: Does not bruise/bleed easily       PHQ-9 Depression Screening    PHQ-9:    Frequency of the following problems over the past two weeks:       Little interest or pleasure in doing things:  0 - not at all  Feeling down, depressed, or hopeless:  0 - not at all  PHQ-2 Score:  0       Objective:  /86   Pulse 84   Temp (!) 97 3 °F (36 3 °C)   Resp 16   Wt 122 kg (269 lb)   SpO2 97%   BMI 46 17 kg/m²   Physical Exam   Constitutional: She is oriented to person, place, and time  She appears well-developed and well-nourished  No distress  obese   HENT:   Head: Normocephalic and atraumatic  Mouth/Throat: Oropharynx is clear and moist  No oropharyngeal exudate  Eyes: Pupils are equal, round, and reactive to light  EOM are normal  Right eye exhibits no discharge  Left eye exhibits no discharge  Neck: Normal range of motion  Neck supple  No JVD present  Cardiovascular: Normal rate, regular rhythm and normal heart sounds  No murmur heard  Pulmonary/Chest: Effort normal and breath sounds normal  No stridor  No respiratory distress  She has no wheezes  Abdominal: Soft  Bowel sounds are normal  There is no tenderness  There is no rebound and no guarding  Musculoskeletal: Normal range of motion  She exhibits edema (RLE 1-2+ pitting edema)  She exhibits no tenderness  Neurological: She is alert and oriented to person, place, and time  Skin: Skin is warm and dry  She is not diaphoretic  No erythema  Psychiatric: She has a normal mood and affect  Her behavior is normal    Vitals reviewed  Patient Care Team:  Annelise Burnham MD as PCP - General (Family Medicine)    Annelise Burnham MD    Note: Portions of the record may have been created with voice recognition software  Occasional wrong word or "sound a like" substitutions may have occurred due to the inherent limitations of voice recognition software  Read the chart carefully and recognize, using context, where substitutions have occurred

## 2019-11-05 NOTE — ASSESSMENT & PLAN NOTE
Diagnosed 5 years ago but told it was mild, has not lost weight, still sleeps with 2-3 pillows  Would consider repeat DOTTIE if BP remains persistently elevated

## 2019-11-05 NOTE — ASSESSMENT & PLAN NOTE
Lab Results   Component Value Date    HGBA1C 6 1 10/19/2018     Reviewed low carb diet, recheck A1c and BMP

## 2019-11-05 NOTE — ASSESSMENT & PLAN NOTE
BP Readings from Last 3 Encounters:   11/05/19 144/86   09/10/19 141/82   09/03/19 128/84     Lab Results   Component Value Date    CREATININE 1 16 08/29/2019    EGFR 52 08/29/2019     Uncontrolled on current regimen:  Amlodipine 2 5mg, Metoprolol succinate 90mg, Triamterene/HCTZ 37 5/25mg  Pt not willing to adjust meds, will have her return in 2 weeks for nurse visit BP check, if persistently above 140/90, will increase Amlodipine    Recommended weight loss and DASH diet  If needs adjusting, would repeat sleep study

## 2019-11-10 DIAGNOSIS — I10 ESSENTIAL HYPERTENSION: ICD-10-CM

## 2019-11-10 RX ORDER — TRIAMTERENE AND HYDROCHLOROTHIAZIDE 37.5; 25 MG/1; MG/1
TABLET ORAL
Qty: 90 TABLET | Refills: 3 | Status: SHIPPED | OUTPATIENT
Start: 2019-11-10 | End: 2021-02-22 | Stop reason: SDUPTHER

## 2019-11-16 DIAGNOSIS — I10 ESSENTIAL HYPERTENSION: ICD-10-CM

## 2019-11-16 RX ORDER — METOPROLOL SUCCINATE 100 MG/1
TABLET, EXTENDED RELEASE ORAL
Qty: 90 TABLET | Refills: 3 | Status: SHIPPED | OUTPATIENT
Start: 2019-11-16 | End: 2020-12-22 | Stop reason: SDUPTHER

## 2019-11-25 ENCOUNTER — CLINICAL SUPPORT (OUTPATIENT)
Dept: FAMILY MEDICINE CLINIC | Facility: CLINIC | Age: 59
End: 2019-11-25
Payer: COMMERCIAL

## 2019-11-25 VITALS — SYSTOLIC BLOOD PRESSURE: 130 MMHG | DIASTOLIC BLOOD PRESSURE: 84 MMHG | TEMPERATURE: 97.4 F

## 2019-11-25 DIAGNOSIS — I10 HYPERTENSION, UNSPECIFIED TYPE: Primary | ICD-10-CM

## 2019-11-25 PROCEDURE — 99211 OFF/OP EST MAY X REQ PHY/QHP: CPT

## 2019-11-25 NOTE — PROGRESS NOTES
Patient is here for a blood pressure check  After speaking with Dr Eunice Albert, patient was instructed to keep medications the same as her blood pressure was not as high as it was at previous visit

## 2020-01-08 ENCOUNTER — TRANSCRIBE ORDERS (OUTPATIENT)
Dept: LAB | Facility: CLINIC | Age: 60
End: 2020-01-08

## 2020-01-13 ENCOUNTER — HOSPITAL ENCOUNTER (OUTPATIENT)
Dept: RADIOLOGY | Age: 60
Discharge: HOME | End: 2020-01-13
Payer: COMMERCIAL

## 2020-01-13 VITALS — WEIGHT: 270 LBS | HEIGHT: 64 IN | BODY MASS INDEX: 46.1 KG/M2

## 2020-01-13 DIAGNOSIS — Z12.39 BREAST CANCER SCREENING: ICD-10-CM

## 2020-01-13 PROCEDURE — 77067 SCR MAMMO BI INCL CAD: CPT

## 2020-02-21 DIAGNOSIS — I10 ESSENTIAL HYPERTENSION: ICD-10-CM

## 2020-02-21 RX ORDER — AMLODIPINE BESYLATE 2.5 MG/1
2.5 TABLET ORAL EVERY MORNING
Qty: 90 TABLET | Refills: 1 | Status: SHIPPED | OUTPATIENT
Start: 2020-02-21 | End: 2020-08-25

## 2020-03-30 ENCOUNTER — TELEPHONE (OUTPATIENT)
Dept: FAMILY MEDICINE CLINIC | Facility: CLINIC | Age: 60
End: 2020-03-30

## 2020-03-30 NOTE — TELEPHONE ENCOUNTER
Patient called stating She had Direct patient care with a patient that tested positive for Covid at [employer]  Patient would like to know what steps she needs to take  She does not have symptoms  Her temp was a little higher then usual 99 2  She is asking what she should do with her family at home and being around coworkers  Please advise  I did let patient know what to look for to be tested and self quarantine  Please advise   Patient would like a return call [phone]

## 2020-03-31 LAB — EXT SARS-COV-2: NOT DETECTED

## 2020-08-21 ENCOUNTER — OFFICE VISIT (OUTPATIENT)
Dept: OBGYN CLINIC | Facility: CLINIC | Age: 60
End: 2020-08-21
Payer: COMMERCIAL

## 2020-08-21 ENCOUNTER — APPOINTMENT (OUTPATIENT)
Dept: RADIOLOGY | Facility: AMBULARY SURGERY CENTER | Age: 60
End: 2020-08-21
Attending: ORTHOPAEDIC SURGERY
Payer: COMMERCIAL

## 2020-08-21 VITALS
DIASTOLIC BLOOD PRESSURE: 84 MMHG | HEART RATE: 76 BPM | SYSTOLIC BLOOD PRESSURE: 139 MMHG | BODY MASS INDEX: 46.35 KG/M2 | HEIGHT: 64 IN

## 2020-08-21 DIAGNOSIS — M84.374A STRESS REACTION OF RIGHT FOOT, INITIAL ENCOUNTER: Primary | ICD-10-CM

## 2020-08-21 DIAGNOSIS — M79.671 PAIN IN RIGHT FOOT: ICD-10-CM

## 2020-08-21 PROCEDURE — 73630 X-RAY EXAM OF FOOT: CPT

## 2020-08-21 PROCEDURE — 1036F TOBACCO NON-USER: CPT | Performed by: ORTHOPAEDIC SURGERY

## 2020-08-21 PROCEDURE — 99213 OFFICE O/P EST LOW 20 MIN: CPT | Performed by: ORTHOPAEDIC SURGERY

## 2020-08-21 PROCEDURE — 3079F DIAST BP 80-89 MM HG: CPT | Performed by: ORTHOPAEDIC SURGERY

## 2020-08-21 PROCEDURE — 3075F SYST BP GE 130 - 139MM HG: CPT | Performed by: ORTHOPAEDIC SURGERY

## 2020-08-21 RX ORDER — ASPIRIN 81 MG/1
81 TABLET ORAL 2 TIMES DAILY
Qty: 84 TABLET | Refills: 0 | Status: SHIPPED | OUTPATIENT
Start: 2020-08-21

## 2020-08-21 NOTE — PROGRESS NOTES
OLIMPIA Mcrae  Attending, Orthopaedic Surgery  [facility] [address] [facility]      ORTHOPAEDIC FOOT AND ANKLE CLINIC VISIT     Assessment:     Encounter Diagnoses   Name Primary?  Pain in right foot     Stress reaction of right foot, initial encounter Yes            Plan:   · The patient verbalized understanding of exam findings and treatment plan  We engaged in the shared decision-making process and treatment options were discussed at length with the patient  Surgical and conservative management discussed today along with risks and benefits  · Patient has stress reaction of the right fifth metatarsal base  Her previous fracture has healed  · Weight bear as tolerated right lower extremity in CAM boot  · Discussed with patient to start wearing Cam boot right foot when walking  · Advised patient to avoid any jumping on the right foot when doing aqua aerobics  · Cortical vitamin D and calcium was initiated today  · Prescribed patient Baby ASA 81 mg twice a day for 6 weeks   · May take Tylenol p r n  or pain advised patient to stay away from NSAIDs  · Will consider weaning patient had a Cam boot at next office visit  Return in about 5 weeks (around 9/25/2020) for Recheck right foot   History of Present Illness:   Chief Complaint:   Chief Complaint   Patient presents with   Di 149 is a 61 y o  female who is being seen in follow-up for Right 5th metatarsal pain   When we last saw she we recommended to weak self at a Cam and to a hard sole shoe  Pain has  Improved  She states she started doing aqua aerobics couple months ago and states about two weeks ago she started having increased pain over the lateral med for region of the right foot  Residual pain is localized at the 5th metatarsal/midfoot region with minimal radiating and described as sharp and severe    She notes pain is worse when she is doing aqua aerobics and jumping on the right foot   She denies having pain when walking  Pain/symptom timing:  Worse during the day when active  Pain/symptom context:  Worse with activites and work  Pain/symptom modifying factors:  Rest makes better, activities make worse  Pain/symptom associated signs/symptoms: none    Prior treatment   · NSAIDsNo   · Injections No   · Bracing/Orthotics Yes    · Physical Therapy No     Orthopedic Surgical History:   See below     Past Medical, Surgical and Social History:  Past Medical History:  has a past medical history of GERD (gastroesophageal reflux disease), History of cervical dysplasia, Hypertension, Mitral regurgitation, Obesity, Postmenopausal bleeding, Rash, Stress incontinence, Thrombophlebitis (Early 1990's), and Wears glasses  Problem List: does not have any pertinent problems on file  Past Surgical History:  has a past surgical history that includes [place] tooth extraction and pr hysteroscopy,w/endo bx (N/A, 7/24/2018)  Family History: family history includes Coronary artery disease in her sister; No Known Problems in her daughter, daughter, father, maternal grandfather, maternal grandmother, mother, paternal aunt, paternal aunt, paternal grandfather, and paternal grandmother  Social History:  reports that she quit smoking about 12 years ago  Her smoking use included cigarettes  She has a 15 00 pack-year smoking history  She has never used smokeless tobacco  She reports that she does not drink alcohol or use drugs  Current Medications: has a current medication list which includes the following prescription(s): amlodipine, aspirin, vitamin d, ibuprofen, metoprolol succinate, triamterene-hydrochlorothiazide, and ergocalciferol  Allergies: is allergic to no active allergies       Review of Systems:  General- denies fever/chills  HEENT- denies hearing loss or sore throat  Eyes- denies eye pain or visual disturbances, denies red eyes  Respiratory- denies cough or SOB  Cardio- denies chest pain or palpitations  GI- denies abdominal pain  Endocrine- denies urinary frequency  Urinary- denies pain with urination  Musculoskeletal- Negative except noted above  Skin- denies rashes or wounds  Neurological- denies dizziness or headache  Psychiatric- denies anxiety or difficulty concentrating    Physical Exam:   /84 (BP Location: Left arm, Patient Position: Sitting, Cuff Size: Adult)   Pulse 76   Ht 5' 4" (1 626 m)   BMI 46 35 kg/m²   General/Constitutional: No apparent distress: well-nourished and well developed  Eyes: normal ocular motion  Lymphatic: No appreciable lymphadenopathy  Respiratory: Non-labored breathing  Vascular: No edema, swelling or tenderness, except as noted in detailed exam   Integumentary: No impressive skin lesions present, except as noted in detailed exam   Neuro: No ataxia or tremors noted  Psych: Normal mood and affect, oriented to person, place and time  Appropriate affect  Musculoskeletal: Normal, except as noted in detailed exam and in HPI  Examination    Right    Gait Normal   Musculoskeletal Tender to palpation at 5th metatarsal and midfoot region     Skin Normal       Nails Normal    Range of Motion  Full  degrees dorsiflexion, full  degrees plantarflexion  Subtalar motion: with in normal limites     Stability Stable    Muscle Strength 5/5 tibialis anterior  5/5 gastrocnemius-soleus  5/5 posterior tibialis  5/5 peroneal/eversion strength  5/5 EHL  5/5 FHL    Neurologic Normal    Sensation  Intact to light touch throughout sural, saphenous, superficial peroneal, deep peroneal and medial/lateral plantar nerve distributions  [place]era 5 07 filament (10g) testing  deferred      Cardiovascular Brisk capillary refill < 2 seconds,intact DP and PT pulses    Special Tests None      Imaging Studies:    3 views of the Right foot  were obtained, reviewed and interpreted independently which demonstrate  Well healed 5th metatarsal base fracture  Reviewed by me personally  Shelvy Auer Lachman, MD  Foot & Ankle Surgery   Department [place] [address]      I personally performed the service  Shelvy Auer Lachman, MD      Scribe Attestation    I,:   Maninder Lunsford am acting as a scribe while in the presence of the attending physician :        I,:   Annel Pope MD personally performed the services described in this documentation    as scribed in my presence :

## 2020-08-21 NOTE — PATIENT INSTRUCTIONS
Recommend taking the following supplements: Vitamin D 4000 units per day and Calcium 1200 mg per day  This will help with bone healing       ASA 81 mg twice a day for DVT prophylaxis

## 2020-08-25 DIAGNOSIS — I10 ESSENTIAL HYPERTENSION: ICD-10-CM

## 2020-08-25 RX ORDER — AMLODIPINE BESYLATE 2.5 MG/1
TABLET ORAL
Qty: 90 TABLET | Refills: 0 | Status: SHIPPED | OUTPATIENT
Start: 2020-08-25 | End: 2021-02-04 | Stop reason: SDUPTHER

## 2020-09-25 ENCOUNTER — OFFICE VISIT (OUTPATIENT)
Dept: OBGYN CLINIC | Facility: CLINIC | Age: 60
End: 2020-09-25
Payer: COMMERCIAL

## 2020-09-25 VITALS — HEART RATE: 75 BPM | SYSTOLIC BLOOD PRESSURE: 137 MMHG | DIASTOLIC BLOOD PRESSURE: 84 MMHG

## 2020-09-25 DIAGNOSIS — M84.374A STRESS REACTION OF RIGHT FOOT, INITIAL ENCOUNTER: Primary | ICD-10-CM

## 2020-09-25 DIAGNOSIS — M79.671 PAIN IN RIGHT FOOT: ICD-10-CM

## 2020-09-25 DIAGNOSIS — S92.354A CLOSED NONDISPLACED FRACTURE OF FIFTH METATARSAL BONE OF RIGHT FOOT, INITIAL ENCOUNTER: ICD-10-CM

## 2020-09-25 DIAGNOSIS — M84.374A STRESS REACTION OF RIGHT FOOT, INITIAL ENCOUNTER: ICD-10-CM

## 2020-09-25 PROCEDURE — 1036F TOBACCO NON-USER: CPT | Performed by: ORTHOPAEDIC SURGERY

## 2020-09-25 PROCEDURE — 3079F DIAST BP 80-89 MM HG: CPT | Performed by: ORTHOPAEDIC SURGERY

## 2020-09-25 PROCEDURE — 99213 OFFICE O/P EST LOW 20 MIN: CPT | Performed by: ORTHOPAEDIC SURGERY

## 2020-09-25 RX ORDER — ASPIRIN 81 MG/1
TABLET ORAL
Qty: 84 TABLET | Refills: 0 | OUTPATIENT
Start: 2020-09-25

## 2020-09-25 NOTE — PROGRESS NOTES
OLIMPIA Sheldon  Attending, Orthopaedic Surgery  [facility] [address] [facility]      ORTHOPAEDIC FOOT AND ANKLE CLINIC VISIT     Assessment:     Encounter Diagnoses   Name Primary?  Stress reaction of right foot, initial encounter Yes    Closed nondisplaced fracture of fifth metatarsal bone of right foot, initial encounter             Plan:   · The patient verbalized understanding of exam findings and treatment plan  We engaged in the shared decision-making process and treatment options were discussed at length with the patient  Surgical and conservative management discussed today along with risks and benefits  · She has no pain over her fracture today  She has already weaned herself out of the boot and into a supportive sneaker about 1 week ago  · Wean boot instructions given  · No sandal, slipper, or barefoot walking  Supportive shoe-wear  · Continue Vit D and Calcium for 4 more weeks  · See back PRN      History of Present Illness:   Chief Complaint: Right foot stress reaction    Adonay Arvizu is a 61 y o  female who is being seen in follow-up for Right foot injury  When we last saw she we recommended CAM boot  Pain has improved  Residual pain is only with strenuous walking     Pain/symptom timing:  Worse during the day when active  Pain/symptom context:  Worse with activites and work  Pain/symptom modifying factors:  Rest makes better, activities make worse  Pain/symptom associated signs/symptoms: none    Prior treatment   · NSAIDsYes   · Injections No   · Bracing/Orthotics Yes    · Physical Therapy No     Orthopedic Surgical History:   See below    Past Medical, Surgical and Social History:  Past Medical History:  has a past medical history of GERD (gastroesophageal reflux disease), History of cervical dysplasia, Hypertension, Mitral regurgitation, Obesity, Postmenopausal bleeding, Rash, Stress incontinence, Thrombophlebitis (Early 1990's), and Wears glasses    Problem List: does not have any pertinent problems on file  Past Surgical History:  has a past surgical history that includes [place] tooth extraction and pr hysteroscopy,w/endo bx (N/A, 7/24/2018)  Family History: family history includes Coronary artery disease in her sister; No Known Problems in her daughter, daughter, father, maternal grandfather, maternal grandmother, mother, paternal aunt, paternal aunt, paternal grandfather, and paternal grandmother  Social History:  reports that she quit smoking about 12 years ago  Her smoking use included cigarettes  She has a 15 00 pack-year smoking history  She has never used smokeless tobacco  She reports that she does not drink alcohol or use drugs  Current Medications: has a current medication list which includes the following prescription(s): amlodipine, aspirin, aspirin, vitamin d, ergocalciferol, ibuprofen, metoprolol succinate, and triamterene-hydrochlorothiazide  Allergies: is allergic to no active allergies  Review of Systems:  General- denies fever/chills  HEENT- denies hearing loss or sore throat  Eyes- denies eye pain or visual disturbances, denies red eyes  Respiratory- denies cough or SOB  Cardio- denies chest pain or palpitations  GI- denies abdominal pain  Endocrine- denies urinary frequency  Urinary- denies pain with urination  Musculoskeletal- Negative except noted above  Skin- denies rashes or wounds  Neurological- denies dizziness or headache  Psychiatric- denies anxiety or difficulty concentrating    Physical Exam:   There were no vitals taken for this visit  General/Constitutional: No apparent distress: well-nourished and well developed    Eyes: normal ocular motion  Lymphatic: No appreciable lymphadenopathy  Respiratory: Non-labored breathing  Vascular: No edema, swelling or tenderness, except as noted in detailed exam   Integumentary: No impressive skin lesions present, except as noted in detailed exam   Neuro: No ataxia or tremors noted  Psych: Normal mood and affect, oriented to person, place and time  Appropriate affect  Musculoskeletal: Normal, except as noted in detailed exam and in HPI  Examination    Right    Gait Normal   Musculoskeletal No TTP    Skin Normal       Nails Normal    Range of Motion  20 degrees dorsiflexion, 30 degrees plantarflexion  Subtalar motion: normal    Stability Stable    Muscle Strength 5/5 tibialis anterior  5/5 gastrocnemius-soleus  5/5 posterior tibialis  5/5 peroneal/eversion strength  5/5 EHL  5/5 FHL    Neurologic Normal    Sensation  Intact to light touch throughout sural, saphenous, superficial peroneal, deep peroneal and medial/lateral plantar nerve distributions  [place]-Kera 5 07 filament (10g) testing  deferred  Cardiovascular Brisk capillary refill < 2 seconds,intact DP and PT pulses    Special Tests None      Imaging Studies:   No new imaging    Scribe Attestation    I,:   Rolly Pereyra PA-C am acting as a scribe while in the presence of the attending physician :        I,:   Tracy Isaacs MD personally performed the services described in this documentation    as scribed in my presence :                Larose Fothergill Lachman, MD  Foot & Ankle Surgery   Department of [address] [place] [address]      I personally performed the service  Larose Fothergill Lachman, MD

## 2020-09-25 NOTE — PATIENT INSTRUCTIONS
Recommend stiff soled supportive sneaker  Avoid any sandal, slipper, or barefoot walking  Continue taking the following supplements for at least 4 more weeks: Vitamin D 4000 units per day and Calcium 1200 mg per day  This will help with bone healing

## 2020-11-27 ENCOUNTER — VBI (OUTPATIENT)
Dept: ADMINISTRATIVE | Facility: OTHER | Age: 60
End: 2020-11-27

## 2020-12-22 DIAGNOSIS — I10 ESSENTIAL HYPERTENSION: ICD-10-CM

## 2020-12-22 RX ORDER — METOPROLOL SUCCINATE 100 MG/1
100 TABLET, EXTENDED RELEASE ORAL EVERY MORNING
Qty: 30 TABLET | Refills: 0 | Status: SHIPPED | OUTPATIENT
Start: 2020-12-22 | End: 2021-02-22 | Stop reason: SDUPTHER

## 2021-01-26 DIAGNOSIS — I10 ESSENTIAL HYPERTENSION: ICD-10-CM

## 2021-01-26 RX ORDER — METOPROLOL SUCCINATE 100 MG/1
TABLET, EXTENDED RELEASE ORAL
Qty: 30 TABLET | Refills: 0 | OUTPATIENT
Start: 2021-01-26

## 2021-02-04 DIAGNOSIS — I10 ESSENTIAL HYPERTENSION: ICD-10-CM

## 2021-02-04 RX ORDER — AMLODIPINE BESYLATE 2.5 MG/1
2.5 TABLET ORAL EVERY MORNING
Qty: 90 TABLET | Refills: 0 | Status: SHIPPED | OUTPATIENT
Start: 2021-02-04 | End: 2021-05-20

## 2021-02-22 ENCOUNTER — OFFICE VISIT (OUTPATIENT)
Dept: FAMILY MEDICINE CLINIC | Facility: CLINIC | Age: 61
End: 2021-02-22
Payer: COMMERCIAL

## 2021-02-22 VITALS
DIASTOLIC BLOOD PRESSURE: 80 MMHG | SYSTOLIC BLOOD PRESSURE: 130 MMHG | RESPIRATION RATE: 16 BRPM | HEIGHT: 64 IN | HEART RATE: 72 BPM | BODY MASS INDEX: 46.61 KG/M2 | TEMPERATURE: 96.3 F | WEIGHT: 273 LBS

## 2021-02-22 DIAGNOSIS — Z13.220 SCREENING FOR HYPERLIPIDEMIA: ICD-10-CM

## 2021-02-22 DIAGNOSIS — I10 ESSENTIAL HYPERTENSION: ICD-10-CM

## 2021-02-22 DIAGNOSIS — Z12.31 ENCOUNTER FOR SCREENING MAMMOGRAM FOR BREAST CANCER: ICD-10-CM

## 2021-02-22 DIAGNOSIS — M84.374A STRESS REACTION OF RIGHT FOOT, INITIAL ENCOUNTER: ICD-10-CM

## 2021-02-22 DIAGNOSIS — R73.03 PRE-DIABETES: ICD-10-CM

## 2021-02-22 DIAGNOSIS — G47.33 OBSTRUCTIVE SLEEP APNEA: ICD-10-CM

## 2021-02-22 DIAGNOSIS — Z12.12 SCREENING FOR COLORECTAL CANCER: ICD-10-CM

## 2021-02-22 DIAGNOSIS — Z12.11 SCREENING FOR COLORECTAL CANCER: ICD-10-CM

## 2021-02-22 DIAGNOSIS — Z00.00 ANNUAL PHYSICAL EXAM: Primary | ICD-10-CM

## 2021-02-22 DIAGNOSIS — Z11.59 NEED FOR HEPATITIS C SCREENING TEST: ICD-10-CM

## 2021-02-22 PROBLEM — E66.01 CLASS 3 SEVERE OBESITY DUE TO EXCESS CALORIES WITHOUT SERIOUS COMORBIDITY WITH BODY MASS INDEX (BMI) OF 45.0 TO 49.9 IN ADULT (HCC): Status: ACTIVE | Noted: 2021-02-22

## 2021-02-22 PROBLEM — E66.813 CLASS 3 SEVERE OBESITY DUE TO EXCESS CALORIES WITHOUT SERIOUS COMORBIDITY WITH BODY MASS INDEX (BMI) OF 45.0 TO 49.9 IN ADULT (HCC): Status: ACTIVE | Noted: 2021-02-22

## 2021-02-22 PROCEDURE — 99396 PREV VISIT EST AGE 40-64: CPT | Performed by: FAMILY MEDICINE

## 2021-02-22 PROCEDURE — 1036F TOBACCO NON-USER: CPT | Performed by: FAMILY MEDICINE

## 2021-02-22 PROCEDURE — 3008F BODY MASS INDEX DOCD: CPT | Performed by: FAMILY MEDICINE

## 2021-02-22 PROCEDURE — 3725F SCREEN DEPRESSION PERFORMED: CPT | Performed by: FAMILY MEDICINE

## 2021-02-22 RX ORDER — METOPROLOL SUCCINATE 100 MG/1
100 TABLET, EXTENDED RELEASE ORAL EVERY MORNING
Qty: 90 TABLET | Refills: 1 | Status: SHIPPED | OUTPATIENT
Start: 2021-02-22 | End: 2021-08-18

## 2021-02-22 RX ORDER — TRIAMTERENE AND HYDROCHLOROTHIAZIDE 37.5; 25 MG/1; MG/1
1 TABLET ORAL DAILY
Qty: 90 TABLET | Refills: 1 | Status: SHIPPED | OUTPATIENT
Start: 2021-02-22 | End: 2021-08-16

## 2021-02-22 NOTE — ASSESSMENT & PLAN NOTE
Lab Results   Component Value Date    HGBA1C 6 1 (H) 10/19/2018    HGBA1C 6 1 10/19/2018    HGBA1C 5 7 (H) 12/17/2015     Lab Results   Component Value Date    LDLCALC 94 12/17/2015    CREATININE 1 16 08/29/2019     Counseled on the importance of healthy eating, regular physical activity     Repeat A1C

## 2021-02-22 NOTE — ASSESSMENT & PLAN NOTE
BP Readings from Last 3 Encounters:   02/22/21 130/80   09/25/20 137/84   08/21/20 139/84     Lab Results   Component Value Date    CREATININE 1 16 08/29/2019    EGFR 52 08/29/2019       Controlled today, continue Amlodipine 2 5mg, Metoprolol succinate 100mg, Triamterene-HCTZ 37 5-25mg

## 2021-02-22 NOTE — PROGRESS NOTES
[facility]      Date of Service: 21  Primary Care Provider:   Dia Chavez MD     Name: Israel Ramey       : 1960       Age:60 y o  Sex: female      MRN: [id number]      Chief Complaint:Annual Exam     Assessment and Plan:  61 y o  female exam      1  Health Maintenance  - Colon Cancer Screening: Cologuard ordered today  - Cervical Cancer Screening: Normal PAP, negative HPV in May 2018  - Breast Cancer Screening: mammogram ordered today  - Labs: as below  - Immunizations: Reviewed  Recommend yearly flu vaccine  2  Other diagnoses addressed today:   Problem List Items Addressed This Visit        Respiratory    Obstructive sleep apnea     Diagnosed with mild sleep apnea, recommended weight loss            Cardiovascular and Mediastinum    Essential hypertension     BP Readings from Last 3 Encounters:   21 130/80   20 137/84   20 139/84     Lab Results   Component Value Date    CREATININE 1 16 2019    EGFR 52 2019       Controlled today, continue Amlodipine 2 5mg, Metoprolol succinate 100mg, Triamterene-HCTZ 37 5-25mg           Relevant Medications    metoprolol succinate (TOPROL-XL) 100 mg 24 hr tablet    triamterene-hydrochlorothiazide (MAXZIDE-25) 37 5-25 mg per tablet    Other Relevant Orders    Basic metabolic panel       Other    Pre-diabetes     Lab Results   Component Value Date    HGBA1C 6 1 (H) 10/19/2018    HGBA1C 6 1 10/19/2018    HGBA1C 5 7 (H) 2015     Lab Results   Component Value Date    LDLCALC 94 2015    CREATININE 1 16 2019     Counseled on the importance of healthy eating, regular physical activity     Repeat A1C         Relevant Orders    Hemoglobin A1C      Other Visit Diagnoses     Annual physical exam    -  Primary    Relevant Orders    Hepatitis C Antibody ([facility])    Stress reaction of right foot, initial encounter        Relevant Orders    DXA bone density spine hip and pelvis    Encounter for screening mammogram for breast cancer        Relevant Orders    Mammo screening bilateral w cad    Screening for colorectal cancer        Relevant Orders    Cologuard    Need for hepatitis C screening test        Relevant Orders    Hepatitis C Antibody ([facility])    Screening for hyperlipidemia        Relevant Orders    Lipid Panel with Direct LDL reflex           Immunizations and preventive care screenings were discussed with patient today  Appropriate education was printed on patient's after visit summary  Counseling:  · Alcohol/drug use: discussed moderation in alcohol intake, the recommendations for healthy alcohol use, and avoidance of illicit drug use  · Dental Health: discussed importance of regular tooth brushing, flossing, and dental visits  · Injury prevention: discussed safety/seat belts, safety helmets, smoke detectors, carbon dioxide detectors    · Exercise: the importance of regular exercise/physical activity was discussed  Recommend exercise 3-5 times per week for at least 30 minutes  BMI Counseling: Body mass index is 46 86 kg/m²  The BMI is above normal  Nutrition recommendations include encouraging healthy choices of fruits and vegetables, moderation in carbohydrate intake and reducing intake of saturated and trans fat  Exercise recommendations include exercising 3-5 times per week and strength training exercises  Counseled on importance of healthy eating, and regular physical activity  Recommended a primarily plant-based diet,  Mediterranean diet  Follow up next physical in 1 year  Subjective:    Roy Aschoff is a 61 y o  female and is here for a comprehensive physical exam      Acute Complaints:     She would like to try to loose weight  She finds it hard because she likes food, espiecially carbohydrates and she does not eat the healthiest diet       Hypertension  She is on triamterene-HCTZ 37 5-25 mg, metoprolol succinate 100 mg, amlodipine 2 5 mg    Diet and Physical Activity  · Diet/Nutrition: does not follow a well balanced diet  · Exercise: no formal exercise  General Health  · Vision: no vision problems and goes for regular eye exams  · Dental: regular dental visits  Gyn Health: Follows GYN     No LMP recorded   Patient is postmenopausal       Histories Updated and Reviewed 2/22/2021:  Patient's Medications   New Prescriptions    No medications on file   Previous Medications    AMLODIPINE (NORVASC) 2 5 MG TABLET    Take 1 tablet (2 5 mg total) by mouth every morning    ASPIRIN (ECOTRIN LOW STRENGTH) 81 MG EC TABLET    Take 1 tablet (81 mg total) by mouth 2 (two) times a day For DVT ppx    CHOLECALCIFEROL (VITAMIN D) 2000 UNITS CAPS    Take 1 tablet by mouth daily    IBUPROFEN (MOTRIN) 200 MG TABLET    Take 400 mg by mouth every 6 (six) hours as needed for mild pain   Modified Medications    Modified Medication Previous Medication    METOPROLOL SUCCINATE (TOPROL-XL) 100 MG 24 HR TABLET metoprolol succinate (TOPROL-XL) 100 mg 24 hr tablet       Take 1 tablet (100 mg total) by mouth every morning    Take 1 tablet (100 mg total) by mouth every morning    TRIAMTERENE-HYDROCHLOROTHIAZIDE (MAXZIDE-25) 37 5-25 MG PER TABLET triamterene-hydrochlorothiazide (MAXZIDE-25) 37 5-25 mg per tablet       Take 1 tablet by mouth daily    TAKE 1 TABLET BY MOUTH EVERY DAY   Discontinued Medications    ASPIRIN (ECOTRIN) 325 MG EC TABLET    Take 1 tablet (325 mg total) by mouth 2 (two) times a day    ERGOCALCIFEROL (VITAMIN D2) 50,000 UNITS    Take 1 capsule (50,000 Units total) by mouth once a week for 8 doses     No Known Allergies  Past Medical History:   Diagnosis Date    GERD (gastroesophageal reflux disease)     No medications    History of cervical dysplasia     History cryosurgery    Hypertension     Mitral regurgitation     Obesity     Postmenopausal bleeding     Rash     On left upper thigh and under abdominal fold- is seen by dermatologist    Stress incontinence     Thrombophlebitis Early     Superficial, right calf, from birth control pill    Wears glasses      Social History     Socioeconomic History    Marital status: /Civil Union     Spouse name: Not on file    Number of children: Not on file    Years of education: Not on file    Highest education level: Not on file   Occupational History    Not on file   Social Needs    Financial resource strain: Not on file    Food insecurity     Worry: Not on file     Inability: Not on file   [employer] needs     Medical: Not on file     Non-medical: Not on file   Tobacco Use    Smoking status: Former Smoker     Packs/day: 0 75     Years: 20 00     Pack years: 15 00     Types: Cigarettes     Quit date:      Years since quittin 1    Smokeless tobacco: Never Used    Tobacco comment: 1 ppd x15 years    Substance and Sexual Activity    Alcohol use: Yes     Comment: very rarely    Drug use: No    Sexual activity: Not on file   Lifestyle    Physical activity     Days per week: Not on file     Minutes per session: Not on file    Stress: Not on file   Relationships    Social connections     Talks on phone: Not on file     Gets together: Not on file     Attends Muslim service: Not on file     Active member of club or organization: Not on file     Attends meetings of clubs or organizations: Not on file     Relationship status: Not on file    Intimate partner violence     Fear of current or ex partner: Not on file     Emotionally abused: Not on file     Physically abused: Not on file     Forced sexual activity: Not on file   Other Topics Concern    Not on file   Social History Narrative    Not on file     Immunization History   Administered Date(s) Administered    INFLUENZA 10/09/2015, 10/31/2019    Influenza Recombinant Preservative Free Im 10/20/2020    Influenza, seasonal, injectable 2014    Tuberculin Skin Test-PPD Intradermal 10/09/2015       Objective:  /80 Pulse 72   Temp (!) 96 3 °F (35 7 °C)   Resp 16   Ht 5' 4" (1 626 m)   Wt 124 kg (273 lb)   BMI 46 86 kg/m²   BP Readings from Last 3 Encounters:   02/22/21 130/80   09/25/20 137/84   08/21/20 139/84      Wt Readings from Last 3 Encounters:   02/22/21 124 kg (273 lb)   01/13/20 122 kg (270 lb)   11/05/19 122 kg (269 lb)      Physical Exam  Constitutional:       General: She is not in acute distress  Appearance: Normal appearance  She is obese  She is not ill-appearing or toxic-appearing  HENT:      Head: Normocephalic and atraumatic  Right Ear: Tympanic membrane, ear canal and external ear normal       Left Ear: Tympanic membrane, ear canal and external ear normal    Eyes:      Extraocular Movements: Extraocular movements intact  Conjunctiva/sclera: Conjunctivae normal    Neck:      Musculoskeletal: Normal range of motion and neck supple  Cardiovascular:      Rate and Rhythm: Normal rate and regular rhythm  Pulses: Normal pulses  Heart sounds: Normal heart sounds  Pulmonary:      Effort: Pulmonary effort is normal  No respiratory distress  Breath sounds: Normal breath sounds  Abdominal:      General: There is no distension  Palpations: There is no mass  Tenderness: There is no abdominal tenderness  There is no guarding or rebound  Musculoskeletal: Normal range of motion  Skin:     Findings: No erythema or rash  Neurological:      General: No focal deficit present  Mental Status: She is alert and oriented to person, place, and time     Psychiatric:         Mood and Affect: Mood normal          Behavior: Behavior normal          Lab Results   Component Value Date    SODIUM 140 08/29/2019    K 3 6 08/29/2019     08/29/2019    CO2 25 08/29/2019    BUN 23 08/29/2019    CREATININE 1 16 08/29/2019    GLUC 95 08/29/2019    CALCIUM 9 3 08/29/2019     Lab Results   Component Value Date    HGBA1C 6 1 (H) 10/19/2018     Lab Results   Component Value Date WBC 9 08 08/29/2019    HGB 14 9 08/29/2019    HCT 45 6 08/29/2019    MCV 91 08/29/2019     08/29/2019     No results found for: CHOLESTEROL  Lab Results   Component Value Date    HDL 49 12/17/2015    HDL 45 02/08/2014     Lab Results   Component Value Date    TRIG 97 12/17/2015    TRIG 90 02/08/2014     No results found for: [facility]  Lab Results   Component Value Date    [address] 94 12/17/2015         Patient Care Team:  Justin Danielson MD as PCP - General (Family Medicine)  [facility]  Hebert Cárdenas MD as PCP - PCP-[facility] Itz Hassan MD    Note: Portions of the record may have been created with voice recognition software  Occasional wrong word or "sound a like" substitutions may have occurred due to the inherent limitations of voice recognition software  Read the chart carefully and recognize, using context, where substitutions have occurred

## 2021-02-22 NOTE — PATIENT INSTRUCTIONS
Wellness Visit for Adults   AMBULATORY CARE:   A wellness visit  is when you see your healthcare provider to get screened for health problems  Your healthcare provider will also give you advice on how to stay healthy  Write down your questions so you remember to ask them  Ask your healthcare provider how often you should have a wellness visit  What happens at a wellness visit:  Your healthcare provider will ask about your health, and your family history of health problems  This includes high blood pressure, heart disease, and cancer  He or she will ask if you have symptoms that concern you, if you smoke, and about your mood  You may also be asked about your intake of medicines, supplements, food, and alcohol  Any of the following may be done:  · Your weight  will be checked  Your height may also be checked so your body mass index (BMI) can be calculated  Your BMI shows if you are at a healthy weight  · Your blood pressure  and heart rate will be checked  Your temperature may also be checked  · Blood and urine tests  may be done  Blood tests may be done to check your cholesterol levels  Abnormal cholesterol levels increase your risk for heart disease and stroke  You may also need a blood or urine test to check for diabetes if you are at increased risk  Urine tests may be done to look for signs of an infection or kidney disease  · A physical exam  includes checking your heartbeat and lungs with a stethoscope  Your healthcare provider may also check your skin to look for sun damage  · Screening tests  may be recommended  A screening test is done to check for diseases that may not cause symptoms  The screening tests you may need depend on your age, gender, family history, and lifestyle habits  For example, colorectal screening may be recommended if you are 48years old or older  Screening tests you need if you are a woman:   · A Pap smear  is used to screen for cervical cancer   Pap smears are usually done every 3 to 5 years depending on your age  You may need them more often if you have had abnormal Pap smear test results in the past  Ask your healthcare provider how often you should have a Pap smear  · A mammogram  is an x-ray of your breasts to screen for breast cancer  Experts recommend mammograms every 2 years starting at age 48 years  You may need a mammogram at age 52 years or younger if you have an increased risk for breast cancer  Talk to your healthcare provider about when you should start having mammograms and how often you need them  Vaccines you may need:   · Get an influenza vaccine  every year  The influenza vaccine protects you from the flu  Several types of viruses cause the flu  The viruses change over time, so new vaccines are made each year  · Get a tetanus-diphtheria (Td) booster vaccine  every 10 years  This vaccine protects you against tetanus and diphtheria  Tetanus is a severe infection that may cause painful muscle spasms and lockjaw  Diphtheria is a severe bacterial infection that causes a thick covering in the back of your mouth and throat  · Get a human papillomavirus (HPV) vaccine  if you are female and aged 23 to 32 or male 23 to 24 and never received it  This vaccine protects you from HPV infection  HPV is the most common infection spread by sexual contact  HPV may also cause vaginal, penile, and anal cancers  · Get a pneumococcal vaccine  if you are aged 72 years or older  The pneumococcal vaccine is an injection given to protect you from pneumococcal disease  Pneumococcal disease is an infection caused by pneumococcal bacteria  The infection may cause pneumonia, meningitis, or an ear infection  · Get a shingles vaccine  if you are 60 or older, even if you have had shingles before  The shingles vaccine is an injection to protect you from the varicella-zoster virus  This is the same virus that causes chickenpox   Shingles is a painful rash that develops in people who had chickenpox or have been exposed to the virus  How to eat healthy:  My Plate is a model for planning healthy meals  It shows the types and amounts of foods that should go on your plate  Fruits and vegetables make up about half of your plate, and grains and protein make up the other half  A serving of dairy is included on the side of your plate  The amount of calories and serving sizes you need depends on your age, gender, weight, and height  Examples of healthy foods are listed below:  · Eat a variety of vegetables  such as dark green, red, and orange vegetables  You can also include canned vegetables low in sodium (salt) and frozen vegetables without added butter or sauces  · Eat a variety of fresh fruits , canned fruit in 100% juice, frozen fruit, and dried fruit  · Include whole grains  At least half of the grains you eat should be whole grains  Examples include whole-wheat bread, wheat pasta, brown rice, and whole-grain cereals such as oatmeal     · Eat a variety of protein foods such as seafood (fish and shellfish), lean meat, and poultry without skin (turkey and chicken)  Examples of lean meats include pork leg, shoulder, or tenderloin, and beef round, sirloin, tenderloin, and extra lean ground beef  Other protein foods include eggs and egg substitutes, beans, peas, soy products, nuts, and seeds  · Choose low-fat dairy products such as skim or 1% milk or low-fat yogurt, cheese, and cottage cheese  · Limit unhealthy fats  such as butter, hard margarine, and shortening  Exercise:  Exercise at least 30 minutes per day on most days of the week  Some examples of exercise include walking, biking, dancing, and swimming  You can also fit in more physical activity by taking the stairs instead of the elevator or parking farther away from stores  Include muscle strengthening activities 2 days each week  Regular exercise provides many health benefits   It helps you manage your weight, and decreases your risk for type 2 diabetes, heart disease, stroke, and high blood pressure  Exercise can also help improve your mood  Ask your healthcare provider about the best exercise plan for you  General health and safety guidelines:   · Do not smoke  Nicotine and other chemicals in cigarettes and cigars can cause lung damage  Ask your healthcare provider for information if you currently smoke and need help to quit  E-cigarettes or smokeless tobacco still contain nicotine  Talk to your healthcare provider before you use these products  · Limit alcohol  A drink of alcohol is 12 ounces of beer, 5 ounces of wine, or 1½ ounces of liquor  · Lose weight, if needed  Being overweight increases your risk of certain health conditions  These include heart disease, high blood pressure, type 2 diabetes, and certain types of cancer  · Protect your skin  Do not sunbathe or use tanning beds  Use sunscreen with a SPF 15 or higher  Apply sunscreen at least 15 minutes before you go outside  Reapply sunscreen every 2 hours  Wear protective clothing, hats, and sunglasses when you are outside  · Drive safely  Always wear your seatbelt  Make sure everyone in your car wears a seatbelt  A seatbelt can save your life if you are in an accident  Do not use your cell phone when you are driving  This could distract you and cause an accident  Pull over if you need to make a call or send a text message  · Practice safe sex  Use latex condoms if are sexually active and have more than one partner  Your healthcare provider may recommend screening tests for sexually transmitted infections (STIs)  · Wear helmets, lifejackets, and protective gear  Always wear a helmet when you ride a bike or motorcycle, go skiing, or play sports that could cause a head injury  Wear protective equipment when you play sports  Wear a lifejacket when you are on a boat or doing water sports      © Copyright [employer] 2020 Information is for End User's use only and may not be sold, redistributed or otherwise used for commercial purposes  All illustrations and images included in CareNotes® are the copyrighted property of A D A M , Inc  or [employer] Mejia  The above information is an  only  It is not intended as medical advice for individual conditions or treatments  Talk to your doctor, nurse or pharmacist before following any medical regimen to see if it is safe and effective for you  Obesity   AMBULATORY CARE:   Obesity  is when your body mass index (BMI) is greater than 30  Your healthcare provider will use your height and weight to measure your BMI  The risks of obesity include  many health problems, such as injuries or physical disability  You may need tests to check for the following:  · Diabetes    · High blood pressure or high cholesterol    · Heart disease    · Gallbladder or liver disease    · Cancer of the colon, breast, prostate, liver, or kidney    · Sleep apnea    · Arthritis or gout    Seek care immediately if:   · You have a severe headache, confusion, or difficulty speaking  · You have weakness on one side of your body  · You have chest pain, sweating, or shortness of breath  Contact your healthcare provider if:   · You have symptoms of gallbladder or liver disease, such as pain in your upper abdomen  · You have knee or hip pain and discomfort while walking  · You have symptoms of diabetes, such as intense hunger and thirst, and frequent urination  · You have symptoms of sleep apnea, such as snoring or daytime sleepiness  · You have questions or concerns about your condition or care  Treatment for obesity  focuses on helping you lose weight to improve your health  Even a small decrease in BMI can reduce the risk for many health problems  Your healthcare provider will help you set a weight-loss goal   · Lifestyle changes  are the first step in treating obesity   These include making healthy food choices and getting regular physical activity  Your healthcare provider may suggest a weight-loss program that involves coaching, education, and therapy  · Medicine  may help you lose weight when it is used with a healthy diet and physical activity  · Surgery  can help you lose weight if you are very obese and have other health problems  There are several types of weight-loss surgery  Ask your healthcare provider for more information  Be successful losing weight:   · Set small, realistic goals  An example of a small goal is to walk for 20 minutes 5 days a week  Anther goal is to lose 5% of your body weight  · Tell friends, family members, and coworkers about your goals  and ask for their support  Ask a friend to lose weight with you, or join a weight-loss support group  · Identify foods or triggers that may cause you to overeat , and find ways to avoid them  Remove tempting high-calorie foods from your home and workplace  Place a bowl of fresh fruit on your kitchen counter  If stress causes you to eat, then find other ways to cope with stress  · Keep a diary to track what you eat and drink  Also write down how many minutes of physical activity you do each day  Weigh yourself once a week and record it in your diary  Eating changes: You will need to eat 500 to 1,000 fewer calories each day than you currently eat to lose 1 to 2 pounds a week  The following changes will help you cut calories:  · Eat smaller portions  Use small plates, no larger than 9 inches in diameter  Fill your plate half full of fruits and vegetables  Measure your food using measuring cups until you know what a serving size looks like  · Eat 3 meals and 1 or 2 snacks each day  Plan your meals in advance  Kirill Cormier and eat at home most of the time  Eat slowly  Do not skip meals  Skipping meals can lead to overeating later in the day  This can make it harder for you to lose weight   Talk with a dietitian to help you make a meal plan and schedule that is right for you  · Eat fruits and vegetables at every meal   They are low in calories and high in fiber, which makes you feel full  Do not add butter, margarine, or cream sauce to vegetables  Use herbs to season steamed vegetables  · Eat less fat and fewer fried foods  Eat more baked or grilled chicken and fish  These protein sources are lower in calories and fat than red meat  Limit fast food  Dress your salads with olive oil and vinegar instead of bottled dressing  · Limit the amount of sugar you eat  Do not drink sugary beverages  Limit alcohol  Activity changes:  Physical activity is good for your body in many ways  It helps you burn calories and build strong muscles  It decreases stress and depression, and improves your mood  It can also help you sleep better  Talk to your healthcare provider before you begin an exercise program   · Exercise for at least 30 minutes 5 days a week  Start slowly  Set aside time each day for physical activity that you enjoy and that is convenient for you  It is best to do both weight training and an activity that increases your heart rate, such as walking, bicycling, or swimming  · Find ways to be more active  Do yard work and housecleaning  Walk up the stairs instead of using elevators  Spend your leisure time going to events that require walking, such as outdoor festivals or fairs  This extra physical activity can help you lose weight and keep it off  Follow up with your healthcare provider as directed: You may need to meet with a dietitian  Write down your questions so you remember to ask them during your visits  © Copyright [address] Information is for End User's use only and may not be sold, redistributed or otherwise used for commercial purposes   All illustrations and images included in CareNotes® are the copyrighted property of A D A M , Inc  or [facility] Radha Cote   The above information is an  only  It is not intended as medical advice for individual conditions or treatments  Talk to your doctor, nurse or pharmacist before following any medical regimen to see if it is safe and effective for you  Mediterranean Diet   AMBULATORY CARE:   A Mediterranean diet  is a meal plan that includes foods that are commonly eaten in countries that border [place]  This meal plan may provide several health benefits  These include losing or maintaining weight, and decreasing blood pressure, blood sugar, and cholesterol levels  It may also help protect against certain health conditions such as heart disease, cancer, type 2 diabetes, and Alzheimer disease  Work with a dietitian to develop a meal plan that is right for you  Foods to include in the [facility] diet:   · Include fruits and vegetables in each meal   Eat a variety of fresh fruits and vegetables  · Choose whole grains every day  These foods include whole-grain breads, pastas, and cereals  It also includes brown rice, quinoa, and millet  · Use unsaturated fats instead of saturated fats  Cook with olive or canola oil  Limit saturated fats, such as butter, margarine, and shortening  Saturated fat is an unhealthy fat that can increase your cholesterol levels  · Choose plant foods, poultry, and fish as your main sources of protein  ? Eat plant-based foods that provide protein,  such as lentils, beans, chickpeas, nuts, and seeds  Choose mostly plant-based foods in place of meat on most days of the week  ? Eat protein foods high in omega-3 fats  Fish high in omega-3 fats include salmon, trout, and tuna  Include these types of fish 1 or 2 times each week  Limit fish high in mercury, such as shark, swordfish, tilefish, and he mackerel  Omega-3 fats are also found in walnuts and flaxseed  ? Choose poultry (chicken or turkey)  without skin instead of red meat  Red meat is high in saturated fat   Limit eggs and high-fat meats, such as garrett, sausage, and hot dogs  · Choose low-fat dairy foods  such as nonfat or 1% milk, or low-fat almond, cashew, or soy milk  Other examples include low-fat cheese, yogurt, and cottage cheese  · Limit sweets  Limit your intake of high-sugar foods, such as soda, desserts, and candy  · Talk to your healthcare provider about alcohol  Studies have shown that moderate intake of wine may reduce the risk of heart disease  A moderate amount of wine is 1 serving for women and men 65 years and older each day  Two servings is recommended for men 24to 59years of age each day  A serving of wine is 5 ounces  Other things you need to know if you follow the Mediterranean diet:   · Include foods high in iron and vitamin C   Plant-based foods that are high in iron include spinach, beans, tofu, and artichoke  Eat a serving of vitamin C with any iron-rich food to help your body absorb more iron  Examples include oranges, strawberries, cantaloupe, broccoli, and yellow peppers  · Get regular physical activity  The Mediterranean diet will have the most benefit if you get regular physical activity  Get 30 minutes of physical activity at least 5 days a week  Choose physical activities that increase your heart rate  Examples include walking, hiking, swimming, and riding a bike  Ask your healthcare provider about the best exercise plan for you  © Copyright [address] Information is for End User's use only and may not be sold, redistributed or otherwise used for commercial purposes  All illustrations and images included in CareNotes® are the copyrighted property of [employer]  or [facility] Radha Cote   The above information is an  only  It is not intended as medical advice for individual conditions or treatments  Talk to your doctor, nurse or pharmacist before following any medical regimen to see if it is safe and effective for you

## 2021-04-22 ENCOUNTER — HOSPITAL ENCOUNTER (INPATIENT)
Facility: HOSPITAL | Age: 61
LOS: 2 days | Discharge: HOME | DRG: 178 | End: 2021-04-25
Attending: EMERGENCY MEDICINE | Admitting: INTERNAL MEDICINE
Payer: COMMERCIAL

## 2021-04-22 DIAGNOSIS — R19.7 DIARRHEA, UNSPECIFIED TYPE: ICD-10-CM

## 2021-04-22 DIAGNOSIS — U07.1 COVID-19 VIRUS INFECTION: ICD-10-CM

## 2021-04-22 DIAGNOSIS — U07.1 COVID-19: ICD-10-CM

## 2021-04-22 DIAGNOSIS — R21 RASH: ICD-10-CM

## 2021-04-22 DIAGNOSIS — R77.8 ELEVATED TROPONIN: Primary | ICD-10-CM

## 2021-04-22 DIAGNOSIS — E87.6 HYPOKALEMIA: ICD-10-CM

## 2021-04-22 LAB
BASOPHILS # BLD AUTO: 0.01 THOUSANDS/ΜL (ref 0–0.1)
BASOPHILS NFR BLD AUTO: 0 % (ref 0–1)
EOSINOPHIL # BLD AUTO: 0 THOUSAND/ΜL (ref 0–0.61)
EOSINOPHIL NFR BLD AUTO: 0 % (ref 0–6)
ERYTHROCYTE [DISTWIDTH] IN BLOOD BY AUTOMATED COUNT: 13.2 % (ref 11.6–15.1)
HCT VFR BLD AUTO: 47.6 % (ref 34.8–46.1)
HGB BLD-MCNC: 15.7 G/DL (ref 11.5–15.4)
HOLD SPECIMEN: NORMAL
IMM GRANULOCYTES # BLD AUTO: 0.01 THOUSAND/UL (ref 0–0.2)
IMM GRANULOCYTES NFR BLD AUTO: 0 % (ref 0–2)
LYMPHOCYTES # BLD AUTO: 1.27 THOUSANDS/ΜL (ref 0.6–4.47)
LYMPHOCYTES NFR BLD AUTO: 26 % (ref 14–44)
MCH RBC QN AUTO: 28.6 PG (ref 26.8–34.3)
MCHC RBC AUTO-ENTMCNC: 33 G/DL (ref 31.4–37.4)
MCV RBC AUTO: 87 FL (ref 82–98)
MONOCYTES # BLD AUTO: 0.37 THOUSAND/ΜL (ref 0.17–1.22)
MONOCYTES NFR BLD AUTO: 8 % (ref 4–12)
NEUTROPHILS # BLD AUTO: 3.28 THOUSANDS/ΜL (ref 1.85–7.62)
NEUTS SEG NFR BLD AUTO: 66 % (ref 43–75)
NRBC BLD AUTO-RTO: 0 /100 WBCS
PLATELET # BLD AUTO: 198 THOUSANDS/UL (ref 149–390)
PMV BLD AUTO: 10.4 FL (ref 8.9–12.7)
RBC # BLD AUTO: 5.49 MILLION/UL (ref 3.81–5.12)
WBC # BLD AUTO: 4.94 THOUSAND/UL (ref 4.31–10.16)

## 2021-04-22 PROCEDURE — 93005 ELECTROCARDIOGRAM TRACING: CPT

## 2021-04-22 PROCEDURE — 82550 ASSAY OF CK (CPK): CPT | Performed by: NURSE PRACTITIONER

## 2021-04-22 PROCEDURE — 80053 COMPREHEN METABOLIC PANEL: CPT | Performed by: EMERGENCY MEDICINE

## 2021-04-22 PROCEDURE — 83036 HEMOGLOBIN GLYCOSYLATED A1C: CPT | Performed by: NURSE PRACTITIONER

## 2021-04-22 PROCEDURE — 99285 EMERGENCY DEPT VISIT HI MDM: CPT

## 2021-04-22 PROCEDURE — 85025 COMPLETE CBC W/AUTO DIFF WBC: CPT | Performed by: EMERGENCY MEDICINE

## 2021-04-22 PROCEDURE — 83605 ASSAY OF LACTIC ACID: CPT | Performed by: EMERGENCY MEDICINE

## 2021-04-22 PROCEDURE — 83880 ASSAY OF NATRIURETIC PEPTIDE: CPT | Performed by: NURSE PRACTITIONER

## 2021-04-22 PROCEDURE — 85610 PROTHROMBIN TIME: CPT | Performed by: EMERGENCY MEDICINE

## 2021-04-22 PROCEDURE — 83690 ASSAY OF LIPASE: CPT | Performed by: EMERGENCY MEDICINE

## 2021-04-22 PROCEDURE — 80061 LIPID PANEL: CPT | Performed by: NURSE PRACTITIONER

## 2021-04-22 PROCEDURE — 82553 CREATINE MB FRACTION: CPT | Performed by: NURSE PRACTITIONER

## 2021-04-22 PROCEDURE — 86140 C-REACTIVE PROTEIN: CPT | Performed by: NURSE PRACTITIONER

## 2021-04-22 PROCEDURE — U0003 INFECTIOUS AGENT DETECTION BY NUCLEIC ACID (DNA OR RNA); SEVERE ACUTE RESPIRATORY SYNDROME CORONAVIRUS 2 (SARS-COV-2) (CORONAVIRUS DISEASE [COVID-19]), AMPLIFIED PROBE TECHNIQUE, MAKING USE OF HIGH THROUGHPUT TECHNOLOGIES AS DESCRIBED BY CMS-2020-01-R: HCPCS | Performed by: EMERGENCY MEDICINE

## 2021-04-22 PROCEDURE — 99285 EMERGENCY DEPT VISIT HI MDM: CPT | Performed by: EMERGENCY MEDICINE

## 2021-04-22 PROCEDURE — U0005 INFEC AGEN DETEC AMPLI PROBE: HCPCS | Performed by: EMERGENCY MEDICINE

## 2021-04-22 PROCEDURE — 36415 COLL VENOUS BLD VENIPUNCTURE: CPT | Performed by: EMERGENCY MEDICINE

## 2021-04-22 PROCEDURE — 85379 FIBRIN DEGRADATION QUANT: CPT | Performed by: NURSE PRACTITIONER

## 2021-04-22 PROCEDURE — 85730 THROMBOPLASTIN TIME PARTIAL: CPT | Performed by: EMERGENCY MEDICINE

## 2021-04-22 PROCEDURE — 84484 ASSAY OF TROPONIN QUANT: CPT | Performed by: EMERGENCY MEDICINE

## 2021-04-22 RX ORDER — SODIUM CHLORIDE, SODIUM GLUCONATE, SODIUM ACETATE, POTASSIUM CHLORIDE, MAGNESIUM CHLORIDE, SODIUM PHOSPHATE, DIBASIC, AND POTASSIUM PHOSPHATE .53; .5; .37; .037; .03; .012; .00082 G/100ML; G/100ML; G/100ML; G/100ML; G/100ML; G/100ML; G/100ML
1000 INJECTION, SOLUTION INTRAVENOUS ONCE
Status: COMPLETED | OUTPATIENT
Start: 2021-04-22 | End: 2021-04-23

## 2021-04-22 RX ORDER — ACETAMINOPHEN 325 MG/1
650 TABLET ORAL ONCE
Status: COMPLETED | OUTPATIENT
Start: 2021-04-22 | End: 2021-04-22

## 2021-04-22 RX ORDER — ONDANSETRON 2 MG/ML
4 INJECTION INTRAMUSCULAR; INTRAVENOUS ONCE
Status: DISCONTINUED | OUTPATIENT
Start: 2021-04-22 | End: 2021-04-25 | Stop reason: HOSPADM

## 2021-04-22 RX ADMIN — ACETAMINOPHEN 650 MG: 325 TABLET, FILM COATED ORAL at 23:53

## 2021-04-23 ENCOUNTER — APPOINTMENT (EMERGENCY)
Dept: RADIOLOGY | Facility: HOSPITAL | Age: 61
DRG: 178 | End: 2021-04-23
Payer: COMMERCIAL

## 2021-04-23 PROBLEM — E87.6 HYPOKALEMIA: Status: ACTIVE | Noted: 2021-04-23

## 2021-04-23 PROBLEM — R79.89 ELEVATED TROPONIN: Status: ACTIVE | Noted: 2021-04-23

## 2021-04-23 PROBLEM — R19.7 DIARRHEA: Status: ACTIVE | Noted: 2021-04-23

## 2021-04-23 PROBLEM — R77.8 ELEVATED TROPONIN: Status: ACTIVE | Noted: 2021-04-23

## 2021-04-23 PROBLEM — U07.1 COVID-19 VIRUS INFECTION: Status: ACTIVE | Noted: 2021-04-23

## 2021-04-23 LAB
ABO GROUP BLD: NORMAL
ALBUMIN SERPL BCP-MCNC: 3.3 G/DL (ref 3.5–5)
ALBUMIN SERPL BCP-MCNC: 3.7 G/DL (ref 3.5–5)
ALP SERPL-CCNC: 47 U/L (ref 46–116)
ALP SERPL-CCNC: 55 U/L (ref 46–116)
ALT SERPL W P-5'-P-CCNC: 45 U/L (ref 12–78)
ALT SERPL W P-5'-P-CCNC: 53 U/L (ref 12–78)
ANION GAP SERPL CALCULATED.3IONS-SCNC: 10 MMOL/L (ref 4–13)
ANION GAP SERPL CALCULATED.3IONS-SCNC: 11 MMOL/L (ref 4–13)
APTT PPP: 32 SECONDS (ref 23–37)
AST SERPL W P-5'-P-CCNC: 43 U/L (ref 5–45)
AST SERPL W P-5'-P-CCNC: 47 U/L (ref 5–45)
ATRIAL RATE: 81 BPM
ATRIAL RATE: 85 BPM
BASOPHILS # BLD AUTO: 0.01 THOUSANDS/ΜL (ref 0–0.1)
BASOPHILS NFR BLD AUTO: 0 % (ref 0–1)
BILIRUB SERPL-MCNC: 0.37 MG/DL (ref 0.2–1)
BILIRUB SERPL-MCNC: 0.38 MG/DL (ref 0.2–1)
BUN SERPL-MCNC: 15 MG/DL (ref 5–25)
BUN SERPL-MCNC: 18 MG/DL (ref 5–25)
C DIFF TOX B TCDB STL QL NAA+PROBE: NEGATIVE
CALCIUM ALBUM COR SERPL-MCNC: 8.4 MG/DL (ref 8.3–10.1)
CALCIUM SERPL-MCNC: 7.8 MG/DL (ref 8.3–10.1)
CALCIUM SERPL-MCNC: 8.6 MG/DL (ref 8.3–10.1)
CHLORIDE SERPL-SCNC: 101 MMOL/L (ref 100–108)
CHLORIDE SERPL-SCNC: 103 MMOL/L (ref 100–108)
CHOLEST SERPL-MCNC: 92 MG/DL (ref 50–200)
CK MB SERPL-MCNC: 0.6 NG/ML (ref 0–5)
CK MB SERPL-MCNC: <1 % (ref 0–2.5)
CK SERPL-CCNC: 209 U/L (ref 26–192)
CO2 SERPL-SCNC: 23 MMOL/L (ref 21–32)
CO2 SERPL-SCNC: 24 MMOL/L (ref 21–32)
CREAT SERPL-MCNC: 0.92 MG/DL (ref 0.6–1.3)
CREAT SERPL-MCNC: 1.14 MG/DL (ref 0.6–1.3)
CRP SERPL QL: 6.2 MG/L
D DIMER PPP FEU-MCNC: 0.55 UG/ML FEU
EOSINOPHIL # BLD AUTO: 0 THOUSAND/ΜL (ref 0–0.61)
EOSINOPHIL NFR BLD AUTO: 0 % (ref 0–6)
ERYTHROCYTE [DISTWIDTH] IN BLOOD BY AUTOMATED COUNT: 13.4 % (ref 11.6–15.1)
EST. AVERAGE GLUCOSE BLD GHB EST-MCNC: 123 MG/DL
FERRITIN SERPL-MCNC: 550 NG/ML (ref 8–388)
GFR SERPL CREATININE-BSD FRML MDRD: 52 ML/MIN/1.73SQ M
GFR SERPL CREATININE-BSD FRML MDRD: 67 ML/MIN/1.73SQ M
GLUCOSE SERPL-MCNC: 104 MG/DL (ref 65–140)
GLUCOSE SERPL-MCNC: 111 MG/DL (ref 65–140)
HBA1C MFR BLD: 5.9 %
HCT VFR BLD AUTO: 42.7 % (ref 34.8–46.1)
HDLC SERPL-MCNC: 29 MG/DL
HGB BLD-MCNC: 14.2 G/DL (ref 11.5–15.4)
IMM GRANULOCYTES # BLD AUTO: 0.02 THOUSAND/UL (ref 0–0.2)
IMM GRANULOCYTES NFR BLD AUTO: 1 % (ref 0–2)
INR PPP: 0.91 (ref 0.84–1.19)
LACTATE SERPL-SCNC: 1.3 MMOL/L (ref 0.5–2)
LDLC SERPL CALC-MCNC: 50 MG/DL (ref 0–100)
LIPASE SERPL-CCNC: 777 U/L (ref 73–393)
LYMPHOCYTES # BLD AUTO: 1.18 THOUSANDS/ΜL (ref 0.6–4.47)
LYMPHOCYTES NFR BLD AUTO: 29 % (ref 14–44)
MCH RBC QN AUTO: 28.9 PG (ref 26.8–34.3)
MCHC RBC AUTO-ENTMCNC: 33.3 G/DL (ref 31.4–37.4)
MCV RBC AUTO: 87 FL (ref 82–98)
MONOCYTES # BLD AUTO: 0.29 THOUSAND/ΜL (ref 0.17–1.22)
MONOCYTES NFR BLD AUTO: 7 % (ref 4–12)
NEUTROPHILS # BLD AUTO: 2.6 THOUSANDS/ΜL (ref 1.85–7.62)
NEUTS SEG NFR BLD AUTO: 63 % (ref 43–75)
NRBC BLD AUTO-RTO: 0 /100 WBCS
NT-PROBNP SERPL-MCNC: 129 PG/ML
P AXIS: 61 DEGREES
P AXIS: 70 DEGREES
PLATELET # BLD AUTO: 166 THOUSANDS/UL (ref 149–390)
PLATELET # BLD AUTO: 175 THOUSANDS/UL (ref 149–390)
PMV BLD AUTO: 10.2 FL (ref 8.9–12.7)
PMV BLD AUTO: 10.3 FL (ref 8.9–12.7)
POTASSIUM SERPL-SCNC: 3.2 MMOL/L (ref 3.5–5.3)
POTASSIUM SERPL-SCNC: 3.5 MMOL/L (ref 3.5–5.3)
PR INTERVAL: 144 MS
PR INTERVAL: 146 MS
PROCALCITONIN SERPL-MCNC: <0.05 NG/ML
PROCALCITONIN SERPL-MCNC: <0.05 NG/ML
PROT SERPL-MCNC: 6.7 G/DL (ref 6.4–8.2)
PROT SERPL-MCNC: 7.9 G/DL (ref 6.4–8.2)
PROTHROMBIN TIME: 12.3 SECONDS (ref 11.6–14.5)
QRS AXIS: -17 DEGREES
QRS AXIS: -19 DEGREES
QRSD INTERVAL: 82 MS
QRSD INTERVAL: 82 MS
QT INTERVAL: 356 MS
QT INTERVAL: 376 MS
QTC INTERVAL: 423 MS
QTC INTERVAL: 436 MS
RBC # BLD AUTO: 4.91 MILLION/UL (ref 3.81–5.12)
RH BLD: POSITIVE
SARS-COV-2 RNA RESP QL NAA+PROBE: POSITIVE
SODIUM SERPL-SCNC: 135 MMOL/L (ref 136–145)
SODIUM SERPL-SCNC: 137 MMOL/L (ref 136–145)
T WAVE AXIS: 50 DEGREES
T WAVE AXIS: 57 DEGREES
TRIGL SERPL-MCNC: 66 MG/DL
TROPONIN I SERPL-MCNC: 0.03 NG/ML
TROPONIN I SERPL-MCNC: 0.05 NG/ML
TROPONIN I SERPL-MCNC: 0.05 NG/ML
VENTRICULAR RATE: 81 BPM
VENTRICULAR RATE: 85 BPM
WBC # BLD AUTO: 4.1 THOUSAND/UL (ref 4.31–10.16)

## 2021-04-23 PROCEDURE — 87493 C DIFF AMPLIFIED PROBE: CPT | Performed by: NURSE PRACTITIONER

## 2021-04-23 PROCEDURE — 80053 COMPREHEN METABOLIC PANEL: CPT | Performed by: NURSE PRACTITIONER

## 2021-04-23 PROCEDURE — 93005 ELECTROCARDIOGRAM TRACING: CPT

## 2021-04-23 PROCEDURE — 82728 ASSAY OF FERRITIN: CPT | Performed by: NURSE PRACTITIONER

## 2021-04-23 PROCEDURE — 84484 ASSAY OF TROPONIN QUANT: CPT | Performed by: NURSE PRACTITIONER

## 2021-04-23 PROCEDURE — 93010 ELECTROCARDIOGRAM REPORT: CPT | Performed by: INTERNAL MEDICINE

## 2021-04-23 PROCEDURE — 85025 COMPLETE CBC W/AUTO DIFF WBC: CPT | Performed by: NURSE PRACTITIONER

## 2021-04-23 PROCEDURE — 86901 BLOOD TYPING SEROLOGIC RH(D): CPT | Performed by: NURSE PRACTITIONER

## 2021-04-23 PROCEDURE — 85049 AUTOMATED PLATELET COUNT: CPT | Performed by: NURSE PRACTITIONER

## 2021-04-23 PROCEDURE — 87505 NFCT AGENT DETECTION GI: CPT | Performed by: NURSE PRACTITIONER

## 2021-04-23 PROCEDURE — 84145 PROCALCITONIN (PCT): CPT | Performed by: NURSE PRACTITIONER

## 2021-04-23 PROCEDURE — 71046 X-RAY EXAM CHEST 2 VIEWS: CPT

## 2021-04-23 PROCEDURE — 99223 1ST HOSP IP/OBS HIGH 75: CPT | Performed by: INTERNAL MEDICINE

## 2021-04-23 PROCEDURE — 96365 THER/PROPH/DIAG IV INF INIT: CPT

## 2021-04-23 PROCEDURE — 86900 BLOOD TYPING SEROLOGIC ABO: CPT | Performed by: NURSE PRACTITIONER

## 2021-04-23 PROCEDURE — XW033E5 INTRODUCTION OF REMDESIVIR ANTI-INFECTIVE INTO PERIPHERAL VEIN, PERCUTANEOUS APPROACH, NEW TECHNOLOGY GROUP 5: ICD-10-PCS | Performed by: INTERNAL MEDICINE

## 2021-04-23 PROCEDURE — 36415 COLL VENOUS BLD VENIPUNCTURE: CPT | Performed by: NURSE PRACTITIONER

## 2021-04-23 RX ORDER — LOPERAMIDE HYDROCHLORIDE 2 MG/1
2 CAPSULE ORAL 3 TIMES DAILY PRN
Status: DISCONTINUED | OUTPATIENT
Start: 2021-04-23 | End: 2021-04-25 | Stop reason: HOSPADM

## 2021-04-23 RX ORDER — ONDANSETRON 2 MG/ML
4 INJECTION INTRAMUSCULAR; INTRAVENOUS EVERY 6 HOURS PRN
Status: DISCONTINUED | OUTPATIENT
Start: 2021-04-23 | End: 2021-04-25 | Stop reason: HOSPADM

## 2021-04-23 RX ORDER — AMLODIPINE BESYLATE 2.5 MG/1
2.5 TABLET ORAL EVERY MORNING
Status: DISCONTINUED | OUTPATIENT
Start: 2021-04-23 | End: 2021-04-25 | Stop reason: HOSPADM

## 2021-04-23 RX ORDER — BENZONATATE 100 MG/1
100 CAPSULE ORAL 3 TIMES DAILY PRN
Status: DISCONTINUED | OUTPATIENT
Start: 2021-04-23 | End: 2021-04-25 | Stop reason: HOSPADM

## 2021-04-23 RX ORDER — GUAIFENESIN 600 MG
600 TABLET, EXTENDED RELEASE 12 HR ORAL EVERY 12 HOURS SCHEDULED
Status: DISCONTINUED | OUTPATIENT
Start: 2021-04-23 | End: 2021-04-25 | Stop reason: HOSPADM

## 2021-04-23 RX ORDER — ZINC SULFATE 50(220)MG
220 CAPSULE ORAL DAILY
Status: DISCONTINUED | OUTPATIENT
Start: 2021-04-23 | End: 2021-04-25 | Stop reason: HOSPADM

## 2021-04-23 RX ORDER — SODIUM CHLORIDE 9 MG/ML
100 INJECTION, SOLUTION INTRAVENOUS CONTINUOUS
Status: DISCONTINUED | OUTPATIENT
Start: 2021-04-23 | End: 2021-04-25 | Stop reason: HOSPADM

## 2021-04-23 RX ORDER — TRIAMTERENE AND HYDROCHLOROTHIAZIDE 37.5; 25 MG/1; MG/1
1 TABLET ORAL DAILY
Status: DISCONTINUED | OUTPATIENT
Start: 2021-04-23 | End: 2021-04-25 | Stop reason: HOSPADM

## 2021-04-23 RX ORDER — MELATONIN
2000 DAILY
Status: DISCONTINUED | OUTPATIENT
Start: 2021-04-23 | End: 2021-04-25 | Stop reason: HOSPADM

## 2021-04-23 RX ORDER — MULTIVITAMIN/IRON/FOLIC ACID 18MG-0.4MG
1 TABLET ORAL DAILY
Status: DISCONTINUED | OUTPATIENT
Start: 2021-04-30 | End: 2021-04-25 | Stop reason: HOSPADM

## 2021-04-23 RX ORDER — ACETAMINOPHEN 325 MG/1
650 TABLET ORAL EVERY 6 HOURS PRN
Status: DISCONTINUED | OUTPATIENT
Start: 2021-04-23 | End: 2021-04-25 | Stop reason: HOSPADM

## 2021-04-23 RX ORDER — POTASSIUM CHLORIDE 20 MEQ/1
40 TABLET, EXTENDED RELEASE ORAL ONCE
Status: COMPLETED | OUTPATIENT
Start: 2021-04-23 | End: 2021-04-23

## 2021-04-23 RX ORDER — METOPROLOL SUCCINATE 100 MG/1
100 TABLET, EXTENDED RELEASE ORAL EVERY MORNING
Status: DISCONTINUED | OUTPATIENT
Start: 2021-04-23 | End: 2021-04-25 | Stop reason: HOSPADM

## 2021-04-23 RX ORDER — ASCORBIC ACID 500 MG
1000 TABLET ORAL EVERY 12 HOURS SCHEDULED
Status: DISCONTINUED | OUTPATIENT
Start: 2021-04-23 | End: 2021-04-25 | Stop reason: HOSPADM

## 2021-04-23 RX ORDER — ASPIRIN 81 MG/1
81 TABLET ORAL DAILY
Status: DISCONTINUED | OUTPATIENT
Start: 2021-04-23 | End: 2021-04-25 | Stop reason: HOSPADM

## 2021-04-23 RX ADMIN — OXYCODONE HYDROCHLORIDE AND ACETAMINOPHEN 1000 MG: 500 TABLET ORAL at 02:47

## 2021-04-23 RX ADMIN — LOPERAMIDE HYDROCHLORIDE 2 MG: 2 CAPSULE ORAL at 16:24

## 2021-04-23 RX ADMIN — METOPROLOL SUCCINATE 100 MG: 100 TABLET, EXTENDED RELEASE ORAL at 09:18

## 2021-04-23 RX ADMIN — ACETAMINOPHEN 650 MG: 325 TABLET, FILM COATED ORAL at 16:23

## 2021-04-23 RX ADMIN — Medication 2000 UNITS: at 09:18

## 2021-04-23 RX ADMIN — AMLODIPINE BESYLATE 2.5 MG: 2.5 TABLET ORAL at 09:18

## 2021-04-23 RX ADMIN — ACETAMINOPHEN 650 MG: 325 TABLET, FILM COATED ORAL at 21:39

## 2021-04-23 RX ADMIN — GUAIFENESIN 600 MG: 600 TABLET, EXTENDED RELEASE ORAL at 21:39

## 2021-04-23 RX ADMIN — POTASSIUM CHLORIDE 40 MEQ: 1500 TABLET, EXTENDED RELEASE ORAL at 09:18

## 2021-04-23 RX ADMIN — ZINC SULFATE 220 MG (50 MG) CAPSULE 220 MG: CAPSULE at 09:18

## 2021-04-23 RX ADMIN — ENOXAPARIN SODIUM 40 MG: 40 INJECTION SUBCUTANEOUS at 09:19

## 2021-04-23 RX ADMIN — TRIAMTERENE AND HYDROCHLOROTHIAZIDE 1 TABLET: 37.5; 25 TABLET ORAL at 09:18

## 2021-04-23 RX ADMIN — ASPIRIN 81 MG: 81 TABLET, COATED ORAL at 09:18

## 2021-04-23 RX ADMIN — BENZONATATE 100 MG: 100 CAPSULE ORAL at 21:39

## 2021-04-23 RX ADMIN — OXYCODONE HYDROCHLORIDE AND ACETAMINOPHEN 1000 MG: 500 TABLET ORAL at 21:39

## 2021-04-23 RX ADMIN — OXYCODONE HYDROCHLORIDE AND ACETAMINOPHEN 1000 MG: 500 TABLET ORAL at 09:18

## 2021-04-23 RX ADMIN — SODIUM CHLORIDE 50 ML/HR: 0.9 INJECTION, SOLUTION INTRAVENOUS at 02:48

## 2021-04-23 RX ADMIN — ACETAMINOPHEN 650 MG: 325 TABLET, FILM COATED ORAL at 09:18

## 2021-04-23 RX ADMIN — BENZONATATE 100 MG: 100 CAPSULE ORAL at 16:23

## 2021-04-23 RX ADMIN — ENOXAPARIN SODIUM 40 MG: 40 INJECTION SUBCUTANEOUS at 21:39

## 2021-04-23 RX ADMIN — SODIUM CHLORIDE, SODIUM GLUCONATE, SODIUM ACETATE, POTASSIUM CHLORIDE, MAGNESIUM CHLORIDE, SODIUM PHOSPHATE, DIBASIC, AND POTASSIUM PHOSPHATE 1000 ML: .53; .5; .37; .037; .03; .012; .00082 INJECTION, SOLUTION INTRAVENOUS at 00:21

## 2021-04-23 RX ADMIN — REMDESIVIR 200 MG: 100 INJECTION, POWDER, LYOPHILIZED, FOR SOLUTION INTRAVENOUS at 03:38

## 2021-04-23 NOTE — PLAN OF CARE
Problem: Nutrition/Hydration-ADULT  Goal: Nutrient/Hydration intake appropriate for improving, restoring or maintaining nutritional needs  Description: Monitor and assess patient's nutrition/hydration status for malnutrition  Collaborate with interdisciplinary team and initiate plan and interventions as ordered  Monitor patient's weight and dietary intake as ordered or per policy  Utilize nutrition screening tool and intervene as necessary  Determine patient's food preferences and provide high-protein, high-caloric foods as appropriate       INTERVENTIONS:  - Monitor oral intake, urinary output, labs, and treatment plans  - Assess nutrition and hydration status and recommend course of action  - Evaluate amount of meals eaten  - Assist patient with eating if necessary   - Allow adequate time for meals  - Recommend/ encourage appropriate diets, oral nutritional supplements, and vitamin/mineral supplements  - Order, calculate, and assess calorie counts as needed  - Recommend, monitor, and adjust tube feedings and TPN/PPN based on assessed needs  - Assess need for intravenous fluids  - Provide specific nutrition/hydration education as appropriate  - Include patient/family/caregiver in decisions related to nutrition  Outcome: Progressing     Problem: PAIN - ADULT  Goal: Verbalizes/displays adequate comfort level or baseline comfort level  Description: Interventions:  - Encourage patient to monitor pain and request assistance  - Assess pain using appropriate pain scale  - Administer analgesics based on type and severity of pain and evaluate response  - Implement non-pharmacological measures as appropriate and evaluate response  - Consider cultural and social influences on pain and pain management  - Notify physician/advanced practitioner if interventions unsuccessful or patient reports new pain  Outcome: Progressing     Problem: INFECTION - ADULT  Goal: Absence or prevention of progression during hospitalization  Description: INTERVENTIONS:  - Assess and monitor for signs and symptoms of infection  - Monitor lab/diagnostic results  - Monitor all insertion sites, i e  indwelling lines, tubes, and drains  - Monitor endotracheal if appropriate and nasal secretions for changes in amount and color  - [place] appropriate cooling/warming therapies per order  - Administer medications as ordered  - Instruct and encourage patient and family to use good hand hygiene technique  - Identify and instruct in appropriate isolation precautions for identified infection/condition  Outcome: Progressing  Goal: Absence of fever/infection during neutropenic period  Description: INTERVENTIONS:  - Monitor WBC    Outcome: Progressing     Problem: SAFETY ADULT  Goal: Patient will remain free of falls  Description: INTERVENTIONS:  - Assess patient frequently for physical needs  -  Identify cognitive and physical deficits and behaviors that affect risk of falls    -  [place] fall precautions as indicated by assessment   - Educate patient/family on patient safety including physical limitations  - Instruct patient to call for assistance with activity based on assessment  - Modify environment to reduce risk of injury  - Consider OT/PT consult to assist with strengthening/mobility  Outcome: Progressing  Goal: Maintain or return to baseline ADL function  Description: INTERVENTIONS:  -  Assess patient's ability to carry out ADLs; assess patient's baseline for ADL function and identify physical deficits which impact ability to perform ADLs (bathing, care of mouth/teeth, toileting, grooming, dressing, etc )  - Assess/evaluate cause of self-care deficits   - Assess range of motion  - Assess patient's mobility; develop plan if impaired  - Assess patient's need for assistive devices and provide as appropriate  - Encourage maximum independence but intervene and supervise when necessary  - Involve family in performance of ADLs  - Assess for home care needs following discharge   - Consider OT consult to assist with ADL evaluation and planning for discharge  - Provide patient education as appropriate  Outcome: Progressing  Goal: Maintain or return mobility status to optimal level  Description: INTERVENTIONS:  - Assess patient's baseline mobility status (ambulation, transfers, stairs, etc )    - Identify cognitive and physical deficits and behaviors that affect mobility  - Identify mobility aids required to assist with transfers and/or ambulation (gait belt, sit-to-stand, lift, walker, cane, etc )  - [place] fall precautions as indicated by assessment  - Record patient progress and toleration of activity level on Mobility SBAR; progress patient to next Phase/Stage  - Instruct patient to call for assistance with activity based on assessment  - Consider rehabilitation consult to assist with strengthening/weightbearing, etc   Outcome: Progressing     Problem: DISCHARGE PLANNING  Goal: Discharge to home or other facility with appropriate resources  Description: INTERVENTIONS:  - Identify barriers to discharge w/patient and caregiver  - Arrange for needed discharge resources and transportation as appropriate  - Identify discharge learning needs (meds, wound care, etc )  - Arrange for interpretive services to assist at discharge as needed  - Refer to Case Management Department for coordinating discharge planning if the patient needs post-hospital services based on physician/advanced practitioner order or complex needs related to functional status, cognitive ability, or social support system  Outcome: Progressing     Problem: Knowledge Deficit  Goal: Patient/family/caregiver demonstrates understanding of disease process, treatment plan, medications, and discharge instructions  Description: Complete learning assessment and assess knowledge base    Interventions:  - Provide teaching at level of understanding  - Provide teaching via preferred learning methods  Outcome: Progressing

## 2021-04-23 NOTE — ED NOTES
Patient transported to [facility] [address], [facility]  04/23/21 0447

## 2021-04-23 NOTE — ASSESSMENT & PLAN NOTE
 Blood pressure is reviewed and acceptable   o Last recorded pressure: 122/62   Continue amlodipine, metoprolol succinate, triamterene and HCTZ  [facility] Monitor blood pressure

## 2021-04-23 NOTE — ASSESSMENT & PLAN NOTE
· Presentation: Patient presented to the ED with complaints of continued diarrhea for the past 5 days  Patient reports that she noticed 7 days ago that she felt extremely fatigued and "run down"  She states that she has had a dry cough, chills and intermittent headache over the past week  She denies chest pain, chest tightness, fever, loss of appetite, or shortness of breath  She reports that her grandson is currently sick with COVID-19  Patient denies receiving any doses of the COVID-19 vaccine; she reports that she was supposed to get her first vaccine dose on Sunday, 04/25/2021  Upon evaluation in the ED, she was diagnosed with COVID-19  · Will treat according to mild pathway  · CXR: Pending  · Check inflammatory markers: CRP, D-dimer, ferritin, procalcitonin  · Will start Remdesivir 200 mg IV day #1, then 100 mg IV daily x4 days  · Patient educated on risks and benefits on this now FDA approved medication  · Patient not requiring any supplemental oxygen  Patient not started on dexamethasone  · Check cardiac markers: troponin 0 05, CK, BNP  · Follow CBC and CMP daily  · Patient not started on antibiotics at this time  · Start Vitamin D3, Vitamin C, Zinc, MV   · Self-proning  · Monitor respiratory status, supplemental nasal cannula oxygen as needed  · Current O2 requirements upon admission: Patient is currently 93-95% on room air

## 2021-04-23 NOTE — PLAN OF CARE
Problem: Nutrition/Hydration-ADULT  Goal: Nutrient/Hydration intake appropriate for improving, restoring or maintaining nutritional needs  Description: Monitor and assess patient's nutrition/hydration status for malnutrition  Collaborate with interdisciplinary team and initiate plan and interventions as ordered  Monitor patient's weight and dietary intake as ordered or per policy  Utilize nutrition screening tool and intervene as necessary  Determine patient's food preferences and provide high-protein, high-caloric foods as appropriate       INTERVENTIONS:  - Monitor oral intake, urinary output, labs, and treatment plans  - Assess nutrition and hydration status and recommend course of action  - Evaluate amount of meals eaten  - Assist patient with eating if necessary   - Allow adequate time for meals  - Recommend/ encourage appropriate diets, oral nutritional supplements, and vitamin/mineral supplements  - Order, calculate, and assess calorie counts as needed  - Recommend, monitor, and adjust tube feedings and TPN/PPN based on assessed needs  - Assess need for intravenous fluids  - Provide specific nutrition/hydration education as appropriate  - Include patient/family/caregiver in decisions related to nutrition  Outcome: Progressing     Problem: PAIN - ADULT  Goal: Verbalizes/displays adequate comfort level or baseline comfort level  Description: Interventions:  - Encourage patient to monitor pain and request assistance  - Assess pain using appropriate pain scale  - Administer analgesics based on type and severity of pain and evaluate response  - Implement non-pharmacological measures as appropriate and evaluate response  - Consider cultural and social influences on pain and pain management  - Notify physician/advanced practitioner if interventions unsuccessful or patient reports new pain  Outcome: Progressing     Problem: INFECTION - ADULT  Goal: Absence or prevention of progression during hospitalization  Description: INTERVENTIONS:  - Assess and monitor for signs and symptoms of infection  - Monitor lab/diagnostic results  - Monitor all insertion sites, i e  indwelling lines, tubes, and drains  - Monitor endotracheal if appropriate and nasal secretions for changes in amount and color  - [place] appropriate cooling/warming therapies per order  - Administer medications as ordered  - Instruct and encourage patient and family to use good hand hygiene technique  - Identify and instruct in appropriate isolation precautions for identified infection/condition  Outcome: Progressing  Goal: Absence of fever/infection during neutropenic period  Description: INTERVENTIONS:  - Monitor WBC    Outcome: Progressing     Problem: SAFETY ADULT  Goal: Patient will remain free of falls  Description: INTERVENTIONS:  - Assess patient frequently for physical needs  -  Identify cognitive and physical deficits and behaviors that affect risk of falls    -  [place] fall precautions as indicated by assessment   - Educate patient/family on patient safety including physical limitations  - Instruct patient to call for assistance with activity based on assessment  - Modify environment to reduce risk of injury  - Consider OT/PT consult to assist with strengthening/mobility  Outcome: Progressing  Goal: Maintain or return to baseline ADL function  Description: INTERVENTIONS:  -  Assess patient's ability to carry out ADLs; assess patient's baseline for ADL function and identify physical deficits which impact ability to perform ADLs (bathing, care of mouth/teeth, toileting, grooming, dressing, etc )  - Assess/evaluate cause of self-care deficits   - Assess range of motion  - Assess patient's mobility; develop plan if impaired  - Assess patient's need for assistive devices and provide as appropriate  - Encourage maximum independence but intervene and supervise when necessary  - Involve family in performance of ADLs  - Assess for home care needs following discharge   - Consider OT consult to assist with ADL evaluation and planning for discharge  - Provide patient education as appropriate  Outcome: Progressing  Goal: Maintain or return mobility status to optimal level  Description: INTERVENTIONS:  - Assess patient's baseline mobility status (ambulation, transfers, stairs, etc )    - Identify cognitive and physical deficits and behaviors that affect mobility  - Identify mobility aids required to assist with transfers and/or ambulation (gait belt, sit-to-stand, lift, walker, cane, etc )  - [place] fall precautions as indicated by assessment  - Record patient progress and toleration of activity level on Mobility SBAR; progress patient to next Phase/Stage  - Instruct patient to call for assistance with activity based on assessment  - Consider rehabilitation consult to assist with strengthening/weightbearing, etc   Outcome: Progressing     Problem: DISCHARGE PLANNING  Goal: Discharge to home or other facility with appropriate resources  Description: INTERVENTIONS:  - Identify barriers to discharge w/patient and caregiver  - Arrange for needed discharge resources and transportation as appropriate  - Identify discharge learning needs (meds, wound care, etc )  - Arrange for interpretive services to assist at discharge as needed  - Refer to Case Management Department for coordinating discharge planning if the patient needs post-hospital services based on physician/advanced practitioner order or complex needs related to functional status, cognitive ability, or social support system  Outcome: Progressing     Problem: Knowledge Deficit  Goal: Patient/family/caregiver demonstrates understanding of disease process, treatment plan, medications, and discharge instructions  Description: Complete learning assessment and assess knowledge base    Interventions:  - Provide teaching at level of understanding  - Provide teaching via preferred learning methods  Outcome: Progressing

## 2021-04-23 NOTE — H&P
Attestation note to the H&P/admit notes of our nurse practitioner done today, April 23, 2021:    I personally saw and examined the patient  I reviewed patient's lab results, vital signs, diagnostic test results, medications and orders  I reviewed the H&P/admit notes of our nurse practitioner, Lotus Almaguer, done today, April 23, 2021 and I agree with her notes  When I saw the patient, patient still having diarrhea, but no nausea or vomiting  Patient also denies any abdominal pains or any shortness of breath  Patient still having cough  I agree with examination below  I agree with the assessments and plans enumerated below  We will follow our COVID-19 treatment algorithm/protocol as far as management is concerned  I added cough medicine/antitussive  With C difficile PCR negative, I ordered for Imodium on as needed basis, as patient continues to have diarrhea  Continue with IV fluids  Patient's troponins were just mildly elevated and flat and went back to normal levels (peak was 0 05 and went back to 0 03); patient denies any chest pains  Thus I agree with the notes of our nurse practitioner that this is likely non MI troponin elevation secondary to COVID-19 infection  I discussed with the patient our findings, management and plans  Answered questions and concerns  She is okay with the management and plans  I spoke to the patient's daughter, who is a nurse at [facility] critical care unit and discussed with her our findings, management and plans  I also answered her questions and concerns  She is okay with the management and plans

## 2021-04-23 NOTE — ASSESSMENT & PLAN NOTE
· Presentation: Patient presents with complaints of nonbloody diarrhea for the past 5 days  Patient she eats something "it immediately runs through her"  · Suspect 2/2 COVID-19  · Obtain stool enteric bacterial panel and C diff samples to complete workup  · IV hydration  · Monitor lytes

## 2021-04-23 NOTE — ED NOTES
Pt aware of needing to provide urine sample   Will continue to monitor     Armand Miller RN  04/23/21 [id number]

## 2021-04-23 NOTE — H&P
Sawyer Riley 33 1960, 64 y o  female MRN: [id number]  Unit/Bed#: ED 27 Encounter: [id number]  Primary Care Provider: Tani Muhammad MD   Date and time admitted to hospital: 4/22/2021 10:57 PM    * Diarrhea  Assessment & Plan  · Presentation: Patient presents with complaints of nonbloody diarrhea for the past 5 days  Patient she eats something "it immediately runs through her"  · Suspect 2/2 COVID-19  · Obtain stool enteric bacterial panel and C diff samples to complete workup  · IV hydration  · Monitor lytes  COVID-19 virus infection  Assessment & Plan  · Presentation: Patient presented to the ED with complaints of continued diarrhea for the past 5 days  Patient reports that she noticed 7 days ago that she felt extremely fatigued and "run down"  She states that she has had a dry cough, chills and intermittent headache over the past week  She denies chest pain, chest tightness, fever, loss of appetite, or shortness of breath  She reports that her grandson is currently sick with COVID-19  Patient denies receiving any doses of the COVID-19 vaccine; she reports that she was supposed to get her first vaccine dose on Sunday, 04/25/2021  Upon evaluation in the ED, she was diagnosed with COVID-19  · Will treat according to mild pathway  · CXR: Pending  · Check inflammatory markers: CRP, D-dimer, ferritin, procalcitonin  · Will start Remdesivir 200 mg IV day #1, then 100 mg IV daily x4 days  · Patient educated on risks and benefits on this now FDA approved medication  · Patient not requiring any supplemental oxygen  Patient not started on dexamethasone  · Check cardiac markers: troponin 0 05, CK, BNP  · Follow CBC and CMP daily  · Patient not started on antibiotics at this time  · Start Vitamin D3, Vitamin C, Zinc, MV   · Self-proning  · Monitor respiratory status, supplemental nasal cannula oxygen as needed    · Current O2 requirements upon admission: Patient is currently 93-95% on room air  Elevated troponin  Assessment & Plan  · Patient denies chest pain  · Present on admission, troponin of 0 05  · Trend x3 or until peak  · Suspect 2/2 COVID-19  · EKG displayed normal sinus rhythm, heart rate 85, no ischemic changes noted  · Telemetry monitoring  · Obtain lipid and A1C  Essential hypertension  Assessment & Plan   Blood pressure is reviewed and acceptable   o Last recorded pressure: 122/62   Continue amlodipine, metoprolol succinate, triamterene and HCTZ  Valere  Monitor blood pressure  Class 3 severe obesity due to excess calories without serious comorbidity with body mass index (BMI) of 45 0 to 49 9 in adult [facility])  Assessment & Plan  · Encourage lifestyle modifications  VTE Prophylaxis: Enoxaparin (Lovenox)  / sequential compression device   Code Status: Full  POLST: POLST form is not discussed and not completed at this time  Anticipated Length of Stay:  Patient will be admitted on an Observation basis with an anticipated length of stay of  < 2 midnights  Justification for Hospital Stay: Elevated troponin    Total Time for Visit, including Counseling / Coordination of Care: 45 minutes  Greater than 50% of this total time spent on direct patient counseling and coordination of care  Chief Complaint:   Diarrhea    History of Present Illness:    Devra Goldmann is a 64 y o  female with a past medical history of obesity, HTN, and GERD who presents with diarrhea  Patient presented to the ED with complaints of continued non-bloody diarrhea for the past 5 days  Patient reports that she noticed 7 days ago that she felt extremely fatigued and "run down"  She states that she has had a dry cough, chills and intermittent headache over the past week  She denies chest pain, chest tightness, fever, loss of appetite, or shortness of breath  She reports that her grandson is currently sick with COVID-19   Patient denies receiving any doses of the COVID-19 vaccine; she reports that she was supposed to get her first vaccine dose on Sunday, 04/25/2021  Upon evaluation in the ED, she was diagnosed with COVID-19  During her ED workup, the patient's troponin level came back mildly elevated at 0 05  Patient will be admitted for observation including serial troponin trending, telemetry monitoring and mild treatment pathway for COVID-19 infection  Review of Systems:    Review of Systems   Constitutional: Positive for activity change, chills and fatigue  Negative for fever  HENT: Negative for congestion  Respiratory: Positive for cough  Negative for chest tightness and shortness of breath  Cardiovascular: Negative for chest pain and palpitations  Gastrointestinal: Positive for diarrhea  Negative for abdominal pain, constipation, nausea and vomiting  Musculoskeletal: Negative for arthralgias and myalgias  Neurological: Positive for headaches  All other systems reviewed and are negative  Past Medical and Surgical History:     Past Medical History:   Diagnosis Date    GERD (gastroesophageal reflux disease)     No medications    History of cervical dysplasia     History cryosurgery    Hypertension     Mitral regurgitation     Obesity     Postmenopausal bleeding     Rash     On left upper thigh and under abdominal fold- is seen by dermatologist    Stress incontinence     Thrombophlebitis Early 1990's    Superficial, right calf, from birth control pill    Wears glasses        Past Surgical History:   Procedure Laterality Date    OK HYSTEROSCOPY,W/ENDO BX N/A 7/24/2018    Procedure: DILATATION AND CURETTAGE (D&C) WITH HYSTEROSCOPY;  Surgeon: Windy Retana DO;  Location: [facility];  Service: Gynecology    WISDOM TOOTH EXTRACTION         Meds/Allergies:    Prior to Admission medications    Medication Sig Start Date End Date Taking?  Authorizing Provider   amLODIPine (NORVASC) 2 5 mg tablet Take 1 tablet (2 5 mg total) by mouth every morning 21   Darnell Cuevas MD   aspirin (ECOTRIN LOW STRENGTH) 81 mg EC tablet Take 1 tablet (81 mg total) by mouth 2 (two) times a day For DVT ppx  Patient taking differently: Take 81 mg by mouth daily For DVT ppx 20   Lyudmila Moore MD   Cholecalciferol (VITAMIN D) 2000 units CAPS Take 1 tablet by mouth daily    Historical Provider, MD   ibuprofen (MOTRIN) 200 mg tablet Take 400 mg by mouth every 6 (six) hours as needed for mild pain    Historical Provider, MD   metoprolol succinate (TOPROL-XL) 100 mg 24 hr tablet Take 1 tablet (100 mg total) by mouth every morning 21   Darnell Cuevas MD   triamterene-hydrochlorothiazide (MAXZIDE-25) 37 5-25 mg per tablet Take 1 tablet by mouth daily 21   Darnell Cuevas MD     I have reviewed home medications with patient personally  Allergies: No Known Allergies    Social History:     Marital Status: /Civil Union   Occupation: Unknown  Patient Pre-hospital Living Situation: Private residence  Patient Pre-hospital Level of Mobility: Indepedent  Patient Pre-hospital Diet Restrictions: None  Substance Use History:   Social History     Substance and Sexual Activity   Alcohol Use Yes    Comment: very rarely     Social History     Tobacco Use   Smoking Status Former Smoker    Packs/day: 0 75    Years: 20 00    Pack years: 15 00    Types: Cigarettes    Quit date:     Years since quittin 3   Smokeless Tobacco Never Used   Tobacco Comment    1 ppd x15 years      Social History     Substance and Sexual Activity   Drug Use No       Family History:    non-contributory    Physical Exam:     Vitals:   Blood Pressure: 122/62 (21 0100)  Pulse: 76 (21 0100)  Temperature: 97 7 °F (36 5 °C) (21 2251)  Respirations: 18 (21 0100)  SpO2: 93 % (21 0100)    Physical Exam  Vitals signs and nursing note reviewed  Constitutional:       General: She is not in acute distress  Appearance: She is obese   She is not ill-appearing  HENT:      Head: Normocephalic and atraumatic  Nose: Nose normal    Eyes:      General: No scleral icterus  Conjunctiva/sclera: Conjunctivae normal    Neck:      Musculoskeletal: Normal range of motion  Cardiovascular:      Rate and Rhythm: Normal rate and regular rhythm  Pulses: Normal pulses  Heart sounds: Normal heart sounds  No murmur  Pulmonary:      Effort: Pulmonary effort is normal  No respiratory distress  Breath sounds: Normal breath sounds  No wheezing, rhonchi or rales  Chest:      Chest wall: No tenderness  Abdominal:      General: Bowel sounds are normal  There is no distension  Palpations: Abdomen is soft  Tenderness: There is no abdominal tenderness  Musculoskeletal:         General: No swelling or deformity  Skin:     General: Skin is warm and dry  Capillary Refill: Capillary refill takes 2 to 3 seconds  Neurological:      Mental Status: She is alert and oriented to person, place, and time  Psychiatric:         Speech: Speech normal              Additional Data:     Lab Results: I have personally reviewed pertinent reports  Results from last 7 days   Lab Units 04/22/21  2336   WBC Thousand/uL 4 94   HEMOGLOBIN g/dL 15 7*   HEMATOCRIT % 47 6*   PLATELETS Thousands/uL 198   NEUTROS PCT % 66   LYMPHS PCT % 26   MONOS PCT % 8   EOS PCT % 0     Results from last 7 days   Lab Units 04/22/21  2336   SODIUM mmol/L 135*   POTASSIUM mmol/L 3 5   CHLORIDE mmol/L 101   CO2 mmol/L 23   BUN mg/dL 18   CREATININE mg/dL 1 14   ANION GAP mmol/L 11   CALCIUM mg/dL 8 6   ALBUMIN g/dL 3 7   TOTAL BILIRUBIN mg/dL 0 37   ALK PHOS U/L 55   ALT U/L 53   AST U/L 47*   GLUCOSE RANDOM mg/dL 111     Results from last 7 days   Lab Units 04/22/21  2353   INR  0 91             Results from last 7 days   Lab Units 04/22/21  2353   LACTIC ACID mmol/L 1 3       Imaging: I have personally reviewed pertinent reports        XR chest 2 views    (Results Pending) EKG, Pathology, and Other Studies Reviewed on Admission:   · EKG: Normal sinus rhythm, heart rate 85  Allscripts / Epic Records Reviewed: Yes     ** Please Note: This note has been constructed using a voice recognition system   **

## 2021-04-23 NOTE — ED PROVIDER NOTES
History  Chief Complaint   Patient presents with    Diarrhea     pt is covid + and has had diahrrea for a week and now feels very weak     HPI     Patient is a pleasant 57-year-old female that reports to the emergency department with fatigue  She also notes diarrhea for the past week  She notes increased weakness  Patient is COVID positive  Patient denies any chest pain/chest pressure  No focal neurological symptoms  She simply reports being overall very fatigued and weak  About 5-6 episodes of diarrhea, watery per day  No bleeding  One episode of vomiting  Some slight cough  On exam patient is fatigued but in NAD  MDM 64 yof, will admit for elevated troponin and covid  Prior to Admission Medications   Prescriptions Last Dose Informant Patient Reported? Taking?    Cholecalciferol (VITAMIN D) 2000 units CAPS  Self Yes No   Sig: Take 1 tablet by mouth daily   amLODIPine (NORVASC) 2 5 mg tablet  Self No No   Sig: Take 1 tablet (2 5 mg total) by mouth every morning   aspirin (ECOTRIN LOW STRENGTH) 81 mg EC tablet  Self No No   Sig: Take 1 tablet (81 mg total) by mouth 2 (two) times a day For DVT ppx   Patient taking differently: Take 81 mg by mouth daily For DVT ppx   ibuprofen (MOTRIN) 200 mg tablet  Self Yes No   Sig: Take 400 mg by mouth every 6 (six) hours as needed for mild pain   metoprolol succinate (TOPROL-XL) 100 mg 24 hr tablet   No No   Sig: Take 1 tablet (100 mg total) by mouth every morning   triamterene-hydrochlorothiazide (MAXZIDE-25) 37 5-25 mg per tablet   No No   Sig: Take 1 tablet by mouth daily      Facility-Administered Medications: None       Past Medical History:   Diagnosis Date    GERD (gastroesophageal reflux disease)     No medications    History of cervical dysplasia     History cryosurgery    Hypertension     Mitral regurgitation     Obesity     Postmenopausal bleeding     Rash     On left upper thigh and under abdominal fold- is seen by dermatologist  Stress incontinence     Thrombophlebitis Early     Superficial, right calf, from birth control pill    Wears glasses        Past Surgical History:   Procedure Laterality Date    NM HYSTEROSCOPY,W/ENDO BX N/A 2018    Procedure: DILATATION AND CURETTAGE (D&C) WITH HYSTEROSCOPY;  Surgeon: Moni Perez DO;  Location: [facility] OR;  Service: Gynecology    WISDOM TOOTH EXTRACTION         Family History   Problem Relation Age of Onset    No Known Problems Mother     No Known Problems Father     Coronary artery disease Sister         started with CAD in her 42's   Rossy Courser No Known Problems Daughter     No Known Problems Maternal Grandmother     No Known Problems Maternal Grandfather     No Known Problems Paternal Grandmother     No Known Problems Paternal Grandfather     No Known Problems Daughter     No Known Problems Paternal Aunt     No Known Problems Paternal Aunt      I have reviewed and agree with the history as documented  E-Cigarette/Vaping     E-Cigarette/Vaping Substances     Social History     Tobacco Use    Smoking status: Former Smoker     Packs/day: 0 75     Years: 20 00     Pack years: 15 00     Types: Cigarettes     Quit date:      Years since quittin 3    Smokeless tobacco: Never Used    Tobacco comment: 1 ppd x15 years    Substance Use Topics    Alcohol use: Yes     Comment: very rarely    Drug use: No       Review of Systems   Constitutional: Positive for fatigue  Gastrointestinal: Positive for diarrhea, nausea and vomiting  All other systems reviewed and are negative  Physical Exam  Physical Exam  Vitals signs and nursing note reviewed  Constitutional:       Appearance: She is well-developed  HENT:      Head: Normocephalic and atraumatic  Right Ear: External ear normal       Left Ear: External ear normal    Eyes:      Conjunctiva/sclera: Conjunctivae normal    Neck:      Musculoskeletal: Normal range of motion and neck supple        Vascular: No JVD  Trachea: No tracheal deviation  Cardiovascular:      Rate and Rhythm: Normal rate and regular rhythm  Heart sounds: Normal heart sounds  Pulmonary:      Effort: Pulmonary effort is normal  No respiratory distress  Breath sounds: No wheezing or rales  Abdominal:      Palpations: Abdomen is soft  Tenderness: There is no abdominal tenderness  There is no guarding or rebound  Musculoskeletal:         General: No tenderness  Skin:     General: Skin is warm and dry  Findings: No erythema or rash  Neurological:      General: No focal deficit present  Mental Status: She is alert and oriented to person, place, and time  Motor: No weakness  Psychiatric:         Behavior: Behavior normal          Thought Content:  Thought content normal          Vital Signs  ED Triage Vitals   Temperature Pulse Respirations Blood Pressure SpO2   04/22/21 2251 04/22/21 2251 04/22/21 2251 04/22/21 2253 04/22/21 2251   97 7 °F (36 5 °C) 96 18 153/72 95 %      Temp src Heart Rate Source Patient Position - Orthostatic VS BP Location FiO2 (%)   -- 04/22/21 2251 04/22/21 2251 04/22/21 2251 --    Monitor Sitting Left arm       Pain Score       04/22/21 2251       No Pain           Vitals:    04/22/21 2251 04/22/21 2253 04/23/21 0000 04/23/21 0100   BP:  153/72 137/73 122/62   Pulse: 96  86 76   Patient Position - Orthostatic VS: Sitting  Lying Lying         Visual Acuity      ED Medications  Medications   ondansetron (ZOFRAN) injection 4 mg (4 mg Intravenous Not Given 4/22/21 5778)   cholecalciferol (VITAMIN D3) tablet 2,000 Units (has no administration in time range)   ascorbic acid (VITAMIN C) tablet 1,000 mg (has no administration in time range)   zinc sulfate (ZINCATE) capsule 220 mg (has no administration in time range)     Followed by   multivitamin-minerals (CENTRUM ADULTS) tablet 1 tablet (has no administration in time range)   sodium chloride 0 9 % infusion (has no administration in time range)   remdesivir (Veklury) 200 mg in sodium chloride 0 9 % 250 mL IVPB (has no administration in time range)     Followed by   remdesivir Irine Precise) 100 mg in sodium chloride 0 9 % 250 mL IVPB (has no administration in time range)   amLODIPine (NORVASC) tablet 2 5 mg (has no administration in time range)   aspirin (ECOTRIN LOW STRENGTH) EC tablet 81 mg (has no administration in time range)   metoprolol succinate (TOPROL-XL) 24 hr tablet 100 mg (has no administration in time range)   triamterene-hydrochlorothiazide (MAXZIDE-25) 37 5-25 mg per tablet 1 tablet (has no administration in time range)   acetaminophen (TYLENOL) tablet 650 mg (has no administration in time range)   enoxaparin (LOVENOX) subcutaneous injection 40 mg (has no administration in time range)   ondansetron (ZOFRAN) injection 4 mg (has no administration in time range)   multi-electrolyte (ISOLYTE-S PH 7 4) bolus 1,000 mL (1,000 mL Intravenous New Bag 4/23/21 0021)   acetaminophen (TYLENOL) tablet 650 mg (650 mg Oral Given 4/22/21 8492)       Diagnostic Studies  Results Reviewed     Procedure Component Value Units Date/Time    Platelet count [[id number]]     Lab Status: No result Specimen: Blood     Hemoglobin A1C w/ EAG Estimation [[id number]]     Lab Status: No result Specimen: Blood     Lipid Panel with Direct LDL reflex [[id number]]     Lab Status: No result Specimen: Blood     Procalcitonin with AM Reflex [[id number]]     Lab Status: No result Specimen: Blood     C-reactive protein [[id number]]     Lab Status: No result Specimen: Blood     Ferritin [[id number]]     Lab Status: No result Specimen: Blood     D-dimer, quantitative [[id number]]     Lab Status: No result Specimen: Blood     NT-BNP PRO [[id number]]     Lab Status: No result Specimen: Blood     CK (with reflex to MB) [[id number]]     Lab Status: No result Specimen: Blood     Troponin I [[id number]]     Lab Status: No result Specimen: Blood     Stool Enteric Bacterial Panel by PCR [[id number]] Lab Status: No result Specimen: Stool     Clostridium difficile toxin by PCR with EIA [[id number]]     Lab Status: No result Specimen: Stool     Protime-INR [[id number]]  (Normal) Collected: 04/22/21 2353    Lab Status: Final result Specimen: Blood from Arm, Right Updated: 04/23/21 0110     Protime 12 3 seconds      INR 0 91    APTT [[id number]]  (Normal) Collected: 04/22/21 2353    Lab Status: Final result Specimen: Blood from Arm, Right Updated: 04/23/21 0110     PTT 32 seconds     Novel Coronavirus (Covid-19),PCR [facility] - 2 Hour Stat [[id number]]  (Abnormal) Collected: 04/22/21 2353    Lab Status: Final result Specimen: Nares from Nose Updated: 04/23/21 0040     SARS-CoV-2 Positive    Narrative: The specimen collection materials, transport medium, and/or testing methodology utilized in the production of these test results have been proven to be reliable in a limited validation with an abbreviated program under the Emergency Utilization Authorization provided by the FDA  Testing reported as "Presumptive positive" will be confirmed with secondary testing to ensure result accuracy  Clinical caution and judgement should be used with the interpretation of these results with consideration of the clinical impression and other laboratory testing  Testing reported as "Positive" or "Negative" has been proven to be accurate according to standard laboratory validation requirements  All testing is performed with control materials showing appropriate reactivity at standard intervals  Lactic acid [[id number]]  (Normal) Collected: 04/22/21 2353    Lab Status: Final result Specimen: Blood from Arm, Right Updated: 04/23/21 0021     LACTIC ACID 1 3 mmol/L     Narrative:      Result may be elevated if tourniquet was used during collection      Troponin I [[id number]]  (Abnormal) Collected: 04/22/21 2353    Lab Status: Final result Specimen: Blood from Arm, Right Updated: 04/23/21 0019     Troponin I 0 05 ng/mL     Lipase [[id number]]  (Abnormal) Collected: 04/22/21 2336    Lab Status: Final result Specimen: Blood from Arm, Right Updated: 04/23/21 0001     Lipase 777 u/L     Comprehensive metabolic panel [[id number]]  (Abnormal) Collected: 04/22/21 2336    Lab Status: Final result Specimen: Blood from Arm, Right Updated: 04/23/21 0001     Sodium 135 mmol/L      Potassium 3 5 mmol/L      Chloride 101 mmol/L      CO2 23 mmol/L      ANION GAP 11 mmol/L      BUN 18 mg/dL      Creatinine 1 14 mg/dL      Glucose 111 mg/dL      Calcium 8 6 mg/dL      AST 47 U/L      ALT 53 U/L      Alkaline Phosphatase 55 U/L      Total Protein 7 9 g/dL      Albumin 3 7 g/dL      Total Bilirubin 0 37 mg/dL      eGFR 52 ml/min/1 73sq m     Narrative:      [place] guidelines for Chronic Kidney Disease (CKD):     Stage 1 with normal or high GFR (GFR > 90 mL/min/1 73 square meters)    Stage 2 Mild CKD (GFR = 60-89 mL/min/1 73 square meters)    Stage 3A Moderate CKD (GFR = 45-59 mL/min/1 73 square meters)    Stage 3B Moderate CKD (GFR = 30-44 mL/min/1 73 square meters)    Stage 4 Severe CKD (GFR = 15-29 mL/min/1 73 square meters)    Stage 5 End Stage CKD (GFR <15 mL/min/1 73 square meters)  Note: GFR calculation is accurate only with a steady state creatinine    CBC and differential [[id number]]  (Abnormal) Collected: 04/22/21 2336    Lab Status: Final result Specimen: Blood from Arm, Right Updated: 04/22/21 2344     WBC 4 94 Thousand/uL      RBC 5 49 Million/uL      Hemoglobin 15 7 g/dL      Hematocrit 47 6 %      MCV 87 fL      MCH 28 6 pg      MCHC 33 0 g/dL      RDW 13 2 %      MPV 10 4 fL      Platelets 611 Thousands/uL      nRBC 0 /100 WBCs      Neutrophils Relative 66 %      Immat GRANS % 0 %      Lymphocytes Relative 26 %      Monocytes Relative 8 %      Eosinophils Relative 0 %      Basophils Relative 0 %      Neutrophils Absolute 3 28 Thousands/µL      Immature Grans Absolute 0 01 Thousand/uL      Lymphocytes Absolute 1 27 Thousands/µL      Monocytes Absolute 0 37 Thousand/µL      Eosinophils Absolute 0 00 Thousand/µL      Basophils Absolute 0 01 Thousands/µL     UA w Reflex to Microscopic w Reflex to Culture [[id number]]     Lab Status: No result Specimen: Urine                  XR chest 2 views    (Results Pending)              Procedures  Procedures         ED Course                             SBIRT 20yo+      Most Recent Value   SBIRT (24 yo +)   In order to provide better care to our patients, we are screening all of our patients for alcohol and drug use  Would it be okay to ask you these screening questions? Unable to answer at this time Filed at: 04/22/2021 2258                    MDM    Disposition  Final diagnoses:   Elevated troponin   COVID-19     Time reflects when diagnosis was documented in both MDM as applicable and the Disposition within this note     Time User Action Codes Description Comment    4/23/2021  1:26 AM Eros Stephanie, [address] [R77 8] Elevated troponin     4/23/2021  1:26 AM Eros Gaffneya, [address] [U07 1] COVID-19       ED Disposition     ED Disposition Condition Date/Time Comment    Admit Stable Fri Apr 23, 2021  1:26 AM Case was discussed with slim ap and the patient's admission status was agreed to be Admission Status: observation status to the service of Dr Tierney Lacks   Follow-up Information    None         Patient's Medications   Discharge Prescriptions    No medications on file     No discharge procedures on file      PDMP Review     None          ED Provider  Electronically Signed by           Vinicio Fitzpatrick MD  04/23/21 9941

## 2021-04-23 NOTE — ASSESSMENT & PLAN NOTE
· Patient denies chest pain  · Present on admission, troponin of 0 05  · Trend x3 or until peak  · Suspect 2/2 COVID-19  · EKG displayed normal sinus rhythm, heart rate 85, no ischemic changes noted  · Telemetry monitoring  · Obtain lipid and A1C

## 2021-04-23 NOTE — UTILIZATION REVIEW
Initial Clinical Review  OBSERVATION ADMISSION 4/23/2021 0127 CONVERTED TO INPAITENT ADMISSION 4/23/2021 1728 DUE TO COVID 19 VIRUS INFECTION REQ IV MEDICATIONS PER mild covid TREATMENT PLAN & IVF FOR CONT DIARRHEA  Admission: Date/Time/Statement:  Admission Orders (From admission, onward)     Ordered        04/23/21 1728  Inpatient Admission  Once         04/23/21 0127  Place in Observation  Once                   04/23/21 1729  Inpatient Admission Once     Question Answer Comment   Level of Care Med Surg    Estimated length of stay More than 2 Midnights    Certification I certify that inpatient services are medically necessary for this patient for a duration of greater than two midnights  See H&P and MD Progress Notes for additional information about the patient's course of treatment  04/23/21 1728     ED Arrival Information     Expected Arrival Acuity Means of Arrival Escorted By Service Admission Type    - 4/22/2021 22:46 Urgent Wheelchair Spouse Hospitalist Urgent    Arrival Complaint    COVID+/DIARRHEA/WEAKNESS        Chief Complaint   Patient presents with    Diarrhea     pt is covid + and has had diahrrea for a week and now feels very weak       Initial Presentation: 63 yo female obesity, HTN, and GERD, known COVID exposure to ED from home presents with 7 days of fatigue, dry cough, chills w intermittent HA now states she is experiencing non bloody diarrhea for past 5 days  In ED COVID 19 pcr= positive  Cont MILD COVID pathway; IV Remdesivir total 5 days; vitamin therapy; monitor saturations on room air, cont IVF & monitor electrolytes  Daily CBC, CMP, inflammatory markers, trend trop & BPs  Date: 4/23   Day 2: CONT W COVID MILD TREATMENT PATHWAY  Cont w diarrhea w diarrhea; CDIFF PCR= negative; start imodium  Cont IVF, IV Remdesivir, monitor daily labs, O2 saturations   In room air     ED Triage Vitals   Temperature Pulse Respirations Blood Pressure SpO2   04/22/21 2251 04/22/21 2251 04/22/21 2251 04/22/21 2253 04/22/21 2251   97 7 °F (36 5 °C) 96 18 153/72 95 %      Temp Source Heart Rate Source Patient Position - Orthostatic VS BP Location FiO2 (%)   04/23/21 0320 04/22/21 2251 04/22/21 2251 04/22/21 2251 --   Oral Monitor Sitting Left arm       Pain Score       04/22/21 2251       No Pain          Wt Readings from Last 1 Encounters:   04/23/21 121 kg (266 lb)     Additional Vital Signs:   Date/Time  Temp  Pulse  Resp  BP  MAP (mmHg)  SpO2  O2 Device  Patient Position - Orthostatic VS   04/23/21 1514  99 1 °F (37 3 °C)  79  16  139/68  97  94 %  None (Room air)  Lying       Date/Time  Temp  Pulse  Resp  BP  MAP (mmHg)  SpO2  O2 Device  Patient Position - Orthostatic VS   04/23/21 0700  99 4 °F (37 4 °C)  99  18  147/76  --  93 %  None (Room air)  Lying   04/23/21 0320  99 4 °F (37 4 °C)  84  18  146/68  98  95 %  None (Room air)  Lying   04/23/21 0100  --  76  18  122/62  86  93 %  None (Room air)  Lying   04/23/21 0000  --  86  18  137/73  99  94 %  None (Room air)  Lying   04/22/21 2340  --  --  --  --  --  --  None (Room air)  --   04/22/21 2253  --  --  --  153/72  --  --  --  --   04/22/21 2251  97 7 °F (36 5 °C)  96  18  --  --  95 %  None (Room air)  Sitting      Weights (last 14 days)    Date/Time  Weight  Weight Method  Height   04/23/21 0320  121 kg (266 lb)  Standing scale  5' 4" (1 626 m)     Pertinent Labs/Diagnostic Test Results:   Results from last 7 days   Lab Units 04/22/21  2353   SARS-COV-2  Positive*     Results from last 7 days   Lab Units 04/23/21  0552 04/22/21  2336   WBC Thousand/uL 4 10* 4 94   HEMOGLOBIN g/dL 14 2 15 7*   HEMATOCRIT % 42 7 47 6*   PLATELETS Thousands/uL 175  166 198   NEUTROS ABS Thousands/µL 2 60 3 28         Results from last 7 days   Lab Units 04/23/21  0552 04/22/21  2336   SODIUM mmol/L 137 135*   POTASSIUM mmol/L 3 2* 3 5   CHLORIDE mmol/L 103 101   CO2 mmol/L 24 23   ANION GAP mmol/L 10 11   BUN mg/dL 15 18   CREATININE mg/dL 0 92 1 14   EGFR ml/min/1 73sq m 67 52   CALCIUM mg/dL 7 8* 8 6     Results from last 7 days   Lab Units 04/23/21  0552 04/22/21  2336   AST U/L 43 47*   ALT U/L 45 53   ALK PHOS U/L 47 55   TOTAL PROTEIN g/dL 6 7 7 9   ALBUMIN g/dL 3 3* 3 7   TOTAL BILIRUBIN mg/dL 0 38 0 37         Results from last 7 days   Lab Units 04/23/21  0552 04/22/21  2336   GLUCOSE RANDOM mg/dL 104 111         Results from last 7 days   Lab Units 04/22/21  2336   HEMOGLOBIN A1C % 5 9*   EAG mg/dl 123     No results found for: BETA-HYDROXYBUTYRATE               Results from last 7 days   Lab Units 04/22/21  2336   CK TOTAL U/L 209*   CK MB INDEX % <1 0   CK MB ng/mL 0 6     Results from last 7 days   Lab Units 04/23/21  0553 04/23/21  0247 04/22/21  2353   TROPONIN I ng/mL 0 03 0 05* 0 05*     Results from last 7 days   Lab Units 04/22/21  2353   D-DIMER QUANTITATIVE ug/ml FEU 0 55*     Results from last 7 days   Lab Units 04/22/21  2353   PROTIME seconds 12 3   INR  0 91   PTT seconds 32         Results from last 7 days   Lab Units 04/23/21  0552 04/23/21  0247   PROCALCITONIN ng/ml <0 05 <0 05     Results from last 7 days   Lab Units 04/22/21  2353   LACTIC ACID mmol/L 1 3             Results from last 7 days   Lab Units 04/22/21  2336   NT-PRO BNP pg/mL 129*     Results from last 7 days   Lab Units 04/23/21  0323   FERRITIN ng/mL 550*         Results from last 7 days   Lab Units 04/22/21  2336   LIPASE u/L 777*     Results from last 7 days   Lab Units 04/22/21  2336   CRP mg/L 6 2*     XR chest 2 views   Final Result by  MD (04/23 0347)      No acute cardiopulmonary disease     4/23 ekg nsr  ED Treatment:   Medication Administration from 04/22/2021 2245 to 04/23/2021 0311       Date/Time Order Dose Route Action     04/23/2021 0021 multi-electrolyte (ISOLYTE-S PH 7 4) bolus 1,000 mL 1,000 mL Intravenous New Bag     04/22/2021 2353 ondansetron (ZOFRAN) injection 4 mg 4 mg Intravenous Not Given     04/22/2021 2533 acetaminophen (TYLENOL) tablet 650 mg 650 mg Oral Given 04/23/2021 0247 ascorbic acid (VITAMIN C) tablet 1,000 mg 1,000 mg Oral Given     04/23/2021 0248 sodium chloride 0 9 % infusion 50 mL/hr Intravenous New Bag        Past Medical History:   Diagnosis Date    GERD (gastroesophageal reflux disease)     No medications    History of cervical dysplasia     History cryosurgery    Hypertension     Mitral regurgitation     Obesity     Postmenopausal bleeding     Rash     On left upper thigh and under abdominal fold- is seen by dermatologist    Stress incontinence     Thrombophlebitis Early 1990's    Superficial, right calf, from birth control pill    Wears glasses      Present on Admission:   Essential hypertension   Elevated troponin      Admitting Diagnosis: Diarrhea [R19 7]  Elevated troponin [R77 8]  COVID-19 [U07 1]  Age/Sex: 64 y o  female  Admission Orders:  Contact & airborne isolation  scd  Scheduled Medications:  amLODIPine, 2 5 mg, Oral, QAM  ascorbic acid, 1,000 mg, Oral, Q12H SHARLA  aspirin, 81 mg, Oral, Daily  cholecalciferol, 2,000 Units, Oral, Daily  enoxaparin, 40 mg, Subcutaneous, BID  metoprolol succinate, 100 mg, Oral, QAM  zinc sulfate, 220 mg, Oral, Daily    Followed by  Montserrat Mcmillan ON 4/30/2021] multivitamin-minerals, 1 tablet, Oral, Daily  ondansetron, 4 mg, Intravenous, Once  [START ON 4/24/2021] remdesivir, 100 mg, Intravenous, Q24H  triamterene-hydrochlorothiazide, 1 tablet, Oral, Daily    Continuous IV Infusions:  sodium chloride, 50 mL/hr, Intravenous, Continuous  PRN Meds:  acetaminophen, 650 mg, Oral, Q6H PRN  ondansetron, 4 mg, Intravenous, Q6H PRN  Network Utilization Review Department  ATTENTION: Please call with any questions or concerns to [phone] and carefully listen to the prompts so that you are directed to the right person   All voicemails are confidential   Laure Woods all requests for admission clinical reviews, approved or denied determinations and any other requests to dedicated fax number below belonging to the campus where the patient is receiving treatment   List of dedicated fax numbers for the Facilities:  [address] [place] [address] DENIALS (Administrative/Medical Necessity) [phone]   [address] (Maternity/NICU/Pediatrics) [phone]   [address] Hayley [place] [address] [address] [place] [address] [phone] [phone]

## 2021-04-24 LAB
ALBUMIN SERPL BCP-MCNC: 3 G/DL (ref 3.5–5)
ALP SERPL-CCNC: 46 U/L (ref 46–116)
ALT SERPL W P-5'-P-CCNC: 44 U/L (ref 12–78)
ANION GAP SERPL CALCULATED.3IONS-SCNC: 9 MMOL/L (ref 4–13)
AST SERPL W P-5'-P-CCNC: 44 U/L (ref 5–45)
BASOPHILS # BLD AUTO: 0.02 THOUSANDS/ΜL (ref 0–0.1)
BASOPHILS NFR BLD AUTO: 1 % (ref 0–1)
BILIRUB SERPL-MCNC: 0.37 MG/DL (ref 0.2–1)
BUN SERPL-MCNC: 16 MG/DL (ref 5–25)
CALCIUM ALBUM COR SERPL-MCNC: 8.9 MG/DL (ref 8.3–10.1)
CALCIUM SERPL-MCNC: 8.1 MG/DL (ref 8.3–10.1)
CAMPYLOBACTER DNA SPEC NAA+PROBE: NORMAL
CHLORIDE SERPL-SCNC: 105 MMOL/L (ref 100–108)
CO2 SERPL-SCNC: 23 MMOL/L (ref 21–32)
CREAT SERPL-MCNC: 0.94 MG/DL (ref 0.6–1.3)
EOSINOPHIL # BLD AUTO: 0 THOUSAND/ΜL (ref 0–0.61)
EOSINOPHIL NFR BLD AUTO: 0 % (ref 0–6)
ERYTHROCYTE [DISTWIDTH] IN BLOOD BY AUTOMATED COUNT: 13.8 % (ref 11.6–15.1)
GFR SERPL CREATININE-BSD FRML MDRD: 66 ML/MIN/1.73SQ M
GLUCOSE SERPL-MCNC: 93 MG/DL (ref 65–140)
HCT VFR BLD AUTO: 42.5 % (ref 34.8–46.1)
HGB BLD-MCNC: 14.1 G/DL (ref 11.5–15.4)
IMM GRANULOCYTES # BLD AUTO: 0.01 THOUSAND/UL (ref 0–0.2)
IMM GRANULOCYTES NFR BLD AUTO: 0 % (ref 0–2)
LYMPHOCYTES # BLD AUTO: 1.37 THOUSANDS/ΜL (ref 0.6–4.47)
LYMPHOCYTES NFR BLD AUTO: 36 % (ref 14–44)
MCH RBC QN AUTO: 29.1 PG (ref 26.8–34.3)
MCHC RBC AUTO-ENTMCNC: 33.2 G/DL (ref 31.4–37.4)
MCV RBC AUTO: 88 FL (ref 82–98)
MONOCYTES # BLD AUTO: 0.38 THOUSAND/ΜL (ref 0.17–1.22)
MONOCYTES NFR BLD AUTO: 10 % (ref 4–12)
NEUTROPHILS # BLD AUTO: 2 THOUSANDS/ΜL (ref 1.85–7.62)
NEUTS SEG NFR BLD AUTO: 53 % (ref 43–75)
NRBC BLD AUTO-RTO: 0 /100 WBCS
PLATELET # BLD AUTO: 175 THOUSANDS/UL (ref 149–390)
PMV BLD AUTO: 10.3 FL (ref 8.9–12.7)
POTASSIUM SERPL-SCNC: 3.5 MMOL/L (ref 3.5–5.3)
PROCALCITONIN SERPL-MCNC: <0.05 NG/ML
PROT SERPL-MCNC: 6.8 G/DL (ref 6.4–8.2)
RBC # BLD AUTO: 4.85 MILLION/UL (ref 3.81–5.12)
SALMONELLA DNA SPEC QL NAA+PROBE: NORMAL
SHIGA TOXIN STX GENE SPEC NAA+PROBE: NORMAL
SHIGELLA DNA SPEC QL NAA+PROBE: NORMAL
SODIUM SERPL-SCNC: 137 MMOL/L (ref 136–145)
WBC # BLD AUTO: 3.78 THOUSAND/UL (ref 4.31–10.16)

## 2021-04-24 PROCEDURE — 80053 COMPREHEN METABOLIC PANEL: CPT | Performed by: INTERNAL MEDICINE

## 2021-04-24 PROCEDURE — 84145 PROCALCITONIN (PCT): CPT | Performed by: NURSE PRACTITIONER

## 2021-04-24 PROCEDURE — 99232 SBSQ HOSP IP/OBS MODERATE 35: CPT | Performed by: INTERNAL MEDICINE

## 2021-04-24 PROCEDURE — 85025 COMPLETE CBC W/AUTO DIFF WBC: CPT | Performed by: INTERNAL MEDICINE

## 2021-04-24 RX ADMIN — Medication 2000 UNITS: at 08:02

## 2021-04-24 RX ADMIN — METOPROLOL SUCCINATE 100 MG: 100 TABLET, EXTENDED RELEASE ORAL at 08:01

## 2021-04-24 RX ADMIN — TRIAMTERENE AND HYDROCHLOROTHIAZIDE 1 TABLET: 37.5; 25 TABLET ORAL at 08:01

## 2021-04-24 RX ADMIN — OXYCODONE HYDROCHLORIDE AND ACETAMINOPHEN 1000 MG: 500 TABLET ORAL at 20:16

## 2021-04-24 RX ADMIN — ASPIRIN 81 MG: 81 TABLET, COATED ORAL at 08:02

## 2021-04-24 RX ADMIN — ENOXAPARIN SODIUM 40 MG: 40 INJECTION SUBCUTANEOUS at 08:01

## 2021-04-24 RX ADMIN — REMDESIVIR 100 MG: 100 INJECTION, POWDER, LYOPHILIZED, FOR SOLUTION INTRAVENOUS at 03:29

## 2021-04-24 RX ADMIN — ENOXAPARIN SODIUM 40 MG: 40 INJECTION SUBCUTANEOUS at 20:17

## 2021-04-24 RX ADMIN — GUAIFENESIN 600 MG: 600 TABLET, EXTENDED RELEASE ORAL at 20:16

## 2021-04-24 RX ADMIN — ZINC SULFATE 220 MG (50 MG) CAPSULE 220 MG: CAPSULE at 08:01

## 2021-04-24 RX ADMIN — ACETAMINOPHEN 650 MG: 325 TABLET, FILM COATED ORAL at 18:08

## 2021-04-24 RX ADMIN — AMLODIPINE BESYLATE 2.5 MG: 2.5 TABLET ORAL at 08:01

## 2021-04-24 RX ADMIN — GUAIFENESIN 600 MG: 600 TABLET, EXTENDED RELEASE ORAL at 08:01

## 2021-04-24 RX ADMIN — ACETAMINOPHEN 650 MG: 325 TABLET, FILM COATED ORAL at 08:02

## 2021-04-24 RX ADMIN — OXYCODONE HYDROCHLORIDE AND ACETAMINOPHEN 1000 MG: 500 TABLET ORAL at 08:01

## 2021-04-24 RX ADMIN — LOPERAMIDE HYDROCHLORIDE 2 MG: 2 CAPSULE ORAL at 09:59

## 2021-04-24 RX ADMIN — LOPERAMIDE HYDROCHLORIDE 2 MG: 2 CAPSULE ORAL at 18:08

## 2021-04-24 NOTE — ASSESSMENT & PLAN NOTE
Initially presented with complaints of diarrhea and weakness/fatigue and family/contact with COVID (grandson)  Found to be COVID positive oxygenating on room air  Admitted under the mild pathway - c/w Remdesivir course - c/w vitamin C/D and zinc/multivitamin supplementation  Trend inflammatory markers  Serum procalcitonin is negative - observe off antibiotics  Encourage self-proning as tolerated  Currently oxygenating on room air - no need for corticosteroids  Supportive care otherwise - maintain strict airborne/contact precautions

## 2021-04-24 NOTE — ASSESSMENT & PLAN NOTE
Troponin peak of 0 05  Likely a non-MI troponin elevation in the setting of infection  Serial EKGs unremarkable

## 2021-04-24 NOTE — ASSESSMENT & PLAN NOTE
Presented w/ complaints of nonbloody diarrhea over the last several days  Stool culture and Clostridium difficile PCR negative - okay to use PRN Imodium symptomatic relief  Continue IV fluids for hydration - monitor/replete electrolyte deficiencies if present  Supportive care  Avoid stool softeners and laxatives

## 2021-04-24 NOTE — PROGRESS NOTES
[facility]  Progress Note - Zuri Primrosalba 1960, 64 y o  female MRN: [id number]  Unit/Bed#: S -Osmel Encounter: [id number]  Primary Care Provider: Oscar Peterson MD   Date and time admitted to hospital: 4/22/2021 10:57 PM      * COVID-19 virus infection  Assessment & Plan  Initially presented with complaints of diarrhea and weakness/fatigue and family/contact with COVID (grandson)  Found to be COVID positive oxygenating on room air  Admitted under the mild pathway - c/w Remdesivir course - c/w vitamin C/D and zinc/multivitamin supplementation  Trend inflammatory markers  Serum procalcitonin is negative - observe off antibiotics  Encourage self-proning as tolerated  Currently oxygenating on room air - no need for corticosteroids  Supportive care otherwise - maintain strict airborne/contact precautions    Diarrhea  Assessment & Plan  Presented w/ complaints of nonbloody diarrhea over the last several days  Stool culture and Clostridium difficile PCR negative - okay to use PRN Imodium symptomatic relief  Continue IV fluids for hydration - monitor/replete electrolyte deficiencies if present  Supportive care  Avoid stool softeners and laxatives    Elevated troponin  Assessment & Plan  Troponin peak of 0 05  Likely a non-MI troponin elevation in the setting of infection  Serial EKGs unremarkable    Morbid obesity  Assessment & Plan  BMI of 45 66  Lifestyle/diet modifications    Essential hypertension  Assessment & Plan  Low-sodium diet  Continue Norvasc/Maxzide/Toprol-XL    Hypokalemia  Assessment & Plan  Possibly due to insensible losses from diarrhea  Monitor/replete serum potassium and magnesium      DVT Prophylaxis:  Lovenox       Patient Centered Rounds:  I have performed bedside rounds and discussed plan of care with nursing today      Discussions with Specialists or Other Care Team Provider:  see above assessments if applicable    Education and Discussions with Family / Patient:  Patient at bedside    Time Spent for Care:  32 minutes  More than 50% of total time spent on counseling and coordination of care as described above  Current Length of Stay: 1 day(s)    Current Patient Status: Inpatient   Certification Statement:  Patient will continue to require additional hospital stay due to assessments as noted above  Code Status: Level 1 - Full Code        Subjective:     Seen/examined earlier in the day  Complains of weakness/fatigue but denies any shortness of breath at this time  She does acknowledge frustration over recurrent diarrhea  Objective:     Vitals:   Temp (24hrs), Av 7 °F (36 5 °C), Min:97 4 °F (36 3 °C), Max:98 1 °F (36 7 °C)    Temp:  [97 4 °F (36 3 °C)-98 1 °F (36 7 °C)] 97 4 °F (36 3 °C)  HR:  [77-86] 77  Resp:  [16] 16  BP: (112-127)/(56-66) 112/61  SpO2:  [92 %-95 %] 92 %  Body mass index is 45 66 kg/m²  Input and Output Summary (last 24 hours):        Intake/Output Summary (Last 24 hours) at 2021 1751  Last data filed at 2021 1300  Gross per 24 hour   Intake 700 ml   Output 0 ml   Net 700 ml       Physical Exam:     GENERAL:  Obese - weak/fatigued  HEAD:  Normocephalic - atraumatic  EYES: PERRL - EOMI   MOUTH:  Mucosa moist  NECK:  Supple - full range of motion  CARDIAC:  Rate controlled - S1/S2 positive  PULMONARY:  Clear breath sounds bilaterally - nonlabored respirations at rest  ABDOMEN:  Soft - currently nontender/nondistended - active bowel sounds  MUSCULOSKELETAL:  Motor strength/range of motion intact  NEUROLOGIC:  Alert/oriented at baseline  SKIN:  Chronic wrinkles/blemishes   PSYCHIATRIC:  Mood/affect stable      Additional Data:     Labs & Recent Cultures:    Results from last 7 days   Lab Units 21  0514   WBC Thousand/uL 3 78*   HEMOGLOBIN g/dL 14 1   HEMATOCRIT % 42 5   PLATELETS Thousands/uL 175   NEUTROS PCT % 53   LYMPHS PCT % 36   MONOS PCT % 10   EOS PCT % 0     Results from last 7 days   Lab Units 04/24/21  0514   POTASSIUM mmol/L 3 5   CHLORIDE mmol/L 105   CO2 mmol/L 23   BUN mg/dL 16   CREATININE mg/dL 0 94   CALCIUM mg/dL 8 1*   ALK PHOS U/L 46   ALT U/L 44   AST U/L 44     Results from last 7 days   Lab Units 04/22/21  2353   INR  0 91         Results from last 7 days   Lab Units 04/22/21  2336   HEMOGLOBIN A1C % 5 9*     Results from last 7 days   Lab Units 04/24/21  0514 04/23/21  0552 04/23/21  0247 04/22/21  2353   LACTIC ACID mmol/L  --   --   --  1 3   PROCALCITONIN ng/ml <0 05 <0 05 <0 05  --          Results from last 7 days   Lab Units 04/23/21  0858   C DIFF TOXIN B BY PCR  Negative         Last 24 Hours Medication List:   Current Facility-Administered Medications   Medication Dose Route Frequency Provider Last Rate    acetaminophen  650 mg Oral Q6H PRN Nel Cram, CRNP      amLODIPine  2 5 mg Oral QAM Nel Cram, CRNP      ascorbic acid  1,000 mg Oral Q12H [facility] Nel Cram, CRNP      aspirin  81 mg Oral Daily Nel Cram, CRNP      benzonatate  100 mg Oral TID PRN Radha Hall MD      cholecalciferol  2,000 Units Oral Daily Nel Cram, CRNP      enoxaparin  40 mg Subcutaneous BID Nel Cram, CRNP      guaiFENesin  600 mg Oral Q12H [facility] aKmeron Martínez MD      loperamide  2 mg Oral TID PRN Kameron Martínez MD      metoprolol succinate  100 mg Oral QAM Nel Cram, CRNP      zinc sulfate  220 mg Oral Daily YOLIS Dolan      Followed by   Panfilo Graham ON 4/30/2021] multivitamin-minerals  1 tablet Oral Daily Nel Cram, CRNP      ondansetron  4 mg Intravenous Once Osman Arce MD      ondansetron  4 mg Intravenous Q6H PRN Nel Cram, CRNP      remdesivir  100 mg Intravenous Q24H Nel Cram, CRNP      sodium chloride  100 mL/hr Intravenous Continuous Leonel Duarte  mL/hr (04/24/21 1320)    triamterene-hydrochlorothiazide  1 tablet Oral Daily Nel Cram, CRNP                    ** Please Note: This note is constructed using a voice recognition dictation system  An occasional wrong word/phrase or sound-a-like substitution may have been picked up by dictation device due to the inherent limitations of voice recognition software  Read the chart carefully and recognize, using reasonable context, where substitutions may have occurred  **

## 2021-04-24 NOTE — UTILIZATION REVIEW
Continued Stay Review    Date: 4/24/2021                          Current Patient Class: inpt  Current Level of Care: med surg   HPI:61 y o  female initially admitted on OBSERVATION ADMISSION 4/23/2021 0127 CONVERTED TO INPAITENT ADMISSION 4/23/2021 1728 DUE TO COVID 19 VIRUS INFECTION REQ IV MEDICATIONS PER mild covid TREATMENT PLAN & IVF FOR CONT DIARRHEA     Assessment/Plan: 4/24 On going MILD covid pathway ; still in room air w low 90s saturations  On IV remdesivir, cont IVF   Room air low 90s saturations  No nursing documentation of diarrhea today   Cont daily labs, monitor wbc- low today, O2 saturations, resp effort       Vital Signs:   Date/Time  Temp  Pulse  Resp  BP  MAP (mmHg)  SpO2  O2 Device  Patient Position - Orthostatic VS   04/24/21 1533  97 4 °F (36 3 °C)Abnormal   77  16  112/61  81  92 %  None (Room air)  Lying   04/24/21 0700  98 1 °F (36 7 °C)  86  16  127/66  --  92 %  None (Room air)  Sitting   04/23/21 2212  97 6 °F (36 4 °C)  83  16  123/56  80  95 %  None (Room air)  Lying         Pertinent Labs/Diagnostic Results:   Results from last 7 days   Lab Units 04/22/21  2353   SARS-COV-2  Positive*     Results from last 7 days   Lab Units 04/24/21  0514 04/23/21  0552 04/22/21  2336   WBC Thousand/uL 3 78* 4 10* 4 94   HEMOGLOBIN g/dL 14 1 14 2 15 7*   HEMATOCRIT % 42 5 42 7 47 6*   PLATELETS Thousands/uL 175 175  166 198   NEUTROS ABS Thousands/µL 2 00 2 60 3 28         Results from last 7 days   Lab Units 04/24/21  0514 04/23/21  0552 04/22/21  2336   SODIUM mmol/L 137 137 135*   POTASSIUM mmol/L 3 5 3 2* 3 5   CHLORIDE mmol/L 105 103 101   CO2 mmol/L 23 24 23   ANION GAP mmol/L 9 10 11   BUN mg/dL 16 15 18   CREATININE mg/dL 0 94 0 92 1 14   EGFR ml/min/1 73sq m 66 67 52   CALCIUM mg/dL 8 1* 7 8* 8 6     Results from last 7 days   Lab Units 04/24/21  0514 04/23/21  0552 04/22/21  2336   AST U/L 44 43 47*   ALT U/L 44 45 53   ALK PHOS U/L 46 47 55   TOTAL PROTEIN g/dL 6 8 6 7 7 9   ALBUMIN g/dL 3  0* 3 3* 3 7   TOTAL BILIRUBIN mg/dL 0 37 0 38 0 37         Results from last 7 days   Lab Units 04/24/21  0514 04/23/21  0552 04/22/21  2336   GLUCOSE RANDOM mg/dL 93 104 111         Results from last 7 days   Lab Units 04/22/21  2336   HEMOGLOBIN A1C % 5 9*   EAG mg/dl 123     No results found for: BETA-HYDROXYBUTYRATE               Results from last 7 days   Lab Units 04/22/21  2336   CK TOTAL U/L 209*   CK MB INDEX % <1 0   CK MB ng/mL 0 6     Results from last 7 days   Lab Units 04/23/21  0553 04/23/21  0247 04/22/21  2353   TROPONIN I ng/mL 0 03 0 05* 0 05*     Results from last 7 days   Lab Units 04/22/21  2353   D-DIMER QUANTITATIVE ug/ml FEU 0 55*     Results from last 7 days   Lab Units 04/22/21  2353   PROTIME seconds 12 3   INR  0 91   PTT seconds 32         Results from last 7 days   Lab Units 04/24/21  0514 04/23/21  0552 04/23/21  0247   PROCALCITONIN ng/ml <0 05 <0 05 <0 05     Results from last 7 days   Lab Units 04/22/21  2353   LACTIC ACID mmol/L 1 3             Results from last 7 days   Lab Units 04/22/21  2336   NT-PRO BNP pg/mL 129*     Results from last 7 days   Lab Units 04/23/21  0323   FERRITIN ng/mL 550*         Results from last 7 days   Lab Units 04/22/21  2336   LIPASE u/L 777*     Results from last 7 days   Lab Units 04/22/21  2336   CRP mg/L 6 2*                             Results from last 7 days   Lab Units 04/23/21  0858   C DIFF TOXIN B BY PCR  Negative     Results from last 7 days   Lab Units 04/23/21  0858   SALMONELLA SP PCR  None Detected   SHIGELLA SP/ENTEROINVASIVE E  COLI (EIEC)  None Detected   CAMPYLOBACTER SP (JEJUNI AND COLI)  None Detected   SHIGA TOXIN 1/SHIGA TOXIN 2  None Detected     Medications:   Scheduled Medications:  amLODIPine, 2 5 mg, Oral, QAM  ascorbic acid, 1,000 mg, Oral, Q12H SHARLA  aspirin, 81 mg, Oral, Daily  cholecalciferol, 2,000 Units, Oral, Daily  enoxaparin, 40 mg, Subcutaneous, BID  guaiFENesin, 600 mg, Oral, Q12H [facility]  metoprolol succinate, 100 mg, Oral, QAM  zinc sulfate, 220 mg, Oral, Daily    Followed by  Marcy Bolden ON 4/30/2021] multivitamin-minerals, 1 tablet, Oral, Daily  ondansetron, 4 mg, Intravenous, Once  remdesivir, 100 mg, Intravenous, Q24H  triamterene-hydrochlorothiazide, 1 tablet, Oral, Daily    Continuous IV Infusions:  sodium chloride, 100 mL/hr, Intravenous, Continuous      PRN Meds:  acetaminophen, 650 mg, Oral, Q6H PRN  benzonatate, 100 mg, Oral, TID PRN  loperamide, 2 mg, Oral, TID PRN  ondansetron, 4 mg, Intravenous, Q6H PRN    Discharge Plan: d    Network Utilization Review Department  ATTENTION: Please call with any questions or concerns to [phone] and carefully listen to the prompts so that you are directed to the right person  All voicemails are confidential   Lynette Kussmaul all requests for admission clinical reviews, approved or denied determinations and any other requests to dedicated fax number below belonging to the campus where the patient is receiving treatment   List of dedicated fax numbers for the Facilities:  [address] DENIALS (Administrative/Medical Necessity) [phone]   [address] (Maternity/NICU/Pediatrics) [phone] [address] Hayley Clinton 6976 [address] [place] [address] Elham Hernandez [address] [facility] [address] [facility] [phone]

## 2021-04-24 NOTE — MALNUTRITION/BMI
Hospitalist Progress Note  Allie Arias NP  Answering service: [phone] from in house phone  Cell: [phone]   Date of Service:  2018  NAME:  Tomasz Nixon  :  1940  MRN:  [id number]    Admission Summary:   Pt presented to the ED with fever. The patient was found to have temperature of 104 the am of admit at her LTC facility - she was given tylenol which brought down her temp to 100.3. She also had an episode of vomiting/poor appetite and her urine was darker than usual.  EMS indicated that staff at [address] [place] [address] was concerned pt aspirated. Interval history / Subjective:      Pt in bed, no new complaints but still feels generalized pain - says turning helps relieve her pain and the addition of oxycodone yesterday helped. Assessment & Plan:     Severe sepsis (hypotension, fever, lactic acidosis, leukocytosis with UTI) POA:  Resolved  - + pyuria. UA was turbid with + large leuks, WBC 20-50 and 4+ bacteria.  UTI has been treated with Rocephin  - + watery liquid stool - c-diff NEGATIVE -> Flagyl stopped ()  - lactic acidosis resolved     Dysphagia:  - speech evaluated  - puree diet with nectar thick liquids  - scopalamine patch for secretions    CAUTI (long-term chronic mosquera) (POA):  - E-coli and Providenecia UTI - treated with 5 days of ceftriaxone  - ED changed catheter per nursing report    Hypokalemia: Replete, added K to IVF  - magnesium now normal    Hypernatremia: improved, regressed, resolved  - switch IVF to 1/2 NS for maintenance  - Na stable    Hx of hypertension:    - on lisinopril and may have hydralazine prn  - BP fairly well controlled    Diarrhea (POA):  - stool culture is negative, c-diff negative  - will need fecal fat and enzymes as outpatient if continues to analyze for malabsorption from pancreatic insufficiency  - lactose free diet (added restriction to diet order)  - immodium will be scheduled for today  - This medical record reflects one or more clinical indicators suggestive of malnutrition and/or morbid obesity  Malnutrition Findings:   Adult Malnutrition type: Acute illness  Adult Degree of Malnutrition: Malnutrition of mild degree  Malnutrition Characteristics: Weight loss, Inadequate energy(Mild malnutrition r/t acute illness as evidenced by pt report of poor appetite, estimated <50-75% intakes of estimated needs and wt loss of 2 9% x 1 week )    BMI Findings: Body mass index is 45 66 kg/m²  See Nutrition note dated 4/24/2021 for additional details  Completed nutrition assessment is viewable in the nutrition documentation  increase questran to TID 6/25 though nurse indicates pt having a hard time taking all of it in    BENNY/Dehydration with elevated Creatinine: Resolved    Hx Brain injury with functional quadriplegia:    - Bed bound, Supportive care, reposition patient  - communicates with simple words  - palliative care consult, nephew to bring in mPOA papers and meet with palliative today    Failure to Thrive  - BMI 19.84, normal weight  - decreased intake 2/2 dysphagia  - nephew and patient do not want PEG in future    Code status: DNR  DVT prophylaxis: Lovenox   Care Plan discussed with: patient  Disposition: pt in long term care, plans to return in next 1-2 days  POA is Sascha Bah, her nephew: [phone]. Palliative care has met with pt and Sascha Bah, he is now her mPOA and she is a DNR (balwinder filled out). Hospital Problems  Never Reviewed          Codes Class Noted POA    * (Principal)Sepsis [facility]) ICD-10-CM: A41.9  ICD-9-CM: 038.9, 995.91  6/15/2018 Yes        Catheter-associated urinary tract infection ([facility] [address] ICD-10-CM: T83.511A, N39.0  ICD-9-CM: 996.64, 599.0  6/15/2018 Yes        Diarrhea ICD-10-CM: R19.7  ICD-9-CM: 787.91  6/15/2018 Yes        Function[facility] quadri[facility]gia ([facility] ICD-10-CM: R53.2  ICD-9-CM: 780.72  6/15/2018 Yes            Review of Systems:   Alert, soft spoken. Denies HA. No chest pain or pressure. No SOB or GI complaints. Has \"generalized pain\". Vital Signs:    Last 24hrs VS reviewed since prior progress note.  Most recent are:  Visit Vitals    /84 (BP 1 Location: Right leg, BP Patient Position: At rest)    Pulse 79    Temp 98.4 °F (36.9 °C)    Resp 18    Ht 5' 2\" (1.575 m)    Wt 49.2 kg (108 lb 7.5 oz)    SpO2 99%    BMI 19.84 kg/m2       Intake/Output Summary (Last 24 hours) at 06/26/18 0842  Last data filed at 06/26/18 0716   Gross per 24 hour   Intake             1211 ml   Output              800 ml   Net              411 ml      Physical Examination:         Constitutional:  No acute distress, cooperative   ENT:  Oral mucous membranes moist. Lower teeth w/thick plaque. Lower jaw and lip protrudes   Resp:  CTA bilaterally. No wheezing. No accessory muscle use and on RA   CV:  Regular rhythm, normal rate, no murmurs. GI:  Soft, non distended, non-tender. Normoactive bowel sounds. + Flexiseal in place (~ 400 mL out charted for yesterday), bag changed this am    Musculoskeletal:  No edema, warm, 2+ pulses throughout    Neurologic:  Moves all extremities. Alert and oriented to person/family, place and time       Data Review:   Review and/or order of clinical lab test  Review and/or order of tests in the radiology section of CPT  Review and/or order of tests in the medicine section of CPT    Labs:     No results for input(s): WBC, HGB, HCT, PLT, HGBEXT, HCTEXT, PLTEXT, HGBEXT, HCTEXT, PLTEXT in the last 72 hours. Recent Labs      06/26/18   0354  06/25/18   0440   NA  138  143   K  3.2*  3.4*   CL  107  111*   CO2  24  24   BUN  5*  5*   CREA  0.31*  0.36*   GLU  87  105*   CA  7.7*  8.4*   MG  2.0  1.7   PHOS   --   2.7     No results for input(s): SGOT, GPT, ALT, AP, TBIL, TBILI, TP, ALB, GLOB, GGT, AML, LPSE in the last 72 hours. No lab exists for component: AMYP, HLPSE  No results for input(s): INR, PTP, APTT in the last 72 hours. No lab exists for component: INREXT, INREXT   No results for input(s): FE, TIBC, PSAT, FERR in the last 72 hours. No results found for: FOL, RBCF   No results for input(s): PH, PCO2, PO2 in the last 72 hours. No results for input(s): CPK, CKNDX, TROIQ in the last 72 hours.     No lab exists for component: CPKMB  No results found for: CHOL, CHOLX, CHLST, CHOLV, HDL, LDL, LDLC, DLDLP, TGLX, TRIGL, TRIGP, CHHD, CHHDX  Lab Results   Component Value Date/Time    Glucose (POC) 90 06/26/2018 06:31 AM    Glucose (POC) 138 (H) 06/25/2018 09:11 PM    Glucose (POC) 112 (H) 06/25/2018 04:17 PM    Glucose (POC) 139 (H) 06/25/2018 11:08 AM    Glucose (POC) 89 06/25/2018 05:42 AM Lab Results   Component Value Date/Time    Color DARK YELLOW 06/15/2018 07:48 PM    Appearance TURBID (A) 06/15/2018 07:48 PM    Specific gravity 1.024 06/15/2018 07:48 PM    pH (UA) 5.0 06/15/2018 07:48 PM    Protein 30 (A) 06/15/2018 07:48 PM    Glucose NEGATIVE  06/15/2018 07:48 PM    Ketone TRACE (A) 06/15/2018 07:48 PM    Urobilinogen 1.0 06/15/2018 07:48 PM    Nitrites NEGATIVE  06/15/2018 07:48 PM    Leukocyte Esterase LARGE (A) 06/15/2018 07:48 PM    Epithelial cells MODERATE (A) 06/15/2018 07:48 PM    Bacteria 4+ (A) 06/15/2018 07:48 PM    WBC 20-50 06/15/2018 07:48 PM    RBC 0-5 06/15/2018 07:48 PM     Medications Reviewed:     Current Facility-Administered Medications   Medication Dose Route Frequency    0.45% sodium chloride with KCl 20 mEq/L infusion   IntraVENous CONTINUOUS    potassium chloride (KAON 10%) 20 mEq/15 mL oral liquid 40 mEq  40 mEq Oral BID WITH MEALS    loperamide (IMODIUM) capsule 2 mg  2 mg Oral Q6H    acetaminophen (TYLENOL) tablet 650 mg  650 mg Oral Q6H    cholestyramine-aspartame (QUESTRAN LIGHT) packet 4 g  4 g Oral TID WITH MEALS    oxyCODONE IR (ROXICODONE) tablet 2.5 mg  2.5 mg Oral Q4H PRN    scopolamine (TRANSDERM-SCOP) 1 mg over 3 days 1 Patch  1 Patch TransDERmal Q72H    glucose chewable tablet 16 g  4 Tab Oral PRN    dextrose (D50W) injection syrg 12.5-25 g  12.5-25 g IntraVENous PRN    glucagon (GLUCAGEN) injection 1 mg  1 mg IntraMUSCular PRN    lactobac ac& pc-s.therm-b.anim (RAYSHAWN Q/RISAQUAD)  1 Cap Oral DAILY    lisinopril (PRINIVIL, ZESTRIL) tablet 5 mg  5 mg Oral DAILY    pantoprazole (PROTONIX) 2 mg/mL oral suspension 40 mg  40 mg Oral ACB    hydrALAZINE (APRESOLINE) 20 mg/mL injection 10 mg  10 mg IntraVENous Q6H PRN    loperamide (IMODIUM) capsule 2 mg  2 mg Oral Q4H PRN    albuterol-ipratropium (DUO-NEB) 2.5 MG-0.5 MG/3 ML  3 mL Nebulization Q4H PRN    sodium chloride (NS) flush 5-10 mL  5-10 mL IntraVENous Q8H    sodium chloride (NS) flush 5-10 mL  5-10 mL IntraVENous PRN    naloxone (NARCAN) injection 0.4 mg  0.4 mg IntraVENous PRN    enoxaparin (LOVENOX) injection 40 mg  40 mg SubCUTAneous Q24H    ondansetron (ZOFRAN) injection 4 mg  4 mg IntraVENous Q4H PRN   ______________________________________________________________________  EXPECTED LENGTH OF STAY: 4d 21h  ACTUAL LENGTH OF STAY:          [address] CINTR[address], NP

## 2021-04-25 VITALS
OXYGEN SATURATION: 94 % | DIASTOLIC BLOOD PRESSURE: 60 MMHG | SYSTOLIC BLOOD PRESSURE: 124 MMHG | BODY MASS INDEX: 45.41 KG/M2 | HEART RATE: 75 BPM | RESPIRATION RATE: 16 BRPM | WEIGHT: 266 LBS | HEIGHT: 64 IN | TEMPERATURE: 98.4 F

## 2021-04-25 DIAGNOSIS — I10 ESSENTIAL HYPERTENSION: ICD-10-CM

## 2021-04-25 LAB
ANION GAP SERPL CALCULATED.3IONS-SCNC: 10 MMOL/L (ref 4–13)
BASOPHILS # BLD AUTO: 0.01 THOUSANDS/ΜL (ref 0–0.1)
BASOPHILS NFR BLD AUTO: 0 % (ref 0–1)
BUN SERPL-MCNC: 13 MG/DL (ref 5–25)
CALCIUM SERPL-MCNC: 8 MG/DL (ref 8.3–10.1)
CHLORIDE SERPL-SCNC: 106 MMOL/L (ref 100–108)
CK MB SERPL-MCNC: 1.5 NG/ML (ref 0–5)
CK MB SERPL-MCNC: <1 % (ref 0–2.5)
CK SERPL-CCNC: 193 U/L (ref 26–192)
CO2 SERPL-SCNC: 22 MMOL/L (ref 21–32)
CREAT SERPL-MCNC: 0.84 MG/DL (ref 0.6–1.3)
CRP SERPL QL: 7 MG/L
D DIMER PPP FEU-MCNC: 0.41 UG/ML FEU
EOSINOPHIL # BLD AUTO: 0.01 THOUSAND/ΜL (ref 0–0.61)
EOSINOPHIL NFR BLD AUTO: 0 % (ref 0–6)
ERYTHROCYTE [DISTWIDTH] IN BLOOD BY AUTOMATED COUNT: 13.6 % (ref 11.6–15.1)
FERRITIN SERPL-MCNC: 747 NG/ML (ref 8–388)
GFR SERPL CREATININE-BSD FRML MDRD: 75 ML/MIN/1.73SQ M
GLUCOSE SERPL-MCNC: 84 MG/DL (ref 65–140)
HCT VFR BLD AUTO: 42.5 % (ref 34.8–46.1)
HGB BLD-MCNC: 13.8 G/DL (ref 11.5–15.4)
IMM GRANULOCYTES # BLD AUTO: 0.01 THOUSAND/UL (ref 0–0.2)
IMM GRANULOCYTES NFR BLD AUTO: 0 % (ref 0–2)
LYMPHOCYTES # BLD AUTO: 1.55 THOUSANDS/ΜL (ref 0.6–4.47)
LYMPHOCYTES NFR BLD AUTO: 35 % (ref 14–44)
MCH RBC QN AUTO: 28.8 PG (ref 26.8–34.3)
MCHC RBC AUTO-ENTMCNC: 32.5 G/DL (ref 31.4–37.4)
MCV RBC AUTO: 89 FL (ref 82–98)
MONOCYTES # BLD AUTO: 0.31 THOUSAND/ΜL (ref 0.17–1.22)
MONOCYTES NFR BLD AUTO: 7 % (ref 4–12)
NEUTROPHILS # BLD AUTO: 2.52 THOUSANDS/ΜL (ref 1.85–7.62)
NEUTS SEG NFR BLD AUTO: 58 % (ref 43–75)
NRBC BLD AUTO-RTO: 0 /100 WBCS
PLATELET # BLD AUTO: 191 THOUSANDS/UL (ref 149–390)
PMV BLD AUTO: 10.2 FL (ref 8.9–12.7)
POTASSIUM SERPL-SCNC: 3.4 MMOL/L (ref 3.5–5.3)
RBC # BLD AUTO: 4.79 MILLION/UL (ref 3.81–5.12)
SODIUM SERPL-SCNC: 138 MMOL/L (ref 136–145)
WBC # BLD AUTO: 4.41 THOUSAND/UL (ref 4.31–10.16)

## 2021-04-25 PROCEDURE — 86140 C-REACTIVE PROTEIN: CPT | Performed by: INTERNAL MEDICINE

## 2021-04-25 PROCEDURE — 82553 CREATINE MB FRACTION: CPT | Performed by: INTERNAL MEDICINE

## 2021-04-25 PROCEDURE — 82728 ASSAY OF FERRITIN: CPT | Performed by: INTERNAL MEDICINE

## 2021-04-25 PROCEDURE — 80048 BASIC METABOLIC PNL TOTAL CA: CPT | Performed by: INTERNAL MEDICINE

## 2021-04-25 PROCEDURE — 82550 ASSAY OF CK (CPK): CPT | Performed by: INTERNAL MEDICINE

## 2021-04-25 PROCEDURE — 85379 FIBRIN DEGRADATION QUANT: CPT | Performed by: INTERNAL MEDICINE

## 2021-04-25 PROCEDURE — 99239 HOSP IP/OBS DSCHRG MGMT >30: CPT | Performed by: INTERNAL MEDICINE

## 2021-04-25 PROCEDURE — 85025 COMPLETE CBC W/AUTO DIFF WBC: CPT | Performed by: INTERNAL MEDICINE

## 2021-04-25 RX ORDER — POTASSIUM CHLORIDE 20 MEQ/1
20 TABLET, EXTENDED RELEASE ORAL DAILY
Qty: 3 TABLET | Refills: 0 | Status: SHIPPED | OUTPATIENT
Start: 2021-04-25 | End: 2021-04-28

## 2021-04-25 RX ORDER — NYSTATIN 100000 [USP'U]/G
POWDER TOPICAL 3 TIMES DAILY
Qty: 15 G | Refills: 0 | Status: SHIPPED | OUTPATIENT
Start: 2021-04-25

## 2021-04-25 RX ORDER — ZINC SULFATE 50(220)MG
220 CAPSULE ORAL DAILY
Qty: 4 CAPSULE | Refills: 0 | Status: SHIPPED | OUTPATIENT
Start: 2021-04-26 | End: 2021-04-30

## 2021-04-25 RX ORDER — GUAIFENESIN 600 MG
600 TABLET, EXTENDED RELEASE 12 HR ORAL EVERY 12 HOURS SCHEDULED
Qty: 10 TABLET | Refills: 0 | Status: SHIPPED | OUTPATIENT
Start: 2021-04-25 | End: 2021-04-30

## 2021-04-25 RX ORDER — POTASSIUM CHLORIDE 20 MEQ/1
40 TABLET, EXTENDED RELEASE ORAL ONCE
Status: COMPLETED | OUTPATIENT
Start: 2021-04-25 | End: 2021-04-25

## 2021-04-25 RX ORDER — MULTIVITAMIN/IRON/FOLIC ACID 18MG-0.4MG
1 TABLET ORAL DAILY
Qty: 30 TABLET | Refills: 0 | Status: SHIPPED | OUTPATIENT
Start: 2021-04-30

## 2021-04-25 RX ORDER — BENZONATATE 100 MG/1
100 CAPSULE ORAL 3 TIMES DAILY PRN
Qty: 20 CAPSULE | Refills: 0 | Status: SHIPPED | OUTPATIENT
Start: 2021-04-25

## 2021-04-25 RX ORDER — POTASSIUM CHLORIDE 20 MEQ/1
40 TABLET, EXTENDED RELEASE ORAL EVERY 4 HOURS
Status: DISCONTINUED | OUTPATIENT
Start: 2021-04-25 | End: 2021-04-25

## 2021-04-25 RX ADMIN — ASPIRIN 81 MG: 81 TABLET, COATED ORAL at 08:44

## 2021-04-25 RX ADMIN — POTASSIUM CHLORIDE 40 MEQ: 1500 TABLET, EXTENDED RELEASE ORAL at 10:40

## 2021-04-25 RX ADMIN — ZINC SULFATE 220 MG (50 MG) CAPSULE 220 MG: CAPSULE at 08:43

## 2021-04-25 RX ADMIN — AMLODIPINE BESYLATE 2.5 MG: 2.5 TABLET ORAL at 08:44

## 2021-04-25 RX ADMIN — BENZONATATE 100 MG: 100 CAPSULE ORAL at 02:48

## 2021-04-25 RX ADMIN — OXYCODONE HYDROCHLORIDE AND ACETAMINOPHEN 1000 MG: 500 TABLET ORAL at 08:44

## 2021-04-25 RX ADMIN — GUAIFENESIN 600 MG: 600 TABLET, EXTENDED RELEASE ORAL at 08:44

## 2021-04-25 RX ADMIN — METOPROLOL SUCCINATE 100 MG: 100 TABLET, EXTENDED RELEASE ORAL at 08:44

## 2021-04-25 RX ADMIN — LOPERAMIDE HYDROCHLORIDE 2 MG: 2 CAPSULE ORAL at 10:40

## 2021-04-25 RX ADMIN — ENOXAPARIN SODIUM 40 MG: 40 INJECTION SUBCUTANEOUS at 08:43

## 2021-04-25 RX ADMIN — REMDESIVIR 100 MG: 100 INJECTION, POWDER, LYOPHILIZED, FOR SOLUTION INTRAVENOUS at 02:44

## 2021-04-25 RX ADMIN — Medication 2000 UNITS: at 08:44

## 2021-04-25 RX ADMIN — SODIUM CHLORIDE 100 ML/HR: 0.9 INJECTION, SOLUTION INTRAVENOUS at 02:44

## 2021-04-25 RX ADMIN — TRIAMTERENE AND HYDROCHLOROTHIAZIDE 1 TABLET: 37.5; 25 TABLET ORAL at 08:44

## 2021-04-25 NOTE — PLAN OF CARE
Problem: Nutrition/Hydration-ADULT  Goal: Nutrient/Hydration intake appropriate for improving, restoring or maintaining nutritional needs  Description: Monitor and assess patient's nutrition/hydration status for malnutrition  Collaborate with interdisciplinary team and initiate plan and interventions as ordered  Monitor patient's weight and dietary intake as ordered or per policy  Utilize nutrition screening tool and intervene as necessary  Determine patient's food preferences and provide high-protein, high-caloric foods as appropriate       INTERVENTIONS:  - Monitor oral intake, urinary output, labs, and treatment plans  - Assess nutrition and hydration status and recommend course of action  - Evaluate amount of meals eaten  - Assist patient with eating if necessary   - Allow adequate time for meals  - Recommend/ encourage appropriate diets, oral nutritional supplements, and vitamin/mineral supplements  - Order, calculate, and assess calorie counts as needed  - Recommend, monitor, and adjust tube feedings and TPN/PPN based on assessed needs  - Assess need for intravenous fluids  - Provide specific nutrition/hydration education as appropriate  - Include patient/family/caregiver in decisions related to nutrition  4/25/2021 1116 by Breanna Pulido RN  Outcome: Adequate for Discharge  4/25/2021 1023 by Breanna Pulido RN  Outcome: Progressing     Problem: PAIN - ADULT  Goal: Verbalizes/displays adequate comfort level or baseline comfort level  Description: Interventions:  - Encourage patient to monitor pain and request assistance  - Assess pain using appropriate pain scale  - Administer analgesics based on type and severity of pain and evaluate response  - Implement non-pharmacological measures as appropriate and evaluate response  - Consider cultural and social influences on pain and pain management  - Notify physician/advanced practitioner if interventions unsuccessful or patient reports new pain  4/25/2021 1116 by Jason Rodriguez RN  Outcome: Adequate for Discharge  4/25/2021 1023 by Jason Rodriguez RN  Outcome: Progressing     Problem: INFECTION - ADULT  Goal: Absence or prevention of progression during hospitalization  Description: INTERVENTIONS:  - Assess and monitor for signs and symptoms of infection  - Monitor lab/diagnostic results  - Monitor all insertion sites, i e  indwelling lines, tubes, and drains  - Monitor endotracheal if appropriate and nasal secretions for changes in amount and color  - [place] appropriate cooling/warming therapies per order  - Administer medications as ordered  - Instruct and encourage patient and family to use good hand hygiene technique  - Identify and instruct in appropriate isolation precautions for identified infection/condition  4/25/2021 1116 by Jason Rodriguez RN  Outcome: Adequate for Discharge  4/25/2021 1023 by Jason Rodriguez RN  Outcome: Progressing  Goal: Absence of fever/infection during neutropenic period  Description: INTERVENTIONS:  - Monitor WBC    4/25/2021 1116 by Jason Rodriguez RN  Outcome: Adequate for Discharge  4/25/2021 1023 by Jason Rodriguez RN  Outcome: Progressing     Problem: SAFETY ADULT  Goal: Patient will remain free of falls  Description: INTERVENTIONS:  - Assess patient frequently for physical needs  -  Identify cognitive and physical deficits and behaviors that affect risk of falls    -  [place] fall precautions as indicated by assessment   - Educate patient/family on patient safety including physical limitations  - Instruct patient to call for assistance with activity based on assessment  - Modify environment to reduce risk of injury  - Consider OT/PT consult to assist with strengthening/mobility  4/25/2021 1116 by Jason Rodriguez RN  Outcome: Adequate for Discharge  4/25/2021 1023 by Jason Rodriguez RN  Outcome: Progressing  Goal: Maintain or return to baseline ADL function  Description: INTERVENTIONS:  -  Assess patient's ability to carry out ADLs; assess patient's baseline for ADL function and identify physical deficits which impact ability to perform ADLs (bathing, care of mouth/teeth, toileting, grooming, dressing, etc )  - Assess/evaluate cause of self-care deficits   - Assess range of motion  - Assess patient's mobility; develop plan if impaired  - Assess patient's need for assistive devices and provide as appropriate  - Encourage maximum independence but intervene and supervise when necessary  - Involve family in performance of ADLs  - Assess for home care needs following discharge   - Consider OT consult to assist with ADL evaluation and planning for discharge  - Provide patient education as appropriate  4/25/2021 1116 by Rancho Franco RN  Outcome: Adequate for Discharge  4/25/2021 1023 by Rancho Franco RN  Outcome: Progressing  Goal: Maintain or return mobility status to optimal level  Description: INTERVENTIONS:  - Assess patient's baseline mobility status (ambulation, transfers, stairs, etc )    - Identify cognitive and physical deficits and behaviors that affect mobility  - Identify mobility aids required to assist with transfers and/or ambulation (gait belt, sit-to-stand, lift, walker, cane, etc )  - [place] fall precautions as indicated by assessment  - Record patient progress and toleration of activity level on Mobility SBAR; progress patient to next Phase/Stage  - Instruct patient to call for assistance with activity based on assessment  - Consider rehabilitation consult to assist with strengthening/weightbearing, etc   4/25/2021 1116 by Rancho Franco RN  Outcome: Adequate for Discharge  4/25/2021 1023 by Rancho Franco RN  Outcome: Progressing     Problem: DISCHARGE PLANNING  Goal: Discharge to home or other facility with appropriate resources  Description: INTERVENTIONS:  - Identify barriers to discharge w/patient and caregiver  - Arrange for needed discharge resources and transportation as appropriate  - Identify discharge learning needs (meds, wound care, etc )  - Arrange for interpretive services to assist at discharge as needed  - Refer to Case Management Department for coordinating discharge planning if the patient needs post-hospital services based on physician/advanced practitioner order or complex needs related to functional status, cognitive ability, or social support system  4/25/2021 1116 by Christina Arellano RN  Outcome: Adequate for Discharge  4/25/2021 1023 by Christina Arellano RN  Outcome: Progressing     Problem: Knowledge Deficit  Goal: Patient/family/caregiver demonstrates understanding of disease process, treatment plan, medications, and discharge instructions  Description: Complete learning assessment and assess knowledge base    Interventions:  - Provide teaching at level of understanding  - Provide teaching via preferred learning methods  4/25/2021 1116 by Christina Arellano RN  Outcome: Adequate for Discharge  4/25/2021 1023 by Christina Arellano RN  Outcome: Progressing

## 2021-04-25 NOTE — DISCHARGE SUMMARY
Discharge Summary - [facility] Internal Medicine  Patient: Devra Goldmann 64 y o  female   MRN: [id number]  PCP: Laura Mckenzie MD  Unit/Bed#: S -01 Encounter: [id number]            Discharging Physician / Practitioner: Frida Garcia MD  PCP: Laura Mckenzie MD  Admission Date:   Admission Orders (From admission, onward)     Ordered        04/23/21 1728  Inpatient Admission  Once         04/23/21 0127  Place in Observation  Once                   Discharge Date: 04/25/21      Reason for Admission:  Diarrhea and weakness    Discharge Diagnoses:     Principal Problem:    COVID-19 virus infection    Active Problems:    Diarrhea    Elevated troponin    Morbid obesity    Essential hypertension    Hypokalemia      Consultations During Hospital Stay:  · None      Hospital Course:     * COVID-19 virus infection  Assessment & Plan  Initially presented with complaints of diarrhea and weakness/fatigue and family/contact with COVID (grandson)  Found to be COVID positive oxygenating on room air  Admitted under the mild pathway - initiated on a Remdesivir course - c/w vitamin C/D and zinc/multivitamin supplementation  Inflammatory markers noted  Serum procalcitonin is negative - off antibiotics  Encourage self-proning as tolerated  Currently oxygenating on room air - no need for corticosteroids  Supportive care otherwise - plan for discharge home today      Diarrhea  Assessment & Plan  Presented w/ complaints of nonbloody diarrhea over the last several days  Stool culture and Clostridium difficile PCR negative - okay to use PRN Imodium symptomatic relief   Given IV fluids for hydration - monitored/repleted electrolyte deficiencies if present   Supportive care  Avoid stool softeners and laxatives     Elevated troponin  Assessment & Plan  Troponin peak of 0 05  Likely a non-MI troponin elevation in the setting of infection  Serial EKGs unremarkable     Morbid obesity  Assessment & Plan  BMI of 45 66  Lifestyle/diet modifications     Essential hypertension  Assessment & Plan  Low-sodium diet  Continue Norvasc/Maxzide/Toprol-XL     Hypokalemia  Assessment & Plan  Possibly due to insensible losses from diarrhea  Monitored/repleted serum potassium and magnesium - script for short course of potassium given on discharge       Condition at Discharge: fair       Discharge Day Visit / Exam:     Vitals: Blood Pressure: 124/60 (04/25/21 0741)  Pulse: 75 (04/25/21 0741)  Temperature: 98 4 °F (36 9 °C) (04/25/21 0741)  Temp Source: Oral (04/25/21 0741)  Respirations: 16 (04/25/21 0741)  Height: 5' 4" (162 6 cm) (04/23/21 0320)  Weight - Scale: 121 kg (266 lb) (04/23/21 0320)  SpO2: 94 % (04/25/21 0741)      Physical exam - I had a face-to-face encounter with the patient on day of discharge  Discussion with Patient and/or Family:  The patient has been advised to return to the ER immediately if any symptoms recur or worsen  Discharge instructions/Information to Patient and/or Family:   See after visit summary for information provided to patient and/or family  Provisions for Follow-Up Care:  See after visit summary for information related to follow-up care and any pertinent home health orders  Disposition:   Home      Discharge Medications:  See after visit summary for reconciled discharge medications provided to patient and/or family  Discharge Statement:  I spent 38 minutes discharging the patient  This time was spent on the day of discharge  I had direct contact with the patient on the day of discharge  Greater than 50% of the total time was spent examining patient, answering all patient questions, arranging and discussing plan of care with patient as well as directly providing post-discharge instructions  Additional time then spent on discharge activities  ** Please Note: This note is constructed using a voice recognition dictation system    An occasional wrong word/phrase or sound-a-like substitution may have been picked up by dictation device due to the inherent limitations of voice recognition software  Read the chart carefully and recognize, using reasonable context, where substitutions may have occurred  **

## 2021-04-25 NOTE — DISCHARGE INSTRUCTIONS

## 2021-04-25 NOTE — PLAN OF CARE
Problem: Nutrition/Hydration-ADULT  Goal: Nutrient/Hydration intake appropriate for improving, restoring or maintaining nutritional needs  Description: Monitor and assess patient's nutrition/hydration status for malnutrition  Collaborate with interdisciplinary team and initiate plan and interventions as ordered  Monitor patient's weight and dietary intake as ordered or per policy  Utilize nutrition screening tool and intervene as necessary  Determine patient's food preferences and provide high-protein, high-caloric foods as appropriate       INTERVENTIONS:  - Monitor oral intake, urinary output, labs, and treatment plans  - Assess nutrition and hydration status and recommend course of action  - Evaluate amount of meals eaten  - Assist patient with eating if necessary   - Allow adequate time for meals  - Recommend/ encourage appropriate diets, oral nutritional supplements, and vitamin/mineral supplements  - Order, calculate, and assess calorie counts as needed  - Recommend, monitor, and adjust tube feedings and TPN/PPN based on assessed needs  - Assess need for intravenous fluids  - Provide specific nutrition/hydration education as appropriate  - Include patient/family/caregiver in decisions related to nutrition  Outcome: Progressing     Problem: PAIN - ADULT  Goal: Verbalizes/displays adequate comfort level or baseline comfort level  Description: Interventions:  - Encourage patient to monitor pain and request assistance  - Assess pain using appropriate pain scale  - Administer analgesics based on type and severity of pain and evaluate response  - Implement non-pharmacological measures as appropriate and evaluate response  - Consider cultural and social influences on pain and pain management  - Notify physician/advanced practitioner if interventions unsuccessful or patient reports new pain  Outcome: Progressing     Problem: INFECTION - ADULT  Goal: Absence or prevention of progression during hospitalization  Description: INTERVENTIONS:  - Assess and monitor for signs and symptoms of infection  - Monitor lab/diagnostic results  - Monitor all insertion sites, i e  indwelling lines, tubes, and drains  - Monitor endotracheal if appropriate and nasal secretions for changes in amount and color  - [place] appropriate cooling/warming therapies per order  - Administer medications as ordered  - Instruct and encourage patient and family to use good hand hygiene technique  - Identify and instruct in appropriate isolation precautions for identified infection/condition  Outcome: Progressing  Goal: Absence of fever/infection during neutropenic period  Description: INTERVENTIONS:  - Monitor WBC    Outcome: Progressing     Problem: SAFETY ADULT  Goal: Patient will remain free of falls  Description: INTERVENTIONS:  - Assess patient frequently for physical needs  -  Identify cognitive and physical deficits and behaviors that affect risk of falls    -  [place] fall precautions as indicated by assessment   - Educate patient/family on patient safety including physical limitations  - Instruct patient to call for assistance with activity based on assessment  - Modify environment to reduce risk of injury  - Consider OT/PT consult to assist with strengthening/mobility  Outcome: Progressing  Goal: Maintain or return to baseline ADL function  Description: INTERVENTIONS:  -  Assess patient's ability to carry out ADLs; assess patient's baseline for ADL function and identify physical deficits which impact ability to perform ADLs (bathing, care of mouth/teeth, toileting, grooming, dressing, etc )  - Assess/evaluate cause of self-care deficits   - Assess range of motion  - Assess patient's mobility; develop plan if impaired  - Assess patient's need for assistive devices and provide as appropriate  - Encourage maximum independence but intervene and supervise when necessary  - Involve family in performance of ADLs  - Assess for home care needs following discharge   - Consider OT consult to assist with ADL evaluation and planning for discharge  - Provide patient education as appropriate  Outcome: Progressing  Goal: Maintain or return mobility status to optimal level  Description: INTERVENTIONS:  - Assess patient's baseline mobility status (ambulation, transfers, stairs, etc )    - Identify cognitive and physical deficits and behaviors that affect mobility  - Identify mobility aids required to assist with transfers and/or ambulation (gait belt, sit-to-stand, lift, walker, cane, etc )  - [place] fall precautions as indicated by assessment  - Record patient progress and toleration of activity level on Mobility SBAR; progress patient to next Phase/Stage  - Instruct patient to call for assistance with activity based on assessment  - Consider rehabilitation consult to assist with strengthening/weightbearing, etc   Outcome: Progressing     Problem: DISCHARGE PLANNING  Goal: Discharge to home or other facility with appropriate resources  Description: INTERVENTIONS:  - Identify barriers to discharge w/patient and caregiver  - Arrange for needed discharge resources and transportation as appropriate  - Identify discharge learning needs (meds, wound care, etc )  - Arrange for interpretive services to assist at discharge as needed  - Refer to Case Management Department for coordinating discharge planning if the patient needs post-hospital services based on physician/advanced practitioner order or complex needs related to functional status, cognitive ability, or social support system  Outcome: Progressing     Problem: Knowledge Deficit  Goal: Patient/family/caregiver demonstrates understanding of disease process, treatment plan, medications, and discharge instructions  Description: Complete learning assessment and assess knowledge base    Interventions:  - Provide teaching at level of understanding  - Provide teaching via preferred learning methods  Outcome: Progressing

## 2021-04-26 ENCOUNTER — TELEPHONE (OUTPATIENT)
Dept: FAMILY MEDICINE CLINIC | Facility: CLINIC | Age: 61
End: 2021-04-26

## 2021-04-26 ENCOUNTER — TRANSITIONAL CARE MANAGEMENT (OUTPATIENT)
Dept: FAMILY MEDICINE CLINIC | Facility: CLINIC | Age: 61
End: 2021-04-26

## 2021-04-26 NOTE — UTILIZATION REVIEW
Initial Clinical Review  OBSERVATION ADMISSION 4/23/2021 0127 CONVERTED TO INPAITENT ADMISSION 4/23/2021 1728 DUE TO COVID 19 VIRUS INFECTION REQ IV MEDICATIONS PER mild covid TREATMENT PLAN & IVF FOR CONT DIARRHEA  Admission: Date/Time/Statement:  Admission Orders (From admission, onward)     Ordered        04/23/21 1728  Inpatient Admission  Once         04/23/21 0127  Place in Observation  Once                   04/23/21 1729  Inpatient Admission Once     Question Answer Comment   Level of Care Med Surg    Estimated length of stay More than 2 Midnights    Certification I certify that inpatient services are medically necessary for this patient for a duration of greater than two midnights  See H&P and MD Progress Notes for additional information about the patient's course of treatment  04/23/21 1728     ED Arrival Information     Expected Arrival Acuity Means of Arrival Escorted By Service Admission Type    - 4/22/2021 22:46 Urgent Wheelchair Spouse Hospitalist Urgent    Arrival Complaint    COVID+/DIARRHEA/WEAKNESS        Chief Complaint   Patient presents with    Diarrhea     pt is covid + and has had diahrrea for a week and now feels very weak       Initial Presentation: 65 yo female obesity, HTN, and GERD, known COVID exposure to ED from home presents with 7 days of fatigue, dry cough, chills w intermittent HA now states she is experiencing non bloody diarrhea for past 5 days  In ED COVID 19 pcr= positive  Cont MILD COVID pathway; IV Remdesivir total 5 days; vitamin therapy; monitor saturations on room air, cont IVF & monitor electrolytes  Daily CBC, CMP, inflammatory markers, trend trop & BPs  Date: 4/23   Day 2: CONT W COVID MILD TREATMENT PATHWAY  Cont w diarrhea w diarrhea; CDIFF PCR= negative; start imodium  Cont IVF, IV Remdesivir, monitor daily labs, O2 saturations   In room air     ED Triage Vitals   Temperature Pulse Respirations Blood Pressure SpO2   04/22/21 2251 04/22/21 2251 04/22/21 2251 04/22/21 2253 04/22/21 2251   97 7 °F (36 5 °C) 96 18 153/72 95 %      Temp Source Heart Rate Source Patient Position - Orthostatic VS BP Location FiO2 (%)   04/23/21 0320 04/22/21 2251 04/22/21 2251 04/22/21 2251 --   Oral Monitor Sitting Left arm       Pain Score       04/22/21 2251       No Pain          Wt Readings from Last 1 Encounters:   04/23/21 121 kg (266 lb)     Additional Vital Signs:   Date/Time  Temp  Pulse  Resp  BP  MAP (mmHg)  SpO2  O2 Device  Patient Position - Orthostatic VS   04/23/21 1514  99 1 °F (37 3 °C)  79  16  139/68  97  94 %  None (Room air)  Lying       Date/Time  Temp  Pulse  Resp  BP  MAP (mmHg)  SpO2  O2 Device  Patient Position - Orthostatic VS   04/23/21 0700  99 4 °F (37 4 °C)  99  18  147/76  --  93 %  None (Room air)  Lying   04/23/21 0320  99 4 °F (37 4 °C)  84  18  146/68  98  95 %  None (Room air)  Lying   04/23/21 0100  --  76  18  122/62  86  93 %  None (Room air)  Lying   04/23/21 0000  --  86  18  137/73  99  94 %  None (Room air)  Lying   04/22/21 2340  --  --  --  --  --  --  None (Room air)  --   04/22/21 2253  --  --  --  153/72  --  --  --  --   04/22/21 2251  97 7 °F (36 5 °C)  96  18  --  --  95 %  None (Room air)  Sitting      Weights (last 14 days)    Date/Time  Weight  Weight Method  Height   04/23/21 0320  121 kg (266 lb)  Standing scale  5' 4" (1 626 m)     Pertinent Labs/Diagnostic Test Results:   Results from last 7 days   Lab Units 04/22/21  2353   SARS-COV-2  Positive*     Results from last 7 days   Lab Units 04/25/21  0647 04/24/21  0514 04/23/21  0552 04/22/21  2336   WBC Thousand/uL 4 41 3 78* 4 10* 4 94   HEMOGLOBIN g/dL 13 8 14 1 14 2 15 7*   HEMATOCRIT % 42 5 42 5 42 7 47 6*   PLATELETS Thousands/uL 191 175 175  166 198   NEUTROS ABS Thousands/µL 2 52 2 00 2 60 3 28         Results from last 7 days   Lab Units 04/25/21  0647 04/24/21  0514 04/23/21  0552 04/22/21  2336   SODIUM mmol/L 138 137 137 135*   POTASSIUM mmol/L 3 4* 3 5 3 2* 3 5   CHLORIDE mmol/L 106 105 103 101   CO2 mmol/L 22 23 24 23   ANION GAP mmol/L 10 9 10 11   BUN mg/dL 13 16 15 18   CREATININE mg/dL 0 84 0 94 0 92 1 14   EGFR ml/min/1 73sq m 75 66 67 52   CALCIUM mg/dL 8 0* 8 1* 7 8* 8 6     Results from last 7 days   Lab Units 04/24/21  0514 04/23/21  0552 04/22/21  2336   AST U/L 44 43 47*   ALT U/L 44 45 53   ALK PHOS U/L 46 47 55   TOTAL PROTEIN g/dL 6 8 6 7 7 9   ALBUMIN g/dL 3 0* 3 3* 3 7   TOTAL BILIRUBIN mg/dL 0 37 0 38 0 37         Results from last 7 days   Lab Units 04/25/21  0647 04/24/21  0514 04/23/21  0552 04/22/21  2336   GLUCOSE RANDOM mg/dL 84 93 104 111         Results from last 7 days   Lab Units 04/22/21  2336   HEMOGLOBIN A1C % 5 9*   EAG mg/dl 123     No results found for: BETA-HYDROXYBUTYRATE               Results from last 7 days   Lab Units 04/25/21  0647 04/22/21  2336   CK TOTAL U/L 193* 209*   CK MB INDEX % <1 0 <1 0   CK MB ng/mL 1 5 0 6     Results from last 7 days   Lab Units 04/23/21  0553 04/23/21  0247 04/22/21  2353   TROPONIN I ng/mL 0 03 0 05* 0 05*     Results from last 7 days   Lab Units 04/25/21  0647 04/22/21  2353   D-DIMER QUANTITATIVE ug/ml FEU 0 41 0 55*     Results from last 7 days   Lab Units 04/22/21  2353   PROTIME seconds 12 3   INR  0 91   PTT seconds 32         Results from last 7 days   Lab Units 04/24/21  0514 04/23/21  0552 04/23/21  0247   PROCALCITONIN ng/ml <0 05 <0 05 <0 05     Results from last 7 days   Lab Units 04/22/21  2353   LACTIC ACID mmol/L 1 3             Results from last 7 days   Lab Units 04/22/21  2336   NT-PRO BNP pg/mL 129*     Results from last 7 days   Lab Units 04/25/21  0647 04/23/21  0323   FERRITIN ng/mL 747* 550*         Results from last 7 days   Lab Units 04/22/21  2336   LIPASE u/L 777*     Results from last 7 days   Lab Units 04/25/21  0647 04/22/21  2336   CRP mg/L 7 0* 6 2*     XR chest 2 views   Final Result by  MD (04/23 0347)      No acute cardiopulmonary disease     4/23 ekg nsr  ED Treatment: Medication Administration from 04/22/2021 2245 to 04/23/2021 9317       Date/Time Order Dose Route Action     04/23/2021 0021 multi-electrolyte (ISOLYTE-S PH 7 4) bolus 1,000 mL 1,000 mL Intravenous New Bag     04/22/2021 2353 ondansetron (ZOFRAN) injection 4 mg 4 mg Intravenous Not Given     04/22/2021 2353 acetaminophen (TYLENOL) tablet 650 mg 650 mg Oral Given     04/23/2021 0247 ascorbic acid (VITAMIN C) tablet 1,000 mg 1,000 mg Oral Given     04/23/2021 0248 sodium chloride 0 9 % infusion 50 mL/hr Intravenous New Bag        Past Medical History:   Diagnosis Date    GERD (gastroesophageal reflux disease)     No medications    History of cervical dysplasia     History cryosurgery    Hypertension     Mitral regurgitation     Obesity     Postmenopausal bleeding     Rash     On left upper thigh and under abdominal fold- is seen by dermatologist    Stress incontinence     Thrombophlebitis Early 1990's    Superficial, right calf, from birth control pill    Wears glasses      Present on Admission:   Essential hypertension   Elevated troponin      Admitting Diagnosis: Diarrhea [R19 7]  Elevated troponin [R77 8]  COVID-19 [U07 1]  Age/Sex: 64 y o  female  Admission Orders:  Contact & airborne isolation  scd  Scheduled Medications:  amLODIPine, 2 5 mg, Oral, QAM  ascorbic acid, 1,000 mg, Oral, Q12H SHARLA  aspirin, 81 mg, Oral, Daily  cholecalciferol, 2,000 Units, Oral, Daily  enoxaparin, 40 mg, Subcutaneous, BID  metoprolol succinate, 100 mg, Oral, QAM  zinc sulfate, 220 mg, Oral, Daily    Followed by  Kayla Colmenares ON 4/30/2021] multivitamin-minerals, 1 tablet, Oral, Daily  ondansetron, 4 mg, Intravenous, Once  [START ON 4/24/2021] remdesivir, 100 mg, Intravenous, Q24H  triamterene-hydrochlorothiazide, 1 tablet, Oral, Daily    Continuous IV Infusions:  sodium chloride, 50 mL/hr, Intravenous, Continuous  PRN Meds:  acetaminophen, 650 mg, Oral, Q6H PRN  ondansetron, 4 mg, Intravenous, Q6H PRN  Network Utilization Review Department  ATTENTION: Please call with any questions or concerns to [phone] and carefully listen to the prompts so that you are directed to the right person  All voicemails are confidential   Candia Siemens all requests for admission clinical reviews, approved or denied determinations and any other requests to dedicated fax number below belonging to the campus where the patient is receiving treatment   List of dedicated fax numbers for the Facilities:  [address] DENIALS (Administrative/Medical Necessity) [phone]   [address] (Maternity/NICU/Pediatrics) [phone]   [address] [place] [address] [address] [address] [facility] [facility] [phone]        Suzi Cruz, RN   Registered Nurse   Specialty:  Utilization Review   Utilization Review   Signed   Date of Service:  4/24/2021  4:57 PM               Signed             Show:Clear all  [x]Manual[x]Template[x]Copied    Added by:  Libra Lambert RN    []Anthony for details  Continued Stay Review     Date: 4/24/2021                           Current Patient Class: inpt               Current Level of Care: med surg   HPI:61 y o  female initially admitted on OBSERVATION ADMISSION 4/23/2021 0127 CONVERTED TO INPAITENT ADMISSION 4/23/2021 1728 DUE TO COVID 19 VIRUS INFECTION REQ IV MEDICATIONS PER mild covid TREATMENT PLAN & IVF FOR CONT DIARRHEA      Assessment/Plan: 4/24 On going MILD covid pathway ; still in room air w low 90s saturations  On IV remdesivir, cont IVF   Room air low 90s saturations  No nursing documentation of diarrhea today    Cont daily labs, monitor wbc- low today, O2 saturations, resp effort       Vital Signs:   Date/Time   Temp   Pulse   Resp   BP   MAP (mmHg)   SpO2   O2 Device   Patient Position - Orthostatic VS   04/24/21 1533   97 4 °F (36 3 °C)Abnormal    77   16   112/61   81   92 %   None (Room air)   Lying   04/24/21 0700   98 1 °F (36 7 °C)   86   16   127/66   --   92 %   None (Room air)   Sitting   04/23/21 2212   97 6 °F (36 4 °C)   83   16   123/56   80   95 %   None (Room air)   Lying            Pertinent Labs/Diagnostic Results:        Results from last 7 days   Lab Units 04/22/21  2353   SARS-COV-2   Positive*             Results from last 7 days   Lab Units 04/24/21  0514 04/23/21  0552 04/22/21  2336   WBC Thousand/uL 3 78* 4 10* 4 94   HEMOGLOBIN g/dL 14 1 14 2 15 7*   HEMATOCRIT % 42 5 42 7 47 6*   PLATELETS Thousands/uL 175 175  166 198   NEUTROS ABS Thousands/µL 2 00 2 60 3 28                 Results from last 7 days   Lab Units 04/24/21  0514 04/23/21  0552 04/22/21  2336   SODIUM mmol/L 137 137 135*   POTASSIUM mmol/L 3 5 3 2* 3 5   CHLORIDE mmol/L 105 103 101   CO2 mmol/L 23 24 23   ANION GAP mmol/L 9 10 11   BUN mg/dL 16 15 18   CREATININE mg/dL 0 94 0 92 1 14   EGFR ml/min/1 73sq m 66 67 52   CALCIUM mg/dL 8 1* 7 8* 8 6             Results from last 7 days   Lab Units 04/24/21  0514 04/23/21  0552 04/22/21  2336   AST U/L 44 43 47*   ALT U/L 44 45 53   ALK PHOS U/L 46 47 55   TOTAL PROTEIN g/dL 6 8 6 7 7 9   ALBUMIN g/dL 3 0* 3 3* 3 7   TOTAL BILIRUBIN mg/dL 0 37 0 38 0 37                 Results from last 7 days   Lab Units 04/24/21  0514 04/23/21  0552 04/22/21  2336   GLUCOSE RANDOM mg/dL 93 104 111               Results from last 7 days   Lab Units 04/22/21  0186   HEMOGLOBIN A1C % 5 9*   EAG mg/dl 123      No results found for: BETA-HYDROXYBUTYRATE Results from last 7 days   Lab Units 04/22/21  2336   CK TOTAL U/L 209*   CK MB INDEX % <1 0   CK MB ng/mL 0 6             Results from last 7 days   Lab Units 04/23/21  0553 04/23/21  0247 04/22/21  2353   TROPONIN I ng/mL 0 03 0 05* 0 05*           Results from last 7 days   Lab Units 04/22/21  2353   D-DIMER QUANTITATIVE ug/ml FEU 0 55*           Results from last 7 days   Lab Units 04/22/21  2353   PROTIME seconds 12 3   INR   0 91   PTT seconds 32                 Results from last 7 days   Lab Units 04/24/21  0514 04/23/21  0552 04/23/21  0247   PROCALCITONIN ng/ml <0 05 <0 05 <0 05      Results from last 7 days   Lab Units 04/22/21  2353   LACTIC ACID mmol/L 1 3                   Results from last 7 days   Lab Units 04/22/21  2336   NT-PRO BNP pg/mL 129*           Results from last 7 days   Lab Units 04/23/21  0323   FERRITIN ng/mL 550*               Results from last 7 days   Lab Units 04/22/21  2336   LIPASE u/L 777*           Results from last 7 days   Lab Units 04/22/21  2336   CRP mg/L 6 2*                                   Results from last 7 days   Lab Units 04/23/21  0858   C DIFF TOXIN B BY PCR   Negative           Results from last 7 days   Lab Units 04/23/21  0858   SALMONELLA SP PCR   None Detected   SHIGELLA SP/ENTEROINVASIVE E  COLI (EIEC)   None Detected   CAMPYLOBACTER SP (JEJUNI AND COLI)   None Detected   SHIGA TOXIN 1/SHIGA TOXIN 2   None Detected      Medications:   Scheduled Medications:  amLODIPine, 2 5 mg, Oral, QAM  ascorbic acid, 1,000 mg, Oral, Q12H [facility]  aspirin, 81 mg, Oral, Daily  cholecalciferol, 2,000 Units, Oral, Daily  enoxaparin, 40 mg, Subcutaneous, BID  guaiFENesin, 600 mg, Oral, Q12H SHARLA  metoprolol succinate, 100 mg, Oral, QAM  zinc sulfate, 220 mg, Oral, Daily    Followed by  Jodi Duque ON 4/30/2021] multivitamin-minerals, 1 tablet, Oral, Daily  ondansetron, 4 mg, Intravenous, Once  remdesivir, 100 mg, Intravenous, Q24H  triamterene-hydrochlorothiazide, 1 tablet, Oral, Daily     Continuous IV Infusions:  sodium chloride, 100 mL/hr, Intravenous, Continuous        PRN Meds:  acetaminophen, 650 mg, Oral, Q6H PRN  benzonatate, 100 mg, Oral, TID PRN  loperamide, 2 mg, Oral, TID PRN  ondansetron, 4 mg, Intravenous, Q6H PRN     Discharge Plan: [facility]     Network Utilization Review Department  ATTENTION: Please call with any questions or concerns to [phone] and carefully listen to the prompts so that you are directed to the right person  All voicemails are confidential   [facility] all requests for admission clinical reviews, approved or denied determinations and any other requests to dedicated fax number below belonging to the campus where the patient is receiving treatment   List of dedicated fax numbers for the Facilities:  [address] DENIALS (Administrative/Medical Necessity) [phone]   [address] (Maternity/NICU/Pediatrics) [phone]   [address] [place] [id number] [address] [facility] [facility] [phone]

## 2021-04-26 NOTE — TELEPHONE ENCOUNTER
Left message for patient to call the office  Patient needs a Virtual TCM for this week due to Covid Infection  Please send call back to me

## 2021-04-27 NOTE — UTILIZATION REVIEW
Notification of Discharge   This is a Notification of Discharge from our facility [address]  Please be advised that this patient has been discharge from our facility  Below you will find the admission and discharge date and time including the patients disposition  UTILIZATION REVIEW CONTACT:  Madhu Shearer  Utilization   Network Utilization Review Department  Phone: [phone] x carefully listen to the prompts  All voicemails are confidential   Email: [email or web address]     PHYSICIAN ADVISORY SERVICES:  FOR TUAN-NM-XMBF REVIEW - MEDICAL NECESSITY DENIAL  Phone: [phone]  Fax: [phone]  Email: [email or web address]     PRESENTATION DATE: 4/22/2021 10:57 PM  OBERVATION ADMISSION DATE:  INPATIENT ADMISSION DATE: 4/23/21 1728   DISCHARGE DATE: 4/25/2021  1:02 PM  DISPOSITION: Home/Self Care Home/Self Care      IMPORTANT INFORMATION:  Send all requests for admission clinical reviews, approved or denied determinations and any other requests to dedicated fax number below belonging to the campus where the patient is receiving treatment   List of dedicated fax numbers:  [address] DENIALS (Administrative/Medical Necessity) [phone]   1000  [facility] (Maternity/NICU/Pediatrics) [phone]   Debby Malcolm [phone]   [facility] [phone]   [facility]  [phone]   Ayse Seals [address] [phone]   [facility]  [phone]   [address] [facility] [address]  24[address] [facility] 1000 [address] [facility] [phone]

## 2021-04-27 NOTE — TELEPHONE ENCOUNTER
Auto refill request, does patient need refills? Please instruct patient to call or send FOODITYt request for refills instead of calling the pharmacy  Ideally patients would be having their medications refills at an office visit   I am no longer refilling medications through pharmacy request     Thank you

## 2021-04-30 RX ORDER — AMLODIPINE BESYLATE 2.5 MG/1
TABLET ORAL
Qty: 90 TABLET | Refills: 0 | OUTPATIENT
Start: 2021-04-30

## 2021-05-20 DIAGNOSIS — I10 ESSENTIAL HYPERTENSION: ICD-10-CM

## 2021-05-20 RX ORDER — AMLODIPINE BESYLATE 2.5 MG/1
TABLET ORAL
Qty: 90 TABLET | Refills: 0 | Status: SHIPPED | OUTPATIENT
Start: 2021-05-20 | End: 2021-08-16

## 2021-08-16 DIAGNOSIS — I10 ESSENTIAL HYPERTENSION: ICD-10-CM

## 2021-08-16 RX ORDER — TRIAMTERENE AND HYDROCHLOROTHIAZIDE 37.5; 25 MG/1; MG/1
TABLET ORAL
Qty: 90 TABLET | Refills: 1 | Status: SHIPPED | OUTPATIENT
Start: 2021-08-16

## 2021-08-16 RX ORDER — AMLODIPINE BESYLATE 2.5 MG/1
TABLET ORAL
Qty: 90 TABLET | Refills: 0 | Status: SHIPPED | OUTPATIENT
Start: 2021-08-16 | End: 2021-09-03 | Stop reason: SDUPTHER

## 2021-08-18 DIAGNOSIS — I10 ESSENTIAL HYPERTENSION: ICD-10-CM

## 2021-08-18 RX ORDER — METOPROLOL SUCCINATE 100 MG/1
TABLET, EXTENDED RELEASE ORAL
Qty: 90 TABLET | Refills: 1 | Status: SHIPPED | OUTPATIENT
Start: 2021-08-18 | End: 2021-09-03 | Stop reason: SDUPTHER

## 2021-09-03 DIAGNOSIS — I10 ESSENTIAL HYPERTENSION: ICD-10-CM

## 2021-09-03 RX ORDER — METOPROLOL SUCCINATE 100 MG/1
100 TABLET, EXTENDED RELEASE ORAL EVERY MORNING
Qty: 90 TABLET | Refills: 0 | Status: SHIPPED | OUTPATIENT
Start: 2021-09-03 | End: 2021-11-28 | Stop reason: SDUPTHER

## 2021-09-03 RX ORDER — AMLODIPINE BESYLATE 2.5 MG/1
2.5 TABLET ORAL EVERY MORNING
Qty: 90 TABLET | Refills: 0 | Status: SHIPPED | OUTPATIENT
Start: 2021-09-03 | End: 2021-11-28 | Stop reason: SDUPTHER

## 2021-10-29 ENCOUNTER — VBI (OUTPATIENT)
Dept: ADMINISTRATIVE | Facility: OTHER | Age: 61
End: 2021-10-29

## 2021-11-01 ENCOUNTER — HOSPITAL ENCOUNTER (OUTPATIENT)
Dept: RADIOLOGY | Age: 61
Discharge: HOME | End: 2021-11-01
Payer: COMMERCIAL

## 2021-11-01 VITALS — WEIGHT: 260 LBS | HEIGHT: 64 IN | BODY MASS INDEX: 44.39 KG/M2

## 2021-11-01 DIAGNOSIS — Z12.31 ENCOUNTER FOR SCREENING MAMMOGRAM FOR BREAST CANCER: ICD-10-CM

## 2021-11-01 PROCEDURE — 77063 BREAST TOMOSYNTHESIS BI: CPT

## 2021-11-01 PROCEDURE — 77067 SCR MAMMO BI INCL CAD: CPT

## 2021-11-08 ENCOUNTER — HOSPITAL ENCOUNTER (OUTPATIENT)
Dept: ULTRASOUND IMAGING | Facility: CLINIC | Age: 61
Discharge: HOME | End: 2021-11-08
Payer: COMMERCIAL

## 2021-11-08 DIAGNOSIS — R92.8 ABNORMAL SCREENING MAMMOGRAM: ICD-10-CM

## 2021-11-08 PROCEDURE — 76642 ULTRASOUND BREAST LIMITED: CPT

## 2021-12-06 ENCOUNTER — RA CDI HCC (OUTPATIENT)
Dept: OTHER | Facility: HOSPITAL | Age: 61
End: 2021-12-06

## 2022-05-19 NOTE — TELEPHONE ENCOUNTER
If she was wearing appropriate PPE (mask), likely low risk, so would just monitor for symptoms  If she is asymptomatic doesn't need testing   She can still go to work, can be around family members and coworkers, but has to follow strict guidelines including social distancing (>6 feet), hand washing with soap,  wearing masks around patients if she is having symptoms, etc  If she develops fevers or symptoms, she should call us back Provider Procedure Text (E): After obtaining clear surgical margins the defect was repaired by another provider.

## 2023-05-16 PROBLEM — U07.1 COVID-19 VIRUS INFECTION: Status: RESOLVED | Noted: 2021-04-23 | Resolved: 2023-05-16

## 2023-05-16 PROBLEM — R19.7 DIARRHEA: Status: RESOLVED | Noted: 2021-04-23 | Resolved: 2023-05-16

## 2023-05-16 PROBLEM — R79.89 ELEVATED TROPONIN: Status: RESOLVED | Noted: 2021-04-23 | Resolved: 2023-05-16

## 2023-05-16 PROBLEM — R77.8 ELEVATED TROPONIN: Status: RESOLVED | Noted: 2021-04-23 | Resolved: 2023-05-16

## 2023-05-17 ENCOUNTER — OFFICE VISIT (OUTPATIENT)
Dept: FAMILY MEDICINE CLINIC | Facility: CLINIC | Age: 63
End: 2023-05-17

## 2023-05-17 VITALS
TEMPERATURE: 97.6 F | BODY MASS INDEX: 44.9 KG/M2 | HEART RATE: 78 BPM | DIASTOLIC BLOOD PRESSURE: 92 MMHG | OXYGEN SATURATION: 98 % | RESPIRATION RATE: 18 BRPM | HEIGHT: 64 IN | WEIGHT: 263 LBS | SYSTOLIC BLOOD PRESSURE: 138 MMHG

## 2023-05-17 DIAGNOSIS — E66.01 CLASS 3 SEVERE OBESITY DUE TO EXCESS CALORIES WITHOUT SERIOUS COMORBIDITY WITH BODY MASS INDEX (BMI) OF 45.0 TO 49.9 IN ADULT (HCC): ICD-10-CM

## 2023-05-17 DIAGNOSIS — Z12.31 ENCOUNTER FOR SCREENING MAMMOGRAM FOR BREAST CANCER: ICD-10-CM

## 2023-05-17 DIAGNOSIS — Z00.00 ANNUAL PHYSICAL EXAM: Primary | ICD-10-CM

## 2023-05-17 DIAGNOSIS — G47.33 OBSTRUCTIVE SLEEP APNEA: ICD-10-CM

## 2023-05-17 DIAGNOSIS — Z12.12 SCREENING FOR COLORECTAL CANCER: ICD-10-CM

## 2023-05-17 DIAGNOSIS — Z12.11 SCREENING FOR COLORECTAL CANCER: ICD-10-CM

## 2023-05-17 DIAGNOSIS — R73.03 PRE-DIABETES: ICD-10-CM

## 2023-05-17 DIAGNOSIS — I10 ESSENTIAL HYPERTENSION: ICD-10-CM

## 2023-05-17 RX ORDER — TRIAMTERENE AND HYDROCHLOROTHIAZIDE 37.5; 25 MG/1; MG/1
1 TABLET ORAL DAILY
Qty: 90 TABLET | Refills: 3 | Status: SHIPPED | OUTPATIENT
Start: 2023-05-17

## 2023-05-17 RX ORDER — METOPROLOL SUCCINATE 50 MG/1
50 TABLET, EXTENDED RELEASE ORAL EVERY MORNING
Qty: 90 TABLET | Refills: 3 | Status: SHIPPED | OUTPATIENT
Start: 2023-05-17

## 2023-05-17 NOTE — PROGRESS NOTES
[facility]      Date of Service: 23  Primary Care Provider:   Renuka Malone MD       Name: Juan Luis Mitchell       : 1960       Age:63 y o  Sex: female      MRN: [id number]      Chief Complaint:Physical Exam     Assessment and Plan:  61 y o  female exam      1  Health Maintenance  - Colon Cancer Screening: will order Cologuard  - Cervical Cancer Screening: follows GYN, due for PAP at the end of this month   - Breast Cancer Screening: ordered today   - Labs: as below   - Immunizations: Reviewed  Recommend yearly flu vaccine  2  Other diagnoses addressed today:   Problem List Items Addressed This Visit        Respiratory    Obstructive sleep apnea     Previous positive sleep study, though mild  She does snore at night, so will reorder sleep study          Relevant Orders    Home Study       Cardiovascular and Mediastinum    Essential hypertension     BP Readings from Last 3 Encounters:   23 138/92   21 124/60   21 130/80     Borderline today, will resume medications, though lower dose of metoprolol since heart rate is controlled  Recheck labs in 2 weeks         Relevant Medications    metoprolol succinate (TOPROL-XL) 50 mg 24 hr tablet    triamterene-hydrochlorothiazide (MAXZIDE-25) 37 5-25 mg per tablet    Other Relevant Orders    Home Study       Other    Pre-diabetes     Lab Results   Component Value Date    HGBA1C 5 9 (H) 2021    HGBA1C 6 1 (H) 10/19/2018    HGBA1C 6 1 10/19/2018     Lab Results   Component Value Date    LDLCALC 50 2021    CREATININE 0 84 2021     Counseled patient on the importance of lifestyle interventions, specifically discussed a whole food, plant based diet low in saturated fat and processed foods  Discussed the importance of a diet that is rich in whole grains, fruits and vegetables, beans and legumes            Morbid obesity    Relevant Orders    Home Study   Other Visit Diagnoses     Annual physical exam    - Primary    Relevant Orders    Comprehensive metabolic panel    Hemoglobin A1C    Lipid panel    Encounter for screening mammogram for breast cancer        Relevant Orders    Mammo screening bilateral w 3d & cad    Screening for colorectal cancer        Relevant Orders    Cologuard           Immunizations and preventive care screenings were discussed with patient today  Appropriate education was printed on patient's after visit summary  Counseling:  Alcohol/drug use: discussed moderation in alcohol intake, the recommendations for healthy alcohol use, and avoidance of illicit drug use  Dental Health: discussed importance of regular tooth brushing, flossing, and dental visits  Injury prevention: discussed safety/seat belts, safety helmets, smoke detectors, carbon dioxide detectors    Exercise: the importance of regular exercise/physical activity was discussed  Recommend exercise 3-5 times per week for at least 30 minutes  BMI Counseling: Body mass index is 45 14 kg/m²  The BMI is above normal  Nutrition recommendations include decreasing portion sizes, encouraging healthy choices of fruits and vegetables, consuming healthier snacks, moderation in carbohydrate intake and reducing intake of saturated and trans fat  Exercise recommendations include exercising 3-5 times per week  Rationale for BMI follow-up plan is due to patient being overweight or obese  Depression Screening and Follow-up Plan: Patient was screened for depression during today's encounter  They screened negative with a PHQ-2 score of 0  Follow up next physical in 1 year  Subjective:    Juan Luis Mitchell is a 61 y o  female and is here for a comprehensive physical exam      Acute Complaints:    She has not been seen in over two years and has been out of medications for several months  She does not check her blood pressure at home   She feels fine, gets occasional palpitations which she feels like occurred less often when she was on metoprolol  Diet and Physical Activity  Diet/Nutrition: does follow a well balanced diet  Exercise: no formal exercise  General Health  Vision: no vision problems and goes for regular eye exams  Dental: regular dental visits  Gyn Health:       No LMP recorded   Patient is postmenopausal   Follows GYN    Histories Updated and Reviewed 5/17/2023:  Patient's Medications   New Prescriptions    No medications on file   Previous Medications    BENZONATATE (TESSALON PERLES) 100 MG CAPSULE    Take 1 capsule (100 mg total) by mouth 3 (three) times a day as needed for cough    CHOLECALCIFEROL (VITAMIN D) 2000 UNITS CAPS    Take 1 tablet by mouth daily    IBUPROFEN (MOTRIN) 200 MG TABLET    Take 400 mg by mouth every 6 (six) hours as needed for mild pain    MULTIVITAMIN-MINERALS (CENTRUM ADULTS) TABLET    Take 1 tablet by mouth daily    NYSTATIN (MYCOSTATIN) POWDER    Apply topically 3 (three) times a day   Modified Medications    Modified Medication Previous Medication    METOPROLOL SUCCINATE (TOPROL-XL) 50 MG 24 HR TABLET metoprolol succinate (TOPROL-XL) 100 mg 24 hr tablet       Take 1 tablet (50 mg total) by mouth every morning    Take 1 tablet (100 mg total) by mouth every morning    TRIAMTERENE-HYDROCHLOROTHIAZIDE (MAXZIDE-25) 37 5-25 MG PER TABLET triamterene-hydrochlorothiazide (MAXZIDE-25) 37 5-25 mg per tablet       Take 1 tablet by mouth daily    TAKE 1 TABLET BY MOUTH EVERY DAY   Discontinued Medications    AMLODIPINE (NORVASC) 2 5 MG TABLET    Take 1 tablet (2 5 mg total) by mouth every morning    ASCORBIC ACID (VITAMIN C) 1000 MG TABLET    Take 1 tablet (1,000 mg total) by mouth every 12 (twelve) hours for 8 doses    ASPIRIN (ECOTRIN LOW STRENGTH) 81 MG EC TABLET    Take 1 tablet (81 mg total) by mouth 2 (two) times a day For DVT ppx    POTASSIUM CHLORIDE (K-DUR,KLOR-CON) 20 MEQ TABLET    Take 1 tablet (20 mEq total) by mouth daily for 3 days    ZINC SULFATE (ZINCATE) 220 MG CAPSULE    Take 1 capsule (220 mg total) by mouth daily for 4 doses     No Known Allergies  Past Medical History:   Diagnosis Date   • COVID-19 virus infection 4/23/2021   • GERD (gastroesophageal reflux disease)     No medications   • History of cervical dysplasia     History cryosurgery   • Hypertension    • Mitral regurgitation    • Obesity    • Postmenopausal bleeding    • Rash     On left upper thigh and under abdominal fold- is seen by dermatologist   • Stress incontinence    • Thrombophlebitis Early 1990's    Superficial, right calf, from birth control pill   • Wears glasses      Social History     Socioeconomic History   • Marital status: /Civil Union     Spouse name: Not on file   • Number of children: Not on file   • Years of education: Not on file   • Highest education level: Not on file   Occupational History   • Not on file   Tobacco Use   • Smoking status: Former     Packs/day: 0 75     Years: 20 00     Pack years: 15 00     Types: Cigarettes     Quit date: 2008     Years since quitting: 15 3   • Smokeless tobacco: Never   • Tobacco comments:     1 ppd x15 years    Substance and Sexual Activity   • Alcohol use: Yes     Comment: very rarely   • Drug use: No   • Sexual activity: Not on file   Other Topics Concern   • Not on file   Social History Narrative   • Not on file     Social Determinants of Health     Financial Resource Strain: Not on file   Food Insecurity: Not on file   Transportation Needs: Not on file   Physical Activity: Not on file   Stress: Not on file   Social Connections: Not on file   Intimate Partner Violence: Not on file   Housing Stability: Not on file     Immunization History   Administered Date(s) Administered   • INFLUENZA 10/09/2015, 10/31/2019, 10/06/2021   • Influenza Recombinant Preservative Free Im 10/20/2020   • Influenza, seasonal, injectable 01/01/2014   • Tuberculin Skin Test-PPD Intradermal 10/09/2015       Objective:  /92 (BP Location: Left arm, Patient Position: Sitting, "Cuff Size: Standard)   Pulse 78   Temp 97 6 °F (36 4 °C) (Tympanic)   Resp 18   Ht 5' 4\" (1 626 m)   Wt 119 kg (263 lb)   SpO2 98%   BMI 45 14 kg/m²   BP Readings from Last 3 Encounters:   05/17/23 138/92   04/25/21 124/60   02/22/21 130/80      Wt Readings from Last 3 Encounters:   05/17/23 119 kg (263 lb)   11/01/21 118 kg (260 lb)   04/23/21 121 kg (266 lb)      Physical Exam  Constitutional:       General: She is not in acute distress  Appearance: Normal appearance  She is obese  She is not ill-appearing or toxic-appearing  HENT:      Head: Normocephalic and atraumatic  Right Ear: Tympanic membrane, ear canal and external ear normal       Left Ear: Tympanic membrane, ear canal and external ear normal       Nose: Nose normal       Mouth/Throat:      Mouth: Mucous membranes are moist    Eyes:      Extraocular Movements: Extraocular movements intact  Conjunctiva/sclera: Conjunctivae normal    Cardiovascular:      Rate and Rhythm: Normal rate and regular rhythm  Heart sounds: No murmur heard  No friction rub  No gallop  Pulmonary:      Effort: Pulmonary effort is normal  No respiratory distress  Breath sounds: Normal breath sounds  No stridor  No wheezing, rhonchi or rales  Abdominal:      General: There is no distension  Palpations: Abdomen is soft  Tenderness: There is no abdominal tenderness  There is no guarding or rebound  Musculoskeletal:         General: Normal range of motion  Cervical back: Normal range of motion and neck supple  Right lower leg: No edema  Left lower leg: No edema  Skin:     General: Skin is warm and dry  Findings: No erythema or rash  Neurological:      General: No focal deficit present  Mental Status: She is alert and oriented to person, place, and time     Psychiatric:         Mood and Affect: Mood normal          Behavior: Behavior normal          Patient Care Team:  Ephraim Gudino MD as PCP - General " "(Family Medicine)  Jayda Loyd MD as PCP - PCP-[facility] Chasity Levy MD    Note: Portions of the record may have been created with voice recognition software  Occasional wrong word or \"sound a like\" substitutions may have occurred due to the inherent limitations of voice recognition software  Read the chart carefully and recognize, using context, where substitutions have occurred    "

## 2023-05-17 NOTE — ASSESSMENT & PLAN NOTE
BP Readings from Last 3 Encounters:   05/17/23 138/92   04/25/21 124/60   02/22/21 130/80     Borderline today, will resume medications, though lower dose of metoprolol since heart rate is controlled  Recheck labs in 2 weeks

## 2023-05-17 NOTE — ASSESSMENT & PLAN NOTE
Lab Results   Component Value Date    HGBA1C 5 9 (H) 04/22/2021    HGBA1C 6 1 (H) 10/19/2018    HGBA1C 6 1 10/19/2018     Lab Results   Component Value Date    LDLCALC 50 04/22/2021    CREATININE 0 84 04/25/2021     Counseled patient on the importance of lifestyle interventions, specifically discussed a whole food, plant based diet low in saturated fat and processed foods  Discussed the importance of a diet that is rich in whole grains, fruits and vegetables, beans and legumes

## 2023-05-25 ENCOUNTER — TELEPHONE (OUTPATIENT)
Dept: SLEEP CENTER | Facility: CLINIC | Age: 63
End: 2023-05-25

## 2023-05-25 NOTE — TELEPHONE ENCOUNTER
----- Message from María Elena Granados MD sent at 5/24/2023  8:25 PM EDT -----  approved  ----- Message -----  From: Liberty Ray  Sent: 8/41/2435   6:15 AM EDT  To: Sleep [facility] Provider    This home sleep study needs approval      If approved please sign and return to clerical pool  If denied please include reasons why  Also provide alternative testing if warranted  Please sign and return to clerical pool

## 2023-07-11 ENCOUNTER — HOSPITAL ENCOUNTER (OUTPATIENT)
Dept: SLEEP CENTER | Facility: CLINIC | Age: 63
Discharge: HOME | End: 2023-07-11
Payer: COMMERCIAL

## 2023-07-11 DIAGNOSIS — E66.01 CLASS 3 SEVERE OBESITY DUE TO EXCESS CALORIES WITHOUT SERIOUS COMORBIDITY WITH BODY MASS INDEX (BMI) OF 45.0 TO 49.9 IN ADULT (HCC): ICD-10-CM

## 2023-07-11 DIAGNOSIS — G47.33 OBSTRUCTIVE SLEEP APNEA: ICD-10-CM

## 2023-07-11 DIAGNOSIS — I10 ESSENTIAL HYPERTENSION: ICD-10-CM

## 2023-07-11 PROCEDURE — G0399 HOME SLEEP TEST/TYPE 3 PORTA: HCPCS

## 2023-07-13 NOTE — PROGRESS NOTES
Home Sleep Study Documentation    HOME STUDY DEVICE: Noxturnal no                                           Elizabeth G3 yes      Pre-Sleep Home Study:    Set[facility] and instructions [facility] performed demonstration for Patient: yes    Return demonstration performed by Patient: yes    Written instructions provided to Patient: yes    Patient signed consent form: yes        Post-Sleep Home Study:    Additional comments by Patient: none    Home Sleep Study Failed:no:    Failure reason: N/A    Reported or Detected: N/A    Scored by:  ROS HagenGT

## 2023-07-21 ENCOUNTER — TELEPHONE (OUTPATIENT)
Dept: SLEEP CENTER | Facility: CLINIC | Age: 63
End: 2023-07-21

## 2023-07-21 NOTE — TELEPHONE ENCOUNTER
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 Per study order, Dr. Josefa Liu PCP, requests follow up with sleep specialist.    Scheduled consult 2/12/24 in [facility]. Offered sooner consult in [facility] and [place] in [place] but patient declined as she prefers [facility] location. Sandra Mancia

## 2023-07-28 ENCOUNTER — HOSPITAL ENCOUNTER (OUTPATIENT)
Dept: RADIOLOGY | Age: 63
Discharge: HOME | End: 2023-07-28
Payer: COMMERCIAL

## 2023-07-28 VITALS — HEIGHT: 64 IN | BODY MASS INDEX: 44.9 KG/M2 | WEIGHT: 263 LBS

## 2023-07-28 DIAGNOSIS — Z12.31 ENCOUNTER FOR SCREENING MAMMOGRAM FOR BREAST CANCER: ICD-10-CM

## 2023-07-28 PROCEDURE — 77067 SCR MAMMO BI INCL CAD: CPT

## 2023-07-28 PROCEDURE — 77063 BREAST TOMOSYNTHESIS BI: CPT

## 2023-12-19 ENCOUNTER — OFFICE VISIT (OUTPATIENT)
Dept: SLEEP CENTER | Facility: HOSPITAL | Age: 63
End: 2023-12-19
Payer: COMMERCIAL

## 2023-12-19 VITALS
RESPIRATION RATE: 18 BRPM | SYSTOLIC BLOOD PRESSURE: 138 MMHG | HEIGHT: 64 IN | HEART RATE: 80 BPM | WEIGHT: 269 LBS | BODY MASS INDEX: 45.93 KG/M2 | DIASTOLIC BLOOD PRESSURE: 82 MMHG | OXYGEN SATURATION: 98 %

## 2023-12-19 DIAGNOSIS — I10 ESSENTIAL HYPERTENSION: ICD-10-CM

## 2023-12-19 DIAGNOSIS — G47.33 OBSTRUCTIVE SLEEP APNEA: Primary | ICD-10-CM

## 2023-12-19 DIAGNOSIS — G25.81 RLS (RESTLESS LEGS SYNDROME): ICD-10-CM

## 2023-12-19 DIAGNOSIS — R40.0 DAYTIME SLEEPINESS: ICD-10-CM

## 2023-12-19 DIAGNOSIS — E66.01 CLASS 3 SEVERE OBESITY DUE TO EXCESS CALORIES WITHOUT SERIOUS COMORBIDITY WITH BODY MASS INDEX (BMI) OF 45.0 TO 49.9 IN ADULT (HCC): ICD-10-CM

## 2023-12-19 PROCEDURE — 99204 OFFICE O/P NEW MOD 45 MIN: CPT | Performed by: INTERNAL MEDICINE

## 2023-12-19 NOTE — PROGRESS NOTES
Consultation - Sleep Center   Maria Del Carmen Nice  63 y.o. female  :1960  MRN:[id number]  DOS:2023    Physician Requesting Consult: Meghan Thompson MD             Reason for Consult : At your kind request I saw Maria Del Carmen Nice for initial sleep evaluation today.  Home sleep testing was undertaken to evaluate for sleep disordered breathing and patient is here to review results and further options.     The study demonstrated [a respiratory event index (JONNY) of 8.1.  The lowest SpO2 recorded is 76% and 12.6 minutes during the study was spent with saturations below 90%.      PFSH, Problem List, Medications & Allergies were reviewed in EMR.    Maria Del Carmen  has a past medical history of COVID-19 virus infection (2021), GERD (gastroesophageal reflux disease), History of cervical dysplasia, Hypertension, Mitral regurgitation, Obesity, Postmenopausal bleeding, Rash, Stress incontinence, Thrombophlebitis (Early ), and Wears glasses.      She has a current medication list which includes the following prescription(s): vitamin d, ibuprofen, metoprolol succinate, multivitamin-minerals, triamterene-hydrochlorothiazide, benzonatate, and nystatin.      HPI: Study was undertaken for complaint of constant fatigue expectable of sleep of at least 2 years duration. Maria Del Carmen is aware of snoring and on 1 occasion family reported breathing pauses during sleep.  Other complaints: None. Restless Leg Syndrome: has typical symptoms but occurs infrequently and not disturbing sleep;  Parasomnia: no features reported    Sleep Routine (averaged): Typical Bedtime: 10-11 PM.  Gets OOB: 7 AM. TIB:>8 hrs.   Sleep latency:< 15 minutes Sleep Interruptions: 1 to 2 times per night,  because of nocturia and occasionally may struggle to fall back asleep. Awakens: With aid of an alarm   upon awakening:rarely feels refreshed .[She estimates getting 5 hrs sleep.] Daytime Function:Maria Del Carmen reports excessive daytime sleepiness dozes off when  "sedentary at home. She rated herself at Total score: 9 /24 on the [place] Sleepiness Scale.     Habits:   reports that she quit smoking about 15 years ago. Her smoking use included cigarettes. She started smoking about 35 years ago. She has a 15.0 pack-year smoking history. She has never used smokeless tobacco.;  reports current alcohol use.; Reports no history of drug use.;  E-Cigarette/Vaping; Caffeine use:limited; Exercise routine: none.    Occupation: Work Part Time   Family History: Mother had obstructive sleep apnea and one of her siblings  ROS: Significant for has been stable..     EXAM:  /82   Pulse 80   Resp 18   Ht 5' 4\" (1.626 m)   Wt 122 kg (269 lb)   SpO2 98%   BMI 46.17 kg/m²    General: Well groomed female, well appearing, in no apparent distress.   Neurological: Alert and orientated; cooperative; Cranial nerves intact;    Psychiatric: Speech: Clear and coherent; normal mood, affect & thought   Skin: Warm and dry; Color& Hydration good; no facial rashes or lesions   HEENT:  Craniofacial anatomy: normal Sinuses: Non-tender. TMJ: Normal    Eyes: EOM's intact; conjunctiva/corneas clear   Ears: Appear normal     Nasal Airway: is patent Septum: Intact; Turbinates: Normal; Rhinorrhea: None  Mouth: Lips: Normal posture; Dentition: normal . Mucosa: Moist; Hard Palate:normal    Oropharryx: crowded and AP narrowing Tongue: Mallampati:Class IV, Mobile, and ScallopedSoft Palate:  redundant  Tonsils: absent  Neck: Is thick and excess fatty tissue; neck Circumference: 15 \"; supple; no abnormal masses; Thyroid: Normal. Trachea: Central.    Lymph: No cervical or submandibular Lymhadenopathy  Heart: S1,S2 normal; RRR; no gallop; no murmur   Lungs: Respiratory Effort: Normal. Air entry good bilaterally.  No wheezes.  No rales  Abdomen: Obese, soft & non-tender    Extremities: No pedal edema.  No clubbing or cyanosis.    Musculoskeletal:  Motor normal; Gait: Normal.       Serial BMPs have demonstrated normal " "CO2 otherwise unremarkable; Last CBC was normal.  Ferritin level is 747 ng/mL    IMPRESSION: Primary/Secondary Sleep Diagnoses (to Medical or Psych conditions) & Comorbidities   1. Obstructive sleep apnea  Cpap DME      2. Daytime sleepiness        3. RLS (restless legs syndrome)        4. Essential hypertension        5. Morbid obesity             PLAN:   1. I reviewed results of the Sleep studies with the patient.   2. With respect to above conditions, I counseled on pathophysiology, diagnosis, treatment options, risks and benefits; inter-relationship and effects on symptoms and comorbidities; risks of no treatment; costs and insurance aspects.   3. Patient elected positive airway pressure therapy and care coordinated with the DME provider for set-up.  Pressure setting 5 -15 cm H2O and Understands limitations of auto titrating PAP  4. Need for compliance with therapy and weight reduction were emphasized.  5. Follow-up to be scheduled in 2 months to monitor compliance, progress and to adjust therapy.  Thank you for allowing me to participate in the care of this patient. I will keep you apprised of developments.    Sincerely,      Authenticated electronically on 12/19/23   Board Certified Specialist     Portions of the record may have been created with voice recognition software. Occasional wrong word or \"sound a like\" substitutions may have occurred due to the inherent limitations of voice recognition software. There may also be notations and random deletions of words or characters from malfunctioning software. Read the chart carefully and recognize, using context, where substitutions/deletions have occurred.     "

## 2023-12-19 NOTE — PATIENT INSTRUCTIONS
What is DOTTIE?   Obstructive sleep apnea is a common and serious sleep disorder that causes you to stop breathing during sleep. The airway repeatedly becomes blocked, limiting the amount of air that reaches your lungs. When this happens, you may snore loudly or making choking noises as you try to breathe. Your brain and body becomes oxygen deprived and you may wake up. This may happen a few times a night, or in more severe cases, several hundred times a night.   Sleep apnea can make you wake up in the morning feeling tired or unrefreshed even though you have had a full night of sleep. During the day, you may feel fatigued, have difficulty concentrating or you may even unintentionally fall asleep. This is because your body is waking up numerous times throughout the night, even though you might not be conscious of each awakening.  The lack of oxygen your body receives can have negative long-term consequences for your health. This includes:  High blood pressure  Heart disease  Irregular heart rhythms  Stroke  Pre-diabetes and diabetes  Depression  Testing  An objective evaluation of your sleep may be needed before your board certified sleep physician can make a diagnosis. Options include:   In-lab overnight sleep study  This type of sleep study requires you to stay overnight at a sleep center, in a bed that may resemble a hotel room. You will sleep with sensors hooked up to various parts of your body. These sensors record your brain waves, heartbeat, breathing and movement. An overnight sleep study also provides your doctor with the most complete information about your sleep. Learn more about an overnight sleep study.   Home sleep apnea test  Some patients with high risk factors for obstructive sleep apnea and no other medical disorders may be candidates for a home sleep apnea test. The testing equipment differs in that it is less complicated than what is used in an overnight sleep study. As such, does not give all the  data an in-lab will and if negative, may not mean you do not have the problem.  Treatment for sleep apnea includes using a continuous positive airway pressure (CPAP) machine to keep your airway open during sleep. A mask is placed over your nose and mouth, or just your nose. The mask is hooked to the CPAP machine that blows a gentle stream of air into the mask when you breathe. This helps keep your airway open so you can breathe more regularly. Extra oxygen may be given to you through the machine. You may be given a mouth device. It looks like a mouth guard or dental retainer and stops your tongue and mouth tissues from blocking your throat while you sleep. Surgery may be needed to remove extra tissues that block your mouth, throat, or nose.  Manage sleep apnea:   Do not smoke. Nicotine and other chemicals in cigarettes and cigars can cause lung damage. Ask your healthcare provider for information if you currently smoke and need help to quit. E-cigarettes or smokeless tobacco still contain nicotine. Talk to your healthcare provider before you use these products.  Do not drink alcohol or take sedative medicine before you go to sleep. Alcohol and sedatives can relax the muscles and tissues around your throat. This can block the airflow to your lungs.  Maintain a healthy weight. Excess tissue around your throat may restrict your breathing. Ask your healthcare provider for information if you need to lose weight.  Sleep on your side or use pillows designed to prevent sleep apnea. This prevents your tongue or other tissues from blocking your throat. You can also raise the head of your bed.  Driving Safety. Refrain from driving when drowsy.   Follow up with your healthcare provider as directed: Write down your questions so you remember to ask them during your visits.  Go to AASM website for more information: Sleepeducation.org  What is DOTTIE?   Obstructive sleep apnea is a common and serious sleep disorder that causes you to  stop breathing during sleep. The airway repeatedly becomes blocked, limiting the amount of air that reaches your lungs. When this happens, you may snore loudly or making choking noises as you try to breathe. Your brain and body becomes oxygen deprived and you may wake up. This may happen a few times a night, or in more severe cases, several hundred times a night.   Sleep apnea can make you wake up in the morning feeling tired or unrefreshed even though you have had a full night of sleep. During the day, you may feel fatigued, have difficulty concentrating or you may even unintentionally fall asleep. This is because your body is waking up numerous times throughout the night, even though you might not be conscious of each awakening.  The lack of oxygen your body receives can have negative long-term consequences for your health. This includes:  High blood pressure  Heart disease  Irregular heart rhythms  Stroke  Pre-diabetes and diabetes  Depression  Testing  An objective evaluation of your sleep may be needed before your board certified sleep physician can make a diagnosis. Options include:   In-lab overnight sleep study  This type of sleep study requires you to stay overnight at a sleep center, in a bed that may resemble a hotel room. You will sleep with sensors hooked up to various parts of your body. These sensors record your brain waves, heartbeat, breathing and movement. An overnight sleep study also provides your doctor with the most complete information about your sleep. Learn more about an overnight sleep study.   Home sleep apnea test  Some patients with high risk factors for obstructive sleep apnea and no other medical disorders may be candidates for a home sleep apnea test. The testing equipment differs in that it is less complicated than what is used in an overnight sleep study. As such, does not give all the data an in-lab will and if negative, may not mean you do not have the problem.  Treatment for  sleep apnea includes using a continuous positive airway pressure (CPAP) machine to keep your airway open during sleep. A mask is placed over your nose and mouth, or just your nose. The mask is hooked to the CPAP machine that blows a gentle stream of air into the mask when you breathe. This helps keep your airway open so you can breathe more regularly. Extra oxygen may be given to you through the machine. You may be given a mouth device. It looks like a mouth guard or dental retainer and stops your tongue and mouth tissues from blocking your throat while you sleep. Surgery may be needed to remove extra tissues that block your mouth, throat, or nose.  Manage sleep apnea:   Do not smoke. Nicotine and other chemicals in cigarettes and cigars can cause lung damage. Ask your healthcare provider for information if you currently smoke and need help to quit. E-cigarettes or smokeless tobacco still contain nicotine. Talk to your healthcare provider before you use these products.  Do not drink alcohol or take sedative medicine before you go to sleep. Alcohol and sedatives can relax the muscles and tissues around your throat. This can block the airflow to your lungs.  Maintain a healthy weight. Excess tissue around your throat may restrict your breathing. Ask your healthcare provider for information if you need to lose weight.  Sleep on your side or use pillows designed to prevent sleep apnea. This prevents your tongue or other tissues from blocking your throat. You can also raise the head of your bed.  Driving Safety. Refrain from driving when drowsy.   Follow up with your healthcare provider as directed: Write down your questions so you remember to ask them during your visits.  Go to AASM website for more information: Sleepeducation.org    Nursing Support:  When: Monday through Friday 7A-5PM except holidays  Where: Our direct line is [phone].    If you are having a true emergency please call 911.  In the event that the  line is busy or it is after hours please leave a voice message and we will return your call.  Please speak clearly, leaving your full name, birth date, best number to reach you and the reason for your call.   Medication refills: We will need the name of the medication, the dosage, the ordering provider, whether you get a 30 or 90 day refill, and the pharmacy name and address.  Medications will be ordered by the provider only.  Nurses cannot call in prescriptions.  Please allow 7 days for medication refills.  Physician requested updates: If your provider requested that you call with an update after starting medication, please be ready to provide us the medication and dosage, what time you take your medication, the time you attempt to fall asleep, time you fall asleep, when you wake up, and what time you get out of bed.  Sleep Study Results: We will contact you with sleep study results and/or next steps after the physician has reviewed your testing.

## 2023-12-20 ENCOUNTER — TELEPHONE (OUTPATIENT)
Dept: SLEEP CENTER | Facility: CLINIC | Age: 63
End: 2023-12-20

## 2023-12-20 NOTE — TELEPHONE ENCOUNTER
1/2/24 set up [facility]  5/1/24 compliance visit    Rx and clinicals for DME setup sent to [facility] via [employer] [place] [employer].

## 2023-12-22 LAB

## 2024-01-02 LAB

## 2024-05-01 ENCOUNTER — OFFICE VISIT (OUTPATIENT)
Dept: SLEEP CENTER | Facility: CLINIC | Age: 64
End: 2024-05-01
Payer: COMMERCIAL

## 2024-05-01 VITALS
WEIGHT: 261 LBS | SYSTOLIC BLOOD PRESSURE: 132 MMHG | BODY MASS INDEX: 44.56 KG/M2 | DIASTOLIC BLOOD PRESSURE: 70 MMHG | HEIGHT: 64 IN

## 2024-05-01 DIAGNOSIS — E66.01 CLASS 3 SEVERE OBESITY DUE TO EXCESS CALORIES WITHOUT SERIOUS COMORBIDITY WITH BODY MASS INDEX (BMI) OF 45.0 TO 49.9 IN ADULT (HCC): ICD-10-CM

## 2024-05-01 DIAGNOSIS — R40.0 DAYTIME SLEEPINESS: ICD-10-CM

## 2024-05-01 DIAGNOSIS — G47.33 OBSTRUCTIVE SLEEP APNEA: Primary | ICD-10-CM

## 2024-05-01 DIAGNOSIS — F51.12 INSUFFICIENT SLEEP SYNDROME: ICD-10-CM

## 2024-05-01 DIAGNOSIS — I10 ESSENTIAL HYPERTENSION: ICD-10-CM

## 2024-05-01 DIAGNOSIS — G25.81 RLS (RESTLESS LEGS SYNDROME): ICD-10-CM

## 2024-05-01 PROCEDURE — 99214 OFFICE O/P EST MOD 30 MIN: CPT | Performed by: INTERNAL MEDICINE

## 2024-05-01 NOTE — PATIENT INSTRUCTIONS

## 2024-05-01 NOTE — PROGRESS NOTES
Follow-Up Note - Sleep Center   Maria Del Carmen Nice  64 y.o. female  :1960  MRN:[id number]  DOS:2024    CC: I saw this patient for follow-up in clinic today for Sleep disordered breathing, Coexisting Sleep and Medical Problems.  She is using a ResMed machine.. Interval changes: None reported.    HST in 2023 demonstrated a respiratory event index (JONNY) of 8.1.  The lowest SpO2 recorded is 76% and 12.6 minutes during the study was spent with saturations below 90%.  The snore index was 35.9%.    PFSH, Problem List, Medications & Allergies were reviewed in EMR.   She  has a past medical history of COVID-19 virus infection (2021), GERD (gastroesophageal reflux disease), History of cervical dysplasia, Hypertension, Mitral regurgitation, Obesity, Postmenopausal bleeding, Rash, Stress incontinence, Thrombophlebitis (Early ), and Wears glasses.    She has a current medication list which includes the following prescription(s): vitamin d, ibuprofen, metoprolol succinate, triamterene-hydrochlorothiazide, benzonatate, multivitamin-minerals, and nystatin.    PHYSIOLOGICAL DATA REVIEW : Device has been used / days and average on days used is 6 hours and 2 minutes.  She did not use the device when on vacation because she got to carry all the components.   using PAP > 4 hours/night 63%. Estimated JONNY 0.8/hour with pressure of 10.3cm H2O@90th/95th percentile; patient has not been using non FDA approved devices to sanitize the machine.  INTERPRETATION: Compliance is Very good; Pressure setting is:optimal; ;   SUBJECTIVE: With respect to use of PAP, Maria Del Carmen  is experiencing minimal adverse effects:nasal congestion.She derives benefit.  Is satisfied with sleep and daytime function.   Sleep Routine: Maria Del Carmen reports getting 4-6 hrs sleep because she has to arise at 4 AM to get to work on time.; she has no difficulty initiating or maintaining sleep . She arises needing an alarm and feels more refreshed since  "on Rx.Maria Del Carmen denies daytime sleepiness, feels like napping & does when has the opportunity on workdays when she has to arises early.  She rated herself at Total score: 3 /24 on the [place] Sleepiness Scale.   Other issues: Recently she has not been experiencing restless leg symptoms..     Habits: Reports that she quit smoking about 16 years ago. Her smoking use included cigarettes. She started smoking about 36 years ago. She has a 15 pack-year smoking history. She has never used smokeless tobacco.,  Reports current alcohol use.,  Reports no history of drug use., Caffeine use:limited; Exercise routine: none.      ROS: Significant for few pounds weight gain.  She is reporting no respiratory or cardiac symptoms..    EXAM: /70   Ht 5' 4\" (1.626 m)   Wt 118 kg (261 lb)   BMI 44.80 kg/m²     Wt Readings from Last 3 Encounters:   05/01/24 118 kg (261 lb)   12/19/23 122 kg (269 lb)   07/28/23 119 kg (263 lb)      Patient is well groomed; well appearing.   CNS: Alert, orientated, speech clear & coherent  Psych: cooperative and in no distress. Mental state: Appears normal.  H&N: EOMI; NC/AT: No facial pressure marks, no rashes.    Skin/Extrem: col & hydration normal; no edema  Resp: Respiratory effort is normal  Physical findings otherwise essentially unchanged from previous.    IMPRESSION: Problem List Items & Comorbidities Addressed this Visit    1. Obstructive sleep apnea  PAP DME Resupply/Reorder    PAP DME Pressure Change      2. Daytime sleepiness        3. Insufficient sleep syndrome        4. RLS (restless legs syndrome)        5. Essential hypertension        6. Morbid obesity            PLAN:  I reviewed results of prior studies and physiologic data with the patient.   I discussed treatment options with risks and benefits.  Treatment with  PAP is medically necessary and Maria Del Carmen is agreable to continue use.   Care of equipment, methods to improve comfort using PAP and importance of compliance with therapy " "were discussed.  Pressure setting: change 7-12 cmH2O.    Rx provided to replace supplies and Care coordinated with DME provider.   Discussed strategies for weight reduction.  She declined referral to weight management program at this time.  Also advised allowing sufficient opportunity for sleep.  Follow-up is advised in 1 year or sooner if needed to monitor progress, compliance and to adjust therapy.     Thank you for allowing me to participate in the care of this patient.    Sincerely,     Authenticated electronically on 05/01/24   Board Certified Specialist     Portions of the record may have been created with voice recognition software. Occasional wrong word or \"sound a like\" substitutions may have occurred due to the inherent limitations of voice recognition software. There may also be notations and random deletions of words or characters from malfunctioning software. Read the chart carefully and recognize, using context, where substitutions/deletions have occurred.    "

## 2024-05-02 ENCOUNTER — TELEPHONE (OUTPATIENT)
Dept: SLEEP CENTER | Facility: CLINIC | Age: 64
End: 2024-05-02

## 2024-05-02 NOTE — TELEPHONE ENCOUNTER
RX for PAP pressure change and supply reorder sent to [facility] via [place].

## 2024-05-03 LAB

## 2024-05-16 DIAGNOSIS — I10 ESSENTIAL HYPERTENSION: ICD-10-CM

## 2024-05-16 RX ORDER — TRIAMTERENE AND HYDROCHLOROTHIAZIDE 37.5; 25 MG/1; MG/1
1 TABLET ORAL DAILY
Qty: 90 TABLET | Refills: 0 | Status: SHIPPED | OUTPATIENT
Start: 2024-05-16

## 2024-05-16 RX ORDER — METOPROLOL SUCCINATE 50 MG/1
50 TABLET, EXTENDED RELEASE ORAL EVERY MORNING
Qty: 90 TABLET | Refills: 0 | Status: SHIPPED | OUTPATIENT
Start: 2024-05-16

## 2024-05-16 NOTE — TELEPHONE ENCOUNTER
Called patient ,No answer,  patient needs to schedule appointment with office , last visit was with Dr Thompson. 2023. Left message for patient to call office & schedule appointment .

## 2024-08-06 ENCOUNTER — HOSPITAL ENCOUNTER (OUTPATIENT)
Dept: RADIOLOGY | Age: 64
Discharge: HOME | End: 2024-08-06
Payer: COMMERCIAL

## 2024-08-06 DIAGNOSIS — Z12.31 VISIT FOR SCREENING MAMMOGRAM: ICD-10-CM

## 2024-08-06 PROCEDURE — 77063 BREAST TOMOSYNTHESIS BI: CPT

## 2024-08-06 PROCEDURE — 77067 SCR MAMMO BI INCL CAD: CPT

## 2024-11-14 ENCOUNTER — APPOINTMENT (OUTPATIENT)
Dept: LAB | Facility: CLINIC | Age: 64
End: 2024-11-14
Payer: COMMERCIAL

## 2024-11-14 ENCOUNTER — OFFICE VISIT (OUTPATIENT)
Dept: FAMILY MEDICINE CLINIC | Facility: CLINIC | Age: 64
End: 2024-11-14
Payer: COMMERCIAL

## 2024-11-14 VITALS
RESPIRATION RATE: 18 BRPM | SYSTOLIC BLOOD PRESSURE: 132 MMHG | BODY MASS INDEX: 44.39 KG/M2 | HEART RATE: 78 BPM | HEIGHT: 64 IN | OXYGEN SATURATION: 98 % | TEMPERATURE: 97.8 F | DIASTOLIC BLOOD PRESSURE: 74 MMHG | WEIGHT: 260 LBS

## 2024-11-14 DIAGNOSIS — N39.3 STRESS INCONTINENCE: ICD-10-CM

## 2024-11-14 DIAGNOSIS — Z00.00 ANNUAL PHYSICAL EXAM: Primary | ICD-10-CM

## 2024-11-14 DIAGNOSIS — E66.01 CLASS 3 SEVERE OBESITY DUE TO EXCESS CALORIES WITHOUT SERIOUS COMORBIDITY WITH BODY MASS INDEX (BMI) OF 45.0 TO 49.9 IN ADULT (HCC): ICD-10-CM

## 2024-11-14 DIAGNOSIS — E66.813 CLASS 3 SEVERE OBESITY DUE TO EXCESS CALORIES WITHOUT SERIOUS COMORBIDITY WITH BODY MASS INDEX (BMI) OF 45.0 TO 49.9 IN ADULT (HCC): ICD-10-CM

## 2024-11-14 DIAGNOSIS — I10 ESSENTIAL HYPERTENSION: ICD-10-CM

## 2024-11-14 DIAGNOSIS — Z12.11 SCREENING FOR COLON CANCER: ICD-10-CM

## 2024-11-14 DIAGNOSIS — R73.03 PRE-DIABETES: ICD-10-CM

## 2024-11-14 DIAGNOSIS — G57.01 PIRIFORMIS SYNDROME, RIGHT: ICD-10-CM

## 2024-11-14 DIAGNOSIS — J30.2 SEASONAL ALLERGIES: ICD-10-CM

## 2024-11-14 DIAGNOSIS — G47.33 OBSTRUCTIVE SLEEP APNEA: ICD-10-CM

## 2024-11-14 LAB
ANION GAP SERPL CALCULATED.3IONS-SCNC: 10 MMOL/L (ref 4–13)
BUN SERPL-MCNC: 19 MG/DL (ref 5–25)
CALCIUM SERPL-MCNC: 9.5 MG/DL (ref 8.4–10.2)
CHLORIDE SERPL-SCNC: 104 MMOL/L (ref 96–108)
CHOLEST SERPL-MCNC: 166 MG/DL (ref ?–200)
CO2 SERPL-SCNC: 27 MMOL/L (ref 21–32)
CREAT SERPL-MCNC: 0.87 MG/DL (ref 0.6–1.3)
EST. AVERAGE GLUCOSE BLD GHB EST-MCNC: 123 MG/DL
GFR SERPL CREATININE-BSD FRML MDRD: 70 ML/MIN/1.73SQ M
GLUCOSE P FAST SERPL-MCNC: 101 MG/DL (ref 65–99)
HBA1C MFR BLD: 5.9 %
HDLC SERPL-MCNC: 48 MG/DL
LDLC SERPL CALC-MCNC: 97 MG/DL (ref 0–100)
NONHDLC SERPL-MCNC: 118 MG/DL
POTASSIUM SERPL-SCNC: 4.3 MMOL/L (ref 3.5–5.3)
SODIUM SERPL-SCNC: 141 MMOL/L (ref 135–147)
TRIGL SERPL-MCNC: 103 MG/DL (ref ?–150)

## 2024-11-14 PROCEDURE — 99396 PREV VISIT EST AGE 40-64: CPT

## 2024-11-14 PROCEDURE — 80061 LIPID PANEL: CPT

## 2024-11-14 PROCEDURE — 99214 OFFICE O/P EST MOD 30 MIN: CPT

## 2024-11-14 PROCEDURE — 36415 COLL VENOUS BLD VENIPUNCTURE: CPT

## 2024-11-14 PROCEDURE — 80048 BASIC METABOLIC PNL TOTAL CA: CPT

## 2024-11-14 RX ORDER — FLUTICASONE PROPIONATE 50 MCG
1 SPRAY, SUSPENSION (ML) NASAL DAILY
Qty: 16 G | Refills: 1 | Status: SHIPPED | OUTPATIENT
Start: 2024-11-14

## 2024-11-14 NOTE — ASSESSMENT & PLAN NOTE
Patient has lost 10 pounds with diet changes in the last 2 weeks  Encourage lifestyle modification with diet and exercise, recommend 150 minutes of moderate intensity exercise weekly  Orders:    Lipid panel; Future

## 2024-11-14 NOTE — ASSESSMENT & PLAN NOTE
Last labs, 4/2021, A1c 5.9  Currently not on any medication, recheck labs as below  Continue lifestyle modifications  Orders:    Hemoglobin A1C; Future

## 2024-11-14 NOTE — PROGRESS NOTES
Adult Annual Physical  Name: Maria Del Carmen Nice      : 1960      MRN: [id number]  Encounter Provider: Jessie Chauhan DO  Encounter Date: 2024   Encounter department: [facility]    Assessment & Plan  Annual physical exam  - screening for cardiovascular disease: Due for lab work, basic labs ordered  - screening for breast cancer: mammogram 2024, repeat in 1 year  - screening for cervical cancer: last pap back in 2018, follows GYN.  Overdue for Pap with HPV cotesting.  As patient reports that she has not had any abnormal Paps in the past, this will likely be her last Pap smear  - screening for colon cancer: Cologuard ordered today  - immunizations: Would recommend annual flu  - dexa: One-time DEXA screening due at age 65  - counseled pt on lifestyle modifications such as diet changes and 150 mins/weekly of moderate intensity exercise        Screening for colon cancer    Orders:    Cologuard    Seasonal allergies  Reports worsening of seasonal allergies with sneezing, congestion  Takes Claritin intermittently with relief once in a while.  Recommend either taking Claritin daily or switching to another allergy medication like Allegra for a brief period of time  Can trial Flonase as below  Orders:    fluticasone (FLONASE) 50 mcg/act nasal spray; 1 spray into each nostril daily    Pre-diabetes  Last labs, 2021, A1c 5.9  Currently not on any medication, recheck labs as below  Continue lifestyle modifications  Orders:    Hemoglobin A1C; Future    Essential hypertension  Current regimen: Maxzide 37.5-25 mg daily, Toprol 50 mg daily  Tolerating all medications well  Home SBPs 120s-130s  Basic labs as below, continue current regimen    Orders:    Basic metabolic panel; Future    Lipid panel; Future    Obstructive sleep apnea  Uses BiPAP at home.  Continues to have only just okay sleep  Sleep hygiene reviewed       Morbid obesity  Patient has lost 10 pounds with diet changes in the last 2  weeks  Encourage lifestyle modification with diet and exercise, recommend 150 minutes of moderate intensity exercise weekly  Orders:    Lipid panel; Future    Stress incontinence  Patient reports episodes of urinary leakage with sneezing and coughing  Discussed pelvic floor physical therapy versus medication options, declined at this time  Pelvic floor exercises provided and handout         Piriformis syndrome, right  Reports intermittent episodes of shooting pain sensation coming from her right buttock down to her knee and sometimes down to her ankle  Denies any back pain, suspect piriformis syndrome.  Interested in physical therapy, referral provided today  Orders:    Ambulatory Referral to Physical Therapy; Future    Immunizations and preventive care screenings were discussed with patient today. Appropriate education was printed on patient's after visit summary.    Counseling:  Alcohol/drug use: discussed moderation in alcohol intake, the recommendations for healthy alcohol use, and avoidance of illicit drug use.  Dental Health: discussed importance of regular tooth brushing, flossing, and dental visits.  Injury prevention: discussed safety/seat belts, safety helmets, smoke detectors, carbon monoxide detectors, and smoking near bedding or upholstery.  Sexual health: discussed sexually transmitted diseases, partner selection, use of condoms, avoidance of unintended pregnancy, and contraceptive alternatives.  Exercise: the importance of regular exercise/physical activity was discussed. Recommend exercise 3-5 times per week for at least 30 minutes.          History of Present Illness     Adult Annual Physical:  Patient presents for annual physical. She has been working on diet modifications and has been walking, has lost 10 pounds in the last 2 weeks and is motivated to lose more.  She does work at times at [employer] so she is moving around at work as well.  Has noticed a flare in her allergies, takes Claritin 10 mg  at times which helps at times but sometimes it does not.  Also complains of a shooting pain sensation coming from her right buttock down to her right knee and occasionally down to her ankle.  This occurs intermittently, no specific trigger.  Denies any back pain.  Patient inquires about physical therapy for sciatica.     Diet and Physical Activity:  - Diet/Nutrition: well balanced diet, consuming 3-5 servings of fruits/vegetables daily, adequate whole grain intake and adequate fiber intake.  - Exercise: walking and 30-60 minutes on average.    Depression Screening:  - PHQ-2 Score: 0    General Health:  - Sleep: unrefreshing sleep. has DOTTIE with BiPAP which she uses occasionally and helpful  - Hearing: normal hearing bilateral ears.  - Vision: wears glasses and goes for regular eye exams.  - Dental: regular dental visits and brushes teeth twice daily.    /GYN Health:  - Follows with GYN: yes.   - Menopause: postmenopausal.   - Contraception: menopause. last pap 2018      Review of Systems   Constitutional:  Negative for chills and fever.   HENT:  Positive for congestion and rhinorrhea.    Eyes:  Negative for visual disturbance.   Respiratory:  Negative for cough and shortness of breath.    Cardiovascular:  Negative for chest pain and palpitations.   Gastrointestinal:  Negative for abdominal pain, constipation, diarrhea, nausea and vomiting.   Genitourinary:  Negative for dysuria.   Musculoskeletal:  Negative for arthralgias.        Sciatica R leg   Allergic/Immunologic: Positive for environmental allergies.   Neurological:  Negative for dizziness, light-headedness and headaches.     Current Outpatient Medications on File Prior to Visit   Medication Sig Dispense Refill    Cholecalciferol (VITAMIN D) 2000 units CAPS Take 1 tablet by mouth daily      ibuprofen (MOTRIN) 200 mg tablet Take 400 mg by mouth every 6 (six) hours as needed for mild pain      metoprolol succinate (TOPROL-XL) 50 mg 24 hr tablet TAKE 1 TABLET BY  "MOUTH EVERY DAY IN THE MORNING 30 tablet 0    triamterene-hydrochlorothiazide (MAXZIDE-25) 37.5-25 mg per tablet TAKE 1 TABLET BY MOUTH EVERY DAY 90 tablet 0    [DISCONTINUED] benzonatate (TESSALON PERLES) 100 mg capsule Take 1 capsule (100 mg total) by mouth 3 (three) times a day as needed for cough 20 capsule 0    [DISCONTINUED] multivitamin-minerals (CENTRUM ADULTS) tablet Take 1 tablet by mouth daily 30 tablet 0    [DISCONTINUED] nystatin (MYCOSTATIN) powder Apply topically 3 (three) times a day 15 g 0     No current facility-administered medications on file prior to visit.        Objective   /74 (BP Location: Left arm, Patient Position: Sitting, Cuff Size: Large)   Pulse 78   Temp 97.8 °F (36.6 °C)   Resp 18   Ht 5' 4\" (1.626 m)   Wt 118 kg (260 lb)   SpO2 98%   BMI 44.63 kg/m²     Physical Exam  Vitals reviewed.   Constitutional:       Appearance: She is obese.   HENT:      Head: Normocephalic and atraumatic.      Right Ear: External ear normal.      Left Ear: External ear normal.      Nose: Nose normal.      Mouth/Throat:      Pharynx: Oropharynx is clear.   Eyes:      Extraocular Movements: Extraocular movements intact.      Conjunctiva/sclera: Conjunctivae normal.   Cardiovascular:      Rate and Rhythm: Normal rate and regular rhythm.      Pulses: Normal pulses.      Heart sounds: Normal heart sounds.   Pulmonary:      Effort: Pulmonary effort is normal.      Breath sounds: Normal breath sounds.   Abdominal:      Palpations: Abdomen is soft.   Musculoskeletal:      Cervical back: Neck supple.      Right lower leg: No edema.      Left lower leg: No edema.   Skin:     General: Skin is warm.      Comments: Varicose veins bilaterally   Neurological:      Mental Status: She is alert and oriented to person, place, and time.   Psychiatric:         Mood and Affect: Mood normal.         Behavior: Behavior normal.         Thought Content: Thought content normal.         Judgment: Judgment normal. "       Administrative Statements   I have spent a total time of 40 minutes in caring for this patient on the day of the visit/encounter including Risks and benefits of tx options, Instructions for management, Patient and family education, Importance of tx compliance, Risk factor reductions, Counseling / Coordination of care, Documenting in the medical record, Reviewing / ordering tests, medicine, procedures  , and Obtaining or reviewing history  .

## 2024-11-14 NOTE — ASSESSMENT & PLAN NOTE
Current regimen: Maxzide 37.5-25 mg daily, Toprol 50 mg daily  Tolerating all medications well  Home SBPs 120s-130s  Basic labs as below, continue current regimen    Orders:    Basic metabolic panel; Future    Lipid panel; Future

## 2024-11-17 DIAGNOSIS — I10 ESSENTIAL HYPERTENSION: ICD-10-CM

## 2024-11-17 RX ORDER — METOPROLOL SUCCINATE 50 MG/1
50 TABLET, EXTENDED RELEASE ORAL EVERY MORNING
Qty: 30 TABLET | Refills: 0 | Status: CANCELLED | OUTPATIENT
Start: 2024-11-17

## 2024-11-18 ENCOUNTER — RESULTS FOLLOW-UP (OUTPATIENT)
Dept: FAMILY MEDICINE CLINIC | Facility: CLINIC | Age: 64
End: 2024-11-18

## 2024-11-18 RX ORDER — TRIAMTERENE AND HYDROCHLOROTHIAZIDE 37.5; 25 MG/1; MG/1
1 TABLET ORAL DAILY
Qty: 90 TABLET | Refills: 0 | Status: SHIPPED | OUTPATIENT
Start: 2024-11-18

## 2024-11-18 RX ORDER — METOPROLOL SUCCINATE 50 MG/1
50 TABLET, EXTENDED RELEASE ORAL EVERY MORNING
Qty: 90 TABLET | Refills: 1 | Status: SHIPPED | OUTPATIENT
Start: 2024-11-18

## 2024-11-18 NOTE — TELEPHONE ENCOUNTER
Patient called in to check the status of refill. Patient made aware that we are waiting for approval.   Patient is out of the medication.

## 2024-12-05 ENCOUNTER — EVALUATION (OUTPATIENT)
Dept: PHYSICAL THERAPY | Facility: CLINIC | Age: 64
End: 2024-12-05
Payer: COMMERCIAL

## 2024-12-05 DIAGNOSIS — M54.16 LUMBAR RADICULOPATHY: Primary | ICD-10-CM

## 2024-12-05 DIAGNOSIS — M25.551 RIGHT HIP PAIN: ICD-10-CM

## 2024-12-05 PROCEDURE — 97112 NEUROMUSCULAR REEDUCATION: CPT

## 2024-12-05 PROCEDURE — 97161 PT EVAL LOW COMPLEX 20 MIN: CPT

## 2024-12-05 NOTE — PROGRESS NOTES
PT EVALUATION    Today's date: 24  Patient name: Maria Del Carmen Nice  : 1960  MRN: [id number]  Referring provider: Jessie Chauhan*  Dx:   1. Lumbar radiculopathy    2. Right hip pain        ASSESSMENT:  Maria Del Carmen Nice is a 64 y.o. female who presents with signs and symptoms consistent with lumbar radiculopathy. Patient presents with pain, decreased strength, and decreased ROM. Of note, patient was TTP at the lateral hip, particularly at glute med and greater troch. She also had some pain with CPA and R UPA of L5-S1. She had negative passive SLR and slump test. Due to these impairments, patient has difficulty performing a/iadls and work-related activities. Therefore, patient would benefit from skilled physical therapy to address the impairments, improve their level of function, and to improve their overall quality of life. Patient was provided education regarding diagnosis, POC, and was provided an HEP at the end of session. No further referral appears necessary at this time based upon examination findings.      Impairments:    restricted ROM    decreased strength   pain with function   activity intolerance     Prognosis:  Good  Positive and negative prognostic indicator(s):  pain >3 months    Goals:    STG (to be met in 4 weeks)  Patient will be independent with basic HEP for self-management.  Patient will improve lumbar ROM by 5-10 degrees in all deficient planes.  Patient will improve LE strength by 1/2 MMT grade in all deficient planes.   Patient will report decreased pain by 25% at its worst.    LTG (to be met by discharge):  Patient will be independent with comprehensive HEP for maintenance after discharge.   Patient will improve FOTO score to equal to or above predicted value.   Patient will improve LE strength to 5/5 MMT grade to assist with adl/iadls.  Patient will be able to perform work duties without pain/restriction.      Planned interventions:  home exercise program, patient  education, manual therapy, massage, graded activity, flexibility, functional range of motion exercises, strengthening, abdominal trunk stabilization, balance and weight bearing training, gait training, McConnel taping, low level laser with IASTM, and modalities prn    Duration in visits:  12  Frequency: 2 visits per week  Duration in weeks:  6    History of Current Injury: Patient has been dealing with low back/posterior hip pain for about 6 months, potentially longer. Patient states since the onset, it has been up and down. Patient does not see a pattern with pain. She does notice sometimes it's worse when the weather is bad. States it starts right at the center of the R buttock. Other times it's the side of the hip and radiates down. Denies numbness and tingling. Patient denies interference with sleep. When pain comes on, it takes about an 1/2 hour to settle down. States sitting for long periods does not bother her. It's moreso prolonged standing.    Pain location: center of R buttock, lateral hip  Pain descriptors: Dull Ache  Pain at Currently:  2/10  Pain at Best:  0/10  Pain at Worst:  8-9/10      Aggravating factors: prolonged standing  Easing factors: ibuprofen, rest    Imaging: N/A        Hobbies/Interests: n/a  Occupation: part-time where she does a lot of standing  Patient goals: Patient reports goals for physical therapy would be decreased pain        Objective     Palpation     Additional Palpation Details  Patient with some tenderness at the posterior hip, particularly around glute med    Tenderness     Right Hip   Tenderness in the greater trochanter.     Active Range of Motion     Lumbar   Flexion: 85 degrees  Restriction level: minimal  Extension: 15 degrees  Restriction level: moderate  Left lateral flexion:  WFL  Right lateral flexion:  WFL and with pain  Left rotation:  WFL  Right rotation:  WFL    Joint Play   Joints within functional limits: L1, L2, L3, L4, L5 and S1     Pain: L5 and S1      Strength/Myotome Testing     Left Hip   Planes of Motion   Flexion: 4+  Extension: 4  Abduction: 4  External rotation: 4+    Isolated Muscles   Gluteus medius: 4    Right Hip   Planes of Motion   Flexion: 4+  Extension: 4  Abduction: 4  External rotation: 5    Isolated Muscles   Gluteus medius: 4    Left Knee   Flexion: 5  Prone flexion: 5    Right Knee   Flexion: 5  Prone flexion: 5    Left Ankle/Foot   Dorsiflexion: 5  Plantar flexion: 5    Right Ankle/Foot   Dorsiflexion: 5  Plantar flexion: 5    Additional Strength Details  Patient with some pain with sidelying resisted ER (clamshells).    Tests     Lumbar     Right   Negative passive SLR and slump test.     Right Hip   Positive ANA.             Precautions: hypertension      Diagnosis:    Precautions:    Primary Goals:    [employer] Access Code:   *asterisks by exercise = given for HEP   Manuals 12/5       Hip STM prn        Hip PROM                                There Ex        Active warmup        Hip add        Hip abd                                                        Neuro Re-Ed        Nerve glides HEP       Hamstring stretch HEP       Piriformis stretch        Glute bridges        Clamshells        Belly press        Stir the pot        Paloff press        Supine march w/TVA        Re-evaluation              Ther Act             Step ups                                           Modalities

## 2024-12-11 ENCOUNTER — OFFICE VISIT (OUTPATIENT)
Dept: PHYSICAL THERAPY | Facility: CLINIC | Age: 64
End: 2024-12-11
Payer: COMMERCIAL

## 2024-12-11 DIAGNOSIS — M54.16 LUMBAR RADICULOPATHY: Primary | ICD-10-CM

## 2024-12-11 DIAGNOSIS — M25.551 RIGHT HIP PAIN: ICD-10-CM

## 2024-12-11 PROCEDURE — 97140 MANUAL THERAPY 1/> REGIONS: CPT

## 2024-12-11 PROCEDURE — 97112 NEUROMUSCULAR REEDUCATION: CPT

## 2024-12-11 PROCEDURE — 97110 THERAPEUTIC EXERCISES: CPT

## 2024-12-11 NOTE — PROGRESS NOTES
"Daily Note     Today's date: 2024  Patient name: Maria Del Carmen Nice  : 1960  MRN: [id number]  Referring provider: Jessie Chauhan*  Dx:   Encounter Diagnosis     ICD-10-CM    1. Lumbar radiculopathy  M54.16       2. Right hip pain  M25.551           Start Time: 1445  Stop Time: 1532  Total time in clinic (min): 47 minutes    Subjective: Patient states her back has been bothering her a lot for the past couple of days. States yesterday it was really bad - she couldn't find a comfortable position as both sitting down and standing bothered her.       Objective: See treatment diary below    Directional preference:  Standing flexion: no change in symptoms at low back and glutes (4/10 t/o)  Standing extensions: symptoms in glute the same however symptoms in low improved    Assessment: Tolerated treatment well. Patient was TTP at the R low back and R glute. Initiated POC with a focus on lumbar mobility and hip strengthening. Patient seemed to have more relief with extension based exercises although flexion base did not increase in symptoms. Patient needed cueing t/o session for accurate performance. Patient would benefit from continued PT      Plan: Continue per plan of care.      Precautions: hypertension      Diagnosis:    Precautions:    Primary Goals:    [employer] Access Code:   *asterisks by exercise = given for HEP   Manuals       Hip STM prn  Glute AA      Hip PROM        Lumbar  R side AA                      There Ex        Active warmup  Nustep 5' L1      Hip add  20x3\"      Hip abd        DKTC  2' x 3 sec hold      LTR  2'                                       Neuro Re-Ed        Nerve glides HEP 2x10 supine      Hamstring stretch HEP 10x10\"      Piriformis stretch  3x20\"      Glute bridges  2x10      Clamshells        Belly press        Stir the pot        Paloff press        PPT        Supine march w/TVA        Re-evaluation              Ther Act             Step ups                  "                          Modalities             Heat  8'

## 2024-12-12 ENCOUNTER — OFFICE VISIT (OUTPATIENT)
Dept: PHYSICAL THERAPY | Facility: CLINIC | Age: 64
End: 2024-12-12
Payer: COMMERCIAL

## 2024-12-12 DIAGNOSIS — M25.551 RIGHT HIP PAIN: ICD-10-CM

## 2024-12-12 DIAGNOSIS — M54.16 LUMBAR RADICULOPATHY: Primary | ICD-10-CM

## 2024-12-12 PROCEDURE — 97112 NEUROMUSCULAR REEDUCATION: CPT

## 2024-12-12 PROCEDURE — 97110 THERAPEUTIC EXERCISES: CPT

## 2024-12-12 PROCEDURE — 97140 MANUAL THERAPY 1/> REGIONS: CPT

## 2024-12-12 NOTE — PROGRESS NOTES
"Daily Note     Today's date: 2024  Patient name: Maria Del Carmen Nice  : 1960  MRN: [id number]  Referring provider: Jessie Chauhan*  Dx:   Encounter Diagnosis     ICD-10-CM    1. Lumbar radiculopathy  M54.16       2. Right hip pain  M25.551           Start Time: 1445  Stop Time: 1530  Total time in clinic (min): 45 minutes    Subjective: Patient states her pain comes on randomly. She was fine at work and then did a lot of driving. The driving itself didn't cause a lot of pain but afterwards she was doing some standing in her kitchen when she got pain the low back/glute.       Objective: See treatment diary below      Assessment: Tolerated treatment well. Patient required max verbal and tactile cueing to perform PPT correctly. Additional cueing of BP cuff for biofeedback further helped with the correct technique. Good tolerance to the additional exercises added today. Patient would benefit from continued PT      Plan: Continue per plan of care.      Precautions: hypertension    [facility]  Diagnosis:    Precautions:    Primary Goals:    [employer] Access Code:   *asterisks by exercise = given for HEP   Manuals      Hip STM prn  Glute AA R Glute (foam roll) -AA     Hip PROM        Lumbar  R side AA                      There Ex        Active warmup  Nustep 5' L1 NV     Hip add  20x3\" Seated 20x3\"     Hip abd   Iso 20x3\"     DKTC  2' x 3 sec hold 2' x 3 sec hold     LTR  2'  20xea     Lumbar extension   Standing x15                             Neuro Re-Ed        Nerve glides HEP 2x10 supine 10x5 ankle pumps     Hamstring stretch HEP 10x10\" 5x20\" supine     Piriformis stretch  3x20\" 5x15\"     Glute bridges  2x10 2x10     Clamshells        Belly press   20x3\" w/PB     Stir the pot        Paloff press        PPT   15x3\"     Supine march w/TVA        Re-evaluation              Ther Act             Step ups                                           Modalities             Heat  " 8'

## 2024-12-16 ENCOUNTER — OFFICE VISIT (OUTPATIENT)
Dept: PHYSICAL THERAPY | Facility: CLINIC | Age: 64
End: 2024-12-16
Payer: COMMERCIAL

## 2024-12-16 DIAGNOSIS — M25.551 RIGHT HIP PAIN: ICD-10-CM

## 2024-12-16 DIAGNOSIS — M54.16 LUMBAR RADICULOPATHY: Primary | ICD-10-CM

## 2024-12-16 PROCEDURE — 97112 NEUROMUSCULAR REEDUCATION: CPT

## 2024-12-16 PROCEDURE — 97110 THERAPEUTIC EXERCISES: CPT

## 2024-12-16 NOTE — PROGRESS NOTES
"Daily Note     Today's date: 2024  Patient name: Maria Del Carmen Nice  : 1960  MRN: [id number]  Referring provider: Jessie Chauhan*  Dx:   Encounter Diagnosis     ICD-10-CM    1. Lumbar radiculopathy  M54.16       2. Right hip pain  M25.551           Start Time: 1530  Stop Time: 1613  Total time in clinic (min): 43 minutes    Subjective: Patient states she didn't do much today so she is feeling good.       Objective: See treatment diary below      Assessment: Tolerated treatment well. Patient required intermittent cueing t/o session for accurate performance. Patient reported some burning of the glute after doing lumbar standing extensions. Symptoms improved after seated lumbar flexion. Recommended patient try to those at home, particularly when she has a bout of pain. Patient would benefit from continued PT      Plan: Continue per plan of care.      Precautions: hypertension      Diagnosis:    Precautions:    Primary Goals:    [employer] Access Code:   *asterisks by exercise = given for HEP   Manuals     Hip STM prn  Glute AA R Glute (foam roll) -AA     Hip PROM        Lumbar  R side AA                      There Ex        Active warmup  Nustep 5' L1 NV Nustep 5' L1    Hip add  20x3\" Seated 20x3\" 1.5' x 3 sec hold    Hip abd   Iso 20x3\" 1.5' x 3 sec hold    DKTC  2' x 3 sec hold 2' x 3 sec hold 1.5'    LTR  2'  20xea 20xea    Lumbar extension   Standing x15 2x10 (hold for future)                            Neuro Re-Ed        Nerve glides HEP 2x10 supine 10x5 ankle pumps 20x5 ankle pumps    Hamstring stretch HEP 10x10\" 5x20\" supine 5x20\" supine    Piriformis stretch  3x20\" 5x15\"     Glute bridges  2x10 2x10 2x10    Clamshells        Belly press   20x3\" w/PB 20x3\" w/PB    Stir the pot        Paloff press        PPT   15x3\" 20x3\" w/ biofeedback    Seated lumbar flexion    2x10    Supine march w/TVA        Re-evaluation              Ther Act             Step ups                   "                         Modalities             Heat  8'

## 2024-12-19 ENCOUNTER — APPOINTMENT (OUTPATIENT)
Dept: PHYSICAL THERAPY | Facility: CLINIC | Age: 64
End: 2024-12-19
Payer: COMMERCIAL

## 2024-12-23 ENCOUNTER — OFFICE VISIT (OUTPATIENT)
Dept: PHYSICAL THERAPY | Facility: CLINIC | Age: 64
End: 2024-12-23
Payer: COMMERCIAL

## 2024-12-23 DIAGNOSIS — M54.16 LUMBAR RADICULOPATHY: Primary | ICD-10-CM

## 2024-12-23 DIAGNOSIS — M25.551 RIGHT HIP PAIN: ICD-10-CM

## 2024-12-23 PROCEDURE — 97140 MANUAL THERAPY 1/> REGIONS: CPT

## 2024-12-23 PROCEDURE — 97530 THERAPEUTIC ACTIVITIES: CPT

## 2024-12-23 PROCEDURE — 97110 THERAPEUTIC EXERCISES: CPT

## 2024-12-23 PROCEDURE — 97112 NEUROMUSCULAR REEDUCATION: CPT

## 2024-12-23 NOTE — PROGRESS NOTES
"Daily Note     Today's date: 2024  Patient name: Maria Del Carmen Nice  : 1960  MRN: [id number]  Referring provider: Jessie Chauhan*  Dx:   Encounter Diagnosis     ICD-10-CM    1. Lumbar radiculopathy  M54.16       2. Right hip pain  M25.551           Start Time: 1445  Stop Time: 1530  Total time in clinic (min): 45 minutes    Subjective: Patient states she's been having a lot of pain/aches in the R hip and R glute. States she usually works or about 5 hours where she is doing a lot of standing and walking so afterwards she feels it a lot.       Objective: See treatment diary below      Assessment: Tolerated treatment well. Added in S/L clamshells with patient reporting pain  in the R glute with increasing reps. Patient had good tolerance to theragun massage to the glutes. No increase in pain at the end of session compared to the beginning of session. Explained to the patient that what she is experiencing is nerve related and encouraged her to continue doing the stretches and nerve glides. Patient would benefit from continued PT      Plan: Continue per plan of care.      Precautions: hypertension    Patient 1:1 from 2:45-3:22  Diagnosis:    Precautions:    Primary Goals:    [employer] Access Code:   *asterisks by exercise = given for HEP   Manuals    Hip STM prn  Glute AA R Glute (foam roll) -AA  R glute in S/L tharagun - AA   Hip PROM        Lumbar  R side AA                      There Ex        Active warmup  Nustep 5' L1 NV Nustep 5' L1 NV   Hip add  20x3\" Seated 20x3\" 1.5' x 3 sec hold 1.5' x 3 sec hold   Hip abd   Iso 20x3\" 1.5' x 3 sec hold 1.5 x 3 sec hold   DKTC  2' x 3 sec hold 2' x 3 sec hold 1.5' 1.5'   LTR  2'  20xea 20xea    Lumbar extension   Standing x15 2x10 (hold for future) Standing 2x10                           Neuro Re-Ed        Nerve glides HEP 2x10 supine 10x5 ankle pumps 20x5 ankle pumps 1.5' w/ankle pumps   Hamstring stretch HEP 10x10\" 5x20\" supine " "5x20\" supine 4x20\"   Piriformis stretch  3x20\" 5x15\"  4x20\"   Glute bridges  2x10 2x10 2x10    Clamshells     S/L RTB   Belly press   20x3\" w/PB 20x3\" w/PB    Stir the pot        Paloff press        PPT   15x3\" 20x3\" w/ biofeedback    Seated lumbar flexion    2x10    Supine march w/TVA        Re-evaluation              Ther Act             Step ups          6\" 2x10                                 Modalities             Heat  8'                                                 "

## 2024-12-30 ENCOUNTER — EVALUATION (OUTPATIENT)
Dept: PHYSICAL THERAPY | Facility: CLINIC | Age: 64
End: 2024-12-30
Payer: COMMERCIAL

## 2024-12-30 DIAGNOSIS — R42 DIZZINESS: Primary | ICD-10-CM

## 2024-12-30 PROCEDURE — 97162 PT EVAL MOD COMPLEX 30 MIN: CPT | Performed by: PHYSICAL THERAPIST

## 2024-12-30 PROCEDURE — 97112 NEUROMUSCULAR REEDUCATION: CPT | Performed by: PHYSICAL THERAPIST

## 2024-12-30 NOTE — PROGRESS NOTES
PT EVALUATION    Today's date: 24  Patient name: Maria Del Carmen Nice  : 1960  MRN: [id number]  Referring provider: Jessie Chauhan*  Dx:   1. Dizziness        ASSESSMENT:  Maria Del Carmen Nice is a 64 y.o. female who presents with signs and symptoms consistent of acute on chronic vertigo. Clinical examination demonstrated intact cranial nerves, negative neurological testing, and asymptomatic VBI testing. Haleigh Hallpike was performed and demonstrated positive R canalithiasis BPPV with up-beating torsional nystagmus. CRT was performed 3x, however continues to have symptoms and nystagmus on last re-test.  Discussed and educated patient on normal residual symptoms and expectations for the next 12-24 hours. Due to these impairments, Patient has difficulty performing a/iadls, recreational activities, and work-related activities. Patient would benefit from skilled physical therapy to address the impairments, improve their level of function, and to improve their overall quality of life.      Impairments:    imbalance   Positional dizziness  Dizziness with head turns     Prognosis:  Good  Positive and negative prognostic indicator(s):  multiple comorbidities    Goals:    Short Term Goals: to be achieved by 4 weeks  1) Patient to be independent with basic HEP.  2) Decrease dizziness to 1/10 at its worst.    Long Term Goals: to be achieved by discharge  1) FOTO equal to or greater than target score indicating improvements with overall function.  2) Patient will demonstrate normalized BPPV testing in order to return to normal daily activities.   3) Patient will have little to no dizziness with positional changes and fast head movements in order to participate in daily activities.      Planned interventions:  home exercise program, patient education, balance and weight bearing training, canalith repositioning techniques, and vestibulo-ocular motor training    Duration in visits:  4  Frequency: 1 visits per  week  Duration in weeks:  4    History of Current Injury: Patient notes that she ha been having positional dizziness for over 20 years. Patient notes that she got some ear drip testing for it before years ago that told her something was wrong but that was it. Patient notes that the episodes last seconds to a few minutes. Patient notes it happens the most getting in/out of bed, looking up, bending over, rolling over in bed, and when she is at the dentist or . Patient notes that it feels like it is more of a swaying rather than a spinning dizziness. Patient denies head or neck trauma.     Pain location: PT for low back pain     Imaging:   Special Questions:   Dizziness: Yes   Dysphagia: No  Dysarthria: No  Diploplia: No  Drop Attacks: No  Numbness: No  Nausea: No  Nystagmus: Yes         Hobbies/Interests: n/a  Occupation: part time at [email or web address]   Patient goals: Patient reports goals for physical therapy would be to get rid of the dizziness.       Objective     Concurrent Complaints  Negative for headaches, nausea/motion sickness, tinnitus, visual change, hearing loss, memory loss, aural fullness, poor concentration and peripheral neuropathy  Neuro Exam:     Dizziness  Positive for rocking or swaying.   Negative for disequilibrium, vertigo, oscillopsia, motion sickness, light-headedness, diplopia and floating or swimming.     Exacerbating factors  Positive for bending over, rolling in bed, looking up, turning head and supine to/from sitting.   Negative for walking, optokinetic movement and walking in busy environment.     Symptoms   Duration: 60+ seconds  Frequency: 7x a week  Intensity at best: 0/10  Intensity at worst: 0/10  Average intensity: 5/10    Headaches   Patient reports headaches: No.     Cervical exam   Ligament Laxity Testing   Alar ligament: WNL  Sharp Caty: hypermobile and WNL  Modified VBI   Left: asymptomatic  Right: asymptomatic  Seated posture: forward head posture and internally rotated  shoulders    Oculomotor exam   Oculomotor ROM: WNL  Resting nystagmus: not present   Gaze holding nystagmus: not present left  and not present right  Smooth pursuits: within normal limits and mild dizziness  Vertical saccades: normal and mild dizziness  Horizontal saccades: normal  Convergence: normal and normal convergence  Cover test: abnormal    Positional testing   [place]-Hallpike   Right posterior canal: symptomatic, torsional and upbeating  Positional testing comment: L Haleigh Hallpike and Roll test not tested secondary to initial testing on R. Will address at a later session if needed.          Precautions: GERD, lumbar radiculopathy currently receiving PT      Manuals                                                                 Neuro Re-Ed             CRT                                                                                            Ther Ex                                                                                                                     Ther Activity                                       Gait Training                                       Modalities

## 2025-01-02 ENCOUNTER — OFFICE VISIT (OUTPATIENT)
Dept: PHYSICAL THERAPY | Facility: CLINIC | Age: 65
End: 2025-01-02
Payer: COMMERCIAL

## 2025-01-02 DIAGNOSIS — M54.16 LUMBAR RADICULOPATHY: Primary | ICD-10-CM

## 2025-01-02 DIAGNOSIS — M25.551 RIGHT HIP PAIN: ICD-10-CM

## 2025-01-02 PROCEDURE — 97110 THERAPEUTIC EXERCISES: CPT

## 2025-01-02 PROCEDURE — 97112 NEUROMUSCULAR REEDUCATION: CPT

## 2025-01-02 PROCEDURE — 97140 MANUAL THERAPY 1/> REGIONS: CPT

## 2025-01-02 NOTE — PROGRESS NOTES
"Daily Note     Today's date: 2025  Patient name: Maria Del Carmen Nice  : 1960  MRN: [id number]  Referring provider: Jessie Chauhan*  Dx:   Encounter Diagnosis     ICD-10-CM    1. Lumbar radiculopathy  M54.16       2. Right hip pain  M25.551                      Subjective: Patient states she has to leave 15 minutes early. States her vertigo is much better - she still has a little bit of dizziness but not as bad.       Objective: See treatment diary below      Assessment: Tolerated treatment well. Patient felt improvement in glute symptoms after completing exercises. However, pallof press with lateral walks did aggravate symptoms at the end of session. Encouraged patient that if symptoms persist to do repeated standing or seated lumbar flexion as well as the piriformis stretch. Patient would benefit from continued PT      Plan: Continue per plan of care.      Precautions: GERD, lumbar radiculopathy currently receiving PT      Diagnosis:    Precautions:    Primary Goals:    [employer] Access Code:   *asterisks by exercise = given for HEP   Manuals    Hip STM prn Glute w/threagun - AA Glute AA R Glute (foam roll) -AA  R glute in S/L tharagun - AA   Hip PROM        Lumbar  R side AA                      There Ex        Active warmup Nustep 5'; p! Nustep 5' L1 NV Nustep 5' L1 NV   Hip add 20x3\" 20x3\" Seated 20x3\" 1.5' x 3 sec hold 1.5' x 3 sec hold   Hip abd   Iso 20x3\" 1.5' x 3 sec hold 1.5 x 3 sec hold   DKTC  2' x 3 sec hold 2' x 3 sec hold 1.5' 1.5'   LTR  2'  20xea 20xea    Lumbar extension   Standing x15 2x10 (hold for future) Standing 2x10                           Neuro Re-Ed        Nerve glides  2x10 supine 10x5 ankle pumps 20x5 ankle pumps 1.5' w/ankle pumps   Hamstring stretch 10x10\" seated 10x10\" 5x20\" supine 5x20\" supine 4x20\"   Piriformis stretch  3x20\" 5x15\"  4x20\"   Glute bridges  2x10 2x10 2x10    Clamshells RTB seated 20x    S/L RTB   Belly press   20x3\" w/PB " "20x3\" w/PB    Stir the pot        Paloff press W/lateral walk RTB 3 laps       PPT   15x3\" 20x3\" w/ biofeedback    Seated lumbar flexion Ball roll out 10x10\"   2x10    Supine march w/TVA        Re-evaluation              Ther Act             Step ups          6\" 2x10                                 Modalities             Heat  8'                                           "

## 2025-01-06 ENCOUNTER — APPOINTMENT (OUTPATIENT)
Dept: PHYSICAL THERAPY | Facility: CLINIC | Age: 65
End: 2025-01-06
Payer: COMMERCIAL

## 2025-01-08 ENCOUNTER — OFFICE VISIT (OUTPATIENT)
Dept: PHYSICAL THERAPY | Facility: CLINIC | Age: 65
End: 2025-01-08
Payer: COMMERCIAL

## 2025-01-08 DIAGNOSIS — M25.551 RIGHT HIP PAIN: ICD-10-CM

## 2025-01-08 DIAGNOSIS — M54.16 LUMBAR RADICULOPATHY: Primary | ICD-10-CM

## 2025-01-08 PROCEDURE — 97112 NEUROMUSCULAR REEDUCATION: CPT

## 2025-01-08 PROCEDURE — 97110 THERAPEUTIC EXERCISES: CPT

## 2025-01-08 PROCEDURE — 97140 MANUAL THERAPY 1/> REGIONS: CPT

## 2025-01-08 NOTE — PROGRESS NOTES
"Daily Note     Today's date: 2025  Patient name: Maria Del Carmen Nice  : 1960  MRN: [id number]  Referring provider: Jessie Chauhan*  Dx:   Encounter Diagnosis     ICD-10-CM    1. Lumbar radiculopathy  M54.16       2. Right hip pain  M25.551                      Subjective: Patient states her R glute has been bothering her. She didn't have to work today but she was driving and felt it there.       Objective: See treatment diary below      Assessment: Tolerated treatment well. Patient with increased glute pain during nustep so therapist will discontinue active warm up fo rnow. Patient had good tolerance to new exercises today including standing hip abd/ext. Patient required moderate cueing to perform PPT correctly.  Patient would benefit from continued PT      Plan: Continue per plan of care.      Precautions: GERD, lumbar radiculopathy currently receiving PT      Diagnosis:    Precautions:    Primary Goals:    [employer] Access Code:   *asterisks by exercise = given for HEP   Manuals    Hip STM prn Glute w/threagun - AA Glute AA R Glute (foam roll) -AA  R glute in S/L tharagun - AA   Hip PROM        Lumbar        Piriformis stretch  4x20\" AA              There Ex        Active warmup Nustep 5'; p! P!, Discontinue NV Nustep 5' L1 NV   Hip add 20x3\" 20x3\" Seated 20x3\" 1.5' x 3 sec hold 1.5' x 3 sec hold   Hip abd  Iso 20x3\" Iso 20x3\" 1.5' x 3 sec hold 1.5 x 3 sec hold   DKTC  2' x 3 sec hold 2' x 3 sec hold 1.5' 1.5'   LTR   20xea 20xea    Lumbar extension   Standing x15 2x10 (hold for future) Standing 2x10   Standing hip abd/ext  YTB 20xea      Hamstring curl  GTB 20x RLE              Neuro Re-Ed        Nerve glides  2x10 supine 10x5 ankle pumps 20x5 ankle pumps 1.5' w/ankle pumps   Hamstring stretch 10x10\" seated 10x10\" 5x20\" supine 5x20\" supine 4x20\"   Piriformis stretch   5x15\"  4x20\"   Glute bridges  2x10 2x10 2x10    Clamshells RTB seated 20x    S/L RTB   Belly press   " "20x3\" w/PB 20x3\" w/PB    Stir the pot        Paloff press W/lateral walk RTB 3 laps       PPT  20x3\" 15x3\" 20x3\" w/ biofeedback    Seated lumbar flexion Ball roll out 10x10\"   2x10    Supine march w/TVA        Re-evaluation              Ther Act             Step ups          6\" 2x10                                 Modalities             Heat                                               "

## 2025-01-09 ENCOUNTER — APPOINTMENT (OUTPATIENT)
Dept: PHYSICAL THERAPY | Facility: CLINIC | Age: 65
End: 2025-01-09
Payer: COMMERCIAL

## 2025-01-13 ENCOUNTER — OFFICE VISIT (OUTPATIENT)
Dept: PHYSICAL THERAPY | Facility: CLINIC | Age: 65
End: 2025-01-13
Payer: COMMERCIAL

## 2025-01-13 DIAGNOSIS — M54.16 LUMBAR RADICULOPATHY: Primary | ICD-10-CM

## 2025-01-13 DIAGNOSIS — M25.551 RIGHT HIP PAIN: ICD-10-CM

## 2025-01-13 PROCEDURE — 97112 NEUROMUSCULAR REEDUCATION: CPT

## 2025-01-13 PROCEDURE — 97110 THERAPEUTIC EXERCISES: CPT

## 2025-01-13 PROCEDURE — 97140 MANUAL THERAPY 1/> REGIONS: CPT

## 2025-01-13 NOTE — PROGRESS NOTES
"Daily Note     Today's date: 2025  Patient name: Maria Del Carmen Nice  : 1960  MRN: [id number]  Referring provider: Jessie Chauhan*  Dx:   Encounter Diagnosis     ICD-10-CM    1. Lumbar radiculopathy  M54.16       2. Right hip pain  M25.551                      Subjective: Patient states glute hasn't been as bad lately.      Objective: See treatment diary below      Assessment: Tolerated treatment well. Continued with POC focused on glute stretching and hip strengthening. Some mild fatigue noted with exercises activiting hip abductors and external rotators. However, no increase in R glute symptoms during or post tx. Will progress patient next visit. Patient would benefit from continued PT      Plan: Continue per plan of care.  Progress treatment as tolerated.       Precautions: GERD, lumbar radiculopathy currently receiving PT      Diagnosis:    Precautions:    Primary Goals:    [employer] Access Code:   *asterisks by exercise = given for HEP   Manuals    Hip STM prn Glute w/threagun - AA Glute AA R Glute (foam roll)   R glute in S/L tharagun - AA   Hip PROM        Lumbar        Piriformis stretch  4x20\" AA 4x20\" AA             There Ex        Active warmup Nustep 5'; p! P!, Discontinue On hold Nustep 5' L1 NV   Hip add 20x3\" 20x3\" Supine 20x3\" 1.5' x 3 sec hold 1.5' x 3 sec hold   Hip abd  Iso 20x3\" Iso 20x3\" 1.5' x 3 sec hold 1.5 x 3 sec hold   DKTC  2' x 3 sec hold 20x 1.5' 1.5'   LTR    20xea    Lumbar extension    2x10 (hold for future) Standing 2x10   Standing hip abd/ext  YTB 20xea YTB 20xea     Hamstring curl  GTB 20x RLE GTB 20x RLE             Neuro Re-Ed        Nerve glides  2x10 supine 1' supine 20x5 ankle pumps 1.5' w/ankle pumps   Hamstring stretch 10x10\" seated 10x10\" 4x20 supine 5x20\" supine 4x20\"   Piriformis stretch   Glute stretch @ step 10x10\"  4x20\"   Glute bridges  2x10 2x10 2x10    Clamshells RTB seated 20x  RTB 2x10   S/L RTB   Belly press    20x3\" w/PB " "   Stir the pot        Paloff press W/lateral walk RTB 3 laps       PPT  20x3\"  20x3\" w/ biofeedback    Seated lumbar flexion Ball roll out 10x10\"  Ball roll out 10x10\" 2x10    Supine march w/TVA        Re-evaluation              Ther Act             Step ups      6\" 2x10    6\" 2x10                                 Modalities             Heat                                                 "

## 2025-01-16 ENCOUNTER — APPOINTMENT (OUTPATIENT)
Dept: PHYSICAL THERAPY | Facility: CLINIC | Age: 65
End: 2025-01-16
Payer: COMMERCIAL

## 2025-01-22 ENCOUNTER — OFFICE VISIT (OUTPATIENT)
Dept: PHYSICAL THERAPY | Facility: CLINIC | Age: 65
End: 2025-01-22
Payer: COMMERCIAL

## 2025-01-22 DIAGNOSIS — M54.16 LUMBAR RADICULOPATHY: Primary | ICD-10-CM

## 2025-01-22 DIAGNOSIS — M25.551 RIGHT HIP PAIN: ICD-10-CM

## 2025-01-22 PROCEDURE — 97110 THERAPEUTIC EXERCISES: CPT

## 2025-01-22 PROCEDURE — 97112 NEUROMUSCULAR REEDUCATION: CPT

## 2025-01-22 NOTE — PROGRESS NOTES
"Daily Note     Today's date: 2025  Patient name: Maria Del Carmen Nice  : 1960  MRN: [id number]  Referring provider: Jessie Chauhan*  Dx:   Encounter Diagnosis     ICD-10-CM    1. Lumbar radiculopathy  M54.16       2. Right hip pain  M25.551                      Subjective: Patient reports today is not an okay day for the hip/glute. States she would like to do exercises in upright position due to just having eaten lunch.       Objective: See treatment diary below      Assessment: Tolerated treatment fair. All exercises completing in seated position today as per patient's request. Attempted nustep, however patient only able to tolerate about 2' prior to onset of pain. Educated patient on doing stretching exercises after work to help alleviate symptoms. Updated patient's HEP with those exercises and reviewed toward the end of session. Patient would benefit from continued PT      Plan: Continue per plan of care.      Precautions: GERD, lumbar radiculopathy currently receiving PT      Diagnosis:    Precautions:    Primary Goals:    [facility] Access Code:   *asterisks by exercise = given for HEP   Manuals    Hip STM prn Glute w/threagun - AA Glute AA R Glute (foam roll)   R glute in S/L tharagun - AA   Hip PROM        Lumbar        Piriformis stretch  4x20\" AA 4x20\" AA             There Ex        Active warmup Nustep 5'; p! P!, Discontinue On hold 2', p! NV   Hip add 20x3\" 20x3\" Supine 20x3\" 20x3\" hold 1.5' x 3 sec hold   Hip abd  Iso 20x3\" Iso 20x3\" 20x3\" hold 1.5 x 3 sec hold   DKTC  2' x 3 sec hold 20x  1.5'   LTR        Lumbar extension     Standing 2x10   Standing hip abd/ext  YTB 20xea YTB 20xea     Hamstring curl  GTB 20x RLE GTB 20x RLE GTB 20x RLE            Neuro Re-Ed        Nerve glides  2x10 supine 1' supine Seated slump 2x`0 1.5' w/ankle pumps   Hamstring stretch 10x10\" seated 10x10\" 4x20 supine Seated 10x10\" 4x20\"   Piriformis stretch   Glute stretch @ step 10x10\"  " "4x20\"   Glute bridges  2x10 2x10     Clamshells RTB seated 20x  RTB 2x10   S/L RTB   Belly press        Stir the pot        Paloff press W/lateral walk RTB 3 laps       PPT  20x3\"      Seated lumbar flexion Ball roll out 10x10\"  Ball roll out 10x10\"     Seated ER iso    20x3\"    Supine march w/TVA        Re-evaluation              Ther Act             Step ups      6\" 2x10    6\" 2x10                                 Modalities             Heat    10'                                               "

## 2025-01-23 ENCOUNTER — OFFICE VISIT (OUTPATIENT)
Dept: PHYSICAL THERAPY | Facility: CLINIC | Age: 65
End: 2025-01-23
Payer: COMMERCIAL

## 2025-01-23 DIAGNOSIS — R42 DIZZINESS: Primary | ICD-10-CM

## 2025-01-23 PROCEDURE — 97112 NEUROMUSCULAR REEDUCATION: CPT | Performed by: PHYSICAL THERAPIST

## 2025-01-23 NOTE — PROGRESS NOTES
Daily Note     Today's date: 2025  Patient name: Maria Del Carmen Nice  : 1960  MRN: [id number]  Referring provider: Jessie Chauhan*  Dx:   Encounter Diagnosis     ICD-10-CM    1. Dizziness  R42           Start Time: 1530  Stop Time: 1608  Total time in clinic (min): 38 minutes    Subjective: Patient notes that she has been feeling so much better since initial evaluation. Patient notes that she has been able to look upwards without getting dizzy. Patient notes that she has also been at the dentist and when the laid her back she didn't get the spinning. Patient notes that there has maybe been one time in therapy that she felt dizzy upon sitting. Overall has been feeling great.       Objective: See treatment diary below    Positional testing   [place]   Right posterior canal: symptomatic, very mild torsional and up-beating nystagmus  Left posterior canal: negative, asymptomatic     Roll Test:   Right horizontal: negative, asymptomatic   Left horizontal: negative, asymptomatic     Assessment: Tolerated treatment well. [place] was performed and demonstrated positive R canalithiasis BPPV. CRT was performed 3x with improvement in symptoms. Mild symptoms upon transitions into sitting but resolved quickly.  Patient will monitor symptoms over the next few weeks and if symptoms worsen will reschedule. Patient would benefit from continued PT      Plan: Continue per plan of care.         Precautions: GERD, lumbar radiculopathy currently receiving PT      Manuals                                                                 Neuro Re-Ed             CRT  2 Eply PC and 1 Li PC                                                                                           Ther Ex                                                                                                                     Ther Activity                                       Gait Training                                       Modalities

## 2025-01-27 ENCOUNTER — OFFICE VISIT (OUTPATIENT)
Dept: PHYSICAL THERAPY | Facility: CLINIC | Age: 65
End: 2025-01-27
Payer: COMMERCIAL

## 2025-01-27 DIAGNOSIS — M54.16 LUMBAR RADICULOPATHY: Primary | ICD-10-CM

## 2025-01-27 DIAGNOSIS — M25.551 RIGHT HIP PAIN: ICD-10-CM

## 2025-01-27 PROCEDURE — 97110 THERAPEUTIC EXERCISES: CPT

## 2025-01-27 PROCEDURE — 97112 NEUROMUSCULAR REEDUCATION: CPT

## 2025-01-27 NOTE — PROGRESS NOTES
"Daily Note     Today's date: 2025  Patient name: Maria Del Carmen Nice  : 1960  MRN: [id number]  Referring provider: Jessie Chauhan*  Dx:   Encounter Diagnosis     ICD-10-CM    1. Lumbar radiculopathy  M54.16       2. Right hip pain  M25.551           Start Time: 1515  Stop Time: 1600  Total time in clinic (min): 45 minutes    Subjective: Patient states yesterday she had a lot of pain and she barely did anything. States she was sitting on a hard chair and finds that sitting for a long time aggravates her symptoms. She finds relief with standing, however if she stands for too long that also bothers it.       Objective: See treatment diary below      Assessment: Tolerated treatment well. Patient was very TTP at the glute med and glute max muscles. No tenderness at greater troch. She did have some mild pain with S/L clamshell AROM and seated external rotation isometrics. No pain at the glute med with SLS. Re-educated patient on diagnosis and that is likely nerve related with patient experiencing referred pain. Encouraged patient to do standing press ups throughout the day. Patient would benefit from continued PT      Plan: Continue per plan of care.      Precautions: GERD, lumbar radiculopathy currently receiving PT    Patient 1:1 from 3:15-3:37  Diagnosis:    Precautions:    Primary Goals:    [employer] Access Code:   *asterisks by exercise = given for HEP   Manuals    Hip STM prn Glute w/threagun - AA Glute AA R Glute (foam roll)   R glute in S/L tharagun - AA   Hip PROM        Lumbar        Piriformis stretch  4x20\" AA 4x20\" AA             There Ex        Active warmup Nustep 5'; p! P!, Discontinue On hold 2', p!    Hip add 20x3\" 20x3\" Supine 20x3\" 20x3\" hold 20x3\"   Hip abd  Iso 20x3\" Iso 20x3\" 20x3\" hold 20x3\"   DKTC  2' x 3 sec hold 20x  2x10   LTR        Lumbar extension     Standing 2x10   Standing hip abd/ext  YTB 20xea YTB 20xea     Hamstring curl  GTB 20x RLE GTB 20x RLE " "GTB 20x RLE GTB 2x10 RLE           Neuro Re-Ed        Nerve glides  2x10 supine 1' supine Seated slump 2x`0 Supine 2x10   Hamstring stretch 10x10\" seated 10x10\" 4x20 supine Seated 10x10\" Supine 10x10\"   Piriformis stretch   Glute stretch @ step 10x10\"     Glute bridges  2x10 2x10  2x10   Clamshells RTB seated 20x  RTB 2x10   S/L x5, p!   Belly press        Stir the pot     20x3\"   Paloff press W/lateral walk RTB 3 laps    RTB 15x5\"   PPT  20x3\"   20x3\"   Seated lumbar flexion Ball roll out 10x10\"  Ball roll out 10x10\"     Seated ER iso    20x3\" 20x3\"   Stir the pot     10 cw/10ccw   Supine march w/TVA     x15ea   Re-evaluation              Ther Act             Step ups      6\" 2x10                                    Modalities             Heat    10'                                               "

## 2025-01-31 ENCOUNTER — OFFICE VISIT (OUTPATIENT)
Dept: PHYSICAL THERAPY | Facility: CLINIC | Age: 65
End: 2025-01-31
Payer: COMMERCIAL

## 2025-01-31 DIAGNOSIS — M25.551 RIGHT HIP PAIN: ICD-10-CM

## 2025-01-31 DIAGNOSIS — M54.16 LUMBAR RADICULOPATHY: Primary | ICD-10-CM

## 2025-01-31 PROCEDURE — 97112 NEUROMUSCULAR REEDUCATION: CPT

## 2025-01-31 PROCEDURE — 97140 MANUAL THERAPY 1/> REGIONS: CPT

## 2025-01-31 PROCEDURE — 97110 THERAPEUTIC EXERCISES: CPT

## 2025-01-31 NOTE — PROGRESS NOTES
"Daily Note     Today's date: 2025  Patient name: Maria Del Carmen Nice  : 1960  MRN: [id number]  Referring provider: Jessie Chauhan*  Dx:   Encounter Diagnosis     ICD-10-CM    1. Lumbar radiculopathy  M54.16       2. Right hip pain  M25.551                      Subjective: Patient states she did not has not had any pain since our last session. She was able to work back to back 5 hour days at work without any issues.       Objective: See treatment diary below      Assessment: Tolerated treatment well. Continued with POC focused on lumbar mobility, core, and LE strengthening. Patient still with tenderness at the glutes, however had minimal to no pain t/o session. Improved tolerance to stir the pot exercises today. Patient would benefit from continued PT      Plan: Continue per plan of care.      Precautions: GERD, lumbar radiculopathy currently receiving PT    Patient 1:1 from 9:  Diagnosis:    Precautions:    Primary Goals:    [employer] Access Code:   *asterisks by exercise = given for HEP   Manuals    Hip STM prn Glutes - AA Glute AA R Glute (foam roll)   R glute in S/L tharagun - AA   Hip PROM        Lumbar        Piriformis stretch AA 4x20\" 4x20\" AA 4x20\" AA             There Ex        Active warmup  P!, Discontinue On hold 2', p!    Hip add 20x3\" 20x3\" Supine 20x3\" 20x3\" hold 20x3\"   Hip abd 20x3\" Iso 20x3\" Iso 20x3\" 20x3\" hold 20x3\"   DKTC 20x3\" 2' x 3 sec hold 20x  2x10   LTR        Lumbar extension Standing 2x10    Standing 2x10   Standing hip abd/ext  YTB 20xea YTB 20xea     Hamstring curl GTB 20x RLE GTB 20x RLE GTB 20x RLE GTB 20x RLE GTB 2x10 RLE           Neuro Re-Ed        Nerve glides Supine 2x10 2x10 supine 1' supine Seated slump 2x`0 Supine 2x10   Hamstring stretch Supine 10x10\" 10x10\" 4x20 supine Seated 10x10\" Supine 10x10\"   Piriformis stretch   Glute stretch @ step 10x10\"     Glute bridges 2x10 2x10 2x10  2x10   Clamshells   RTB 2x10   S/L x5, p! " "  Belly press        Stir the pot     20x3\"   Paloff press RTB 15x5\"    RTB 15x5\"   PPT  20x3\"   20x3\"   Seated lumbar flexion   Ball roll out 10x10\"     Seated ER iso 20x3\"   20x3\" 20x3\"   Stir the pot 15 cw/ccw ea    10 cw/10ccw   Supine march w/TVA NV    x15ea   Re-evaluation              Ther Act             Step ups      6\" 2x10                                    Modalities             Heat    10'                                                 "

## 2025-02-04 ENCOUNTER — OFFICE VISIT (OUTPATIENT)
Dept: PHYSICAL THERAPY | Facility: CLINIC | Age: 65
End: 2025-02-04
Payer: COMMERCIAL

## 2025-02-04 DIAGNOSIS — M54.16 LUMBAR RADICULOPATHY: Primary | ICD-10-CM

## 2025-02-04 DIAGNOSIS — M25.551 RIGHT HIP PAIN: ICD-10-CM

## 2025-02-04 PROCEDURE — 97140 MANUAL THERAPY 1/> REGIONS: CPT

## 2025-02-04 PROCEDURE — 97112 NEUROMUSCULAR REEDUCATION: CPT

## 2025-02-04 PROCEDURE — 97110 THERAPEUTIC EXERCISES: CPT

## 2025-02-04 NOTE — PROGRESS NOTES
"Daily Note     Today's date: 2025  Patient name: Maria Del Carmen Nice  : 1960  MRN: [id number]  Referring provider: Jessie Chauhan*  Dx:   Encounter Diagnosis     ICD-10-CM    1. Lumbar radiculopathy  M54.16       2. Right hip pain  M25.551                      Subjective: Patient states she felt a little twinge since last session but nothing like how it was before.       Objective: See treatment diary below      Assessment: Tolerated treatment well. Decreased tenderness to the glutes during STM today.  Added in IR iso today without any issues. Patient with improved tolerance to standing exercises. Patient exhibited good technique with therapeutic exercises and would benefit from continued PT      Plan: Continue per plan of care.  Decreased frequency to 1x/week.      Precautions: GERD, lumbar radiculopathy currently receiving PT    Diagnosis:    Precautions:    Primary Goals:    [employer] Access Code:   *asterisks by exercise = given for HEP   Manuals 1/31 2/3 1/13 1/22 1/27   Hip STM prn Glutes - AA Glute AA R Glute (foam roll)   R glute in S/L tharagun - AA   Hip PROM        Lumbar        Piriformis stretch AA 4x20\"  4x20\" AA             There Ex        Active warmup   On hold 2', p!    Hip add 20x3\" 20x3\" Supine 20x3\" 20x3\" hold 20x3\"   Hip abd 20x3\" Iso 20x3\" Iso 20x3\" 20x3\" hold 20x3\"   DKTC 20x3\" 20x3\" 20x  2x10   LTR        Lumbar extension Standing 2x10 NV   Standing 2x10   Standing hip abd/ext   YTB 20xea     Hamstring curl GTB 20x RLE GTB 20x RLE GTB 20x RLE GTB 20x RLE GTB 2x10 RLE           Neuro Re-Ed        Nerve glides Supine 2x10 2x10 supine 1' supine Seated slump 2x`0 Supine 2x10   Hamstring stretch Supine 10x10\"  4x20 supine Seated 10x10\" Supine 10x10\"   Piriformis stretch   Glute stretch @ step 10x10\"     Glute bridges 2x10 2x10 2x10  2x10   Clamshells   RTB 2x10   S/L x5, p!   Belly press        Stir the pot     20x3\"   Paloff press RTB 15x5\" RTB 15x5\" ea   RTB 15x5\"   PPT  20x3\"  " " 20x3\"   Seated lumbar flexion   Ball roll out 10x10\"     Seated ER iso 20x3\" ER+IR 20x3\"  20x3\" 20x3\"   Stir the pot 15 cw/ccw ea 15 cw/ccw ea RTB   10 cw/10ccw   Supine march w/TVA NV    x15ea   Re-evaluation              Ther Act             Step ups      6\" 2x10                                    Modalities             Heat    10'                                                   "

## 2025-02-06 ENCOUNTER — APPOINTMENT (OUTPATIENT)
Dept: PHYSICAL THERAPY | Facility: CLINIC | Age: 65
End: 2025-02-06
Payer: COMMERCIAL

## 2025-02-11 DIAGNOSIS — I10 ESSENTIAL HYPERTENSION: ICD-10-CM

## 2025-02-12 RX ORDER — TRIAMTERENE AND HYDROCHLOROTHIAZIDE 37.5; 25 MG/1; MG/1
1 TABLET ORAL DAILY
Qty: 90 TABLET | Refills: 1 | Status: SHIPPED | OUTPATIENT
Start: 2025-02-12

## 2025-02-13 ENCOUNTER — OFFICE VISIT (OUTPATIENT)
Dept: PHYSICAL THERAPY | Facility: CLINIC | Age: 65
End: 2025-02-13
Payer: COMMERCIAL

## 2025-02-13 DIAGNOSIS — M54.16 LUMBAR RADICULOPATHY: Primary | ICD-10-CM

## 2025-02-13 DIAGNOSIS — M25.551 RIGHT HIP PAIN: ICD-10-CM

## 2025-02-13 PROCEDURE — 97140 MANUAL THERAPY 1/> REGIONS: CPT

## 2025-02-13 PROCEDURE — 97110 THERAPEUTIC EXERCISES: CPT

## 2025-02-13 PROCEDURE — 97112 NEUROMUSCULAR REEDUCATION: CPT

## 2025-02-13 NOTE — PROGRESS NOTES
"Daily Note     Today's date: 2025  Patient name: Maria Del Carmen Nice  : 1960  MRN: [id number]  Referring provider: Jessie Chauhan*  Dx:   Encounter Diagnosis     ICD-10-CM    1. Lumbar radiculopathy  M54.16       2. Right hip pain  M25.551           Start Time: 905  Stop Time: 940  Total time in clinic (min): 35 minutes    Subjective: Patient states she's been doing okay. States more recently, she has been having pain around the R hip bone but she has found that massaging the area helps a lot.       Objective: See treatment diary below      Assessment: Tolerated treatment well. Patient had some tenderness around greater troch today. No pain with isometrically carlin glute med and hip abd muscles. Able to progress standing core exercises without any adverse response. Patient would benefit from continued PT      Plan: Discharge next session.      Precautions: GERD, lumbar radiculopathy currently receiving PT    Diagnosis:    Precautions:    Primary Goals:    [employer] Access Code:   *asterisks by exercise = given for HEP   Manuals 1/31 2/3 2/13 1/22 1/27   Hip STM prn Glutes - AA Glute AA Glute med/greater troch - AA  R glute in S/L tharagun - AA   Hip PROM        Lumbar        Piriformis stretch AA 4x20\"               There Ex        Active warmup    2', p!    Hip add 20x3\" 20x3\" 20x3\" 20x3\" hold 20x3\"   Hip abd 20x3\" Iso 20x3\"  20x3\" hold 20x3\"   DKTC 20x3\" 20x3\"   2x10   LTR        Lumbar extension Standing 2x10 NV   Standing 2x10   Standing hip abd/ext        Hamstring curl GTB 20x RLE GTB 20x RLE BTB 2x10ea GTB 20x RLE GTB 2x10 RLE           Neuro Re-Ed        Nerve glides Supine 2x10 2x10 supine 2x10 Seated slump 2x`0 Supine 2x10   Hamstring stretch Supine 10x10\"  Supine 10x10\" Seated 10x10\" Supine 10x10\"   Piriformis stretch        Glute bridges 2x10 2x10 2x10 w/hip abd iso  2x10   Clamshells   S/L iso 2x10x3\"  S/L x5, p!   Belly press        Stir the pot     20x3\"   Paloff press RTB " "15x5\" RTB 15x5\" ea BTB 15x3\" ea  RTB 15x5\"   PPT  20x3\" 20x3\"  20x3\"   Seated lumbar flexion        Seated ER iso 20x3\" ER+IR 20x3\"  20x3\" 20x3\"   Stir the pot 15 cw/ccw ea 15 cw/ccw ea RTB 15 cw/15ccw BTB  10 cw/10ccw   SLR   2x10     Supine march w/TVA NV    x15ea   Re-evaluation              Ther Act             Step ups                                         Modalities             Heat    10'                                                     "

## 2025-02-18 DIAGNOSIS — I10 ESSENTIAL HYPERTENSION: ICD-10-CM

## 2025-02-19 LAB — COLOGUARD RESULT REPORTABLE: NEGATIVE

## 2025-02-19 RX ORDER — METOPROLOL SUCCINATE 50 MG/1
50 TABLET, EXTENDED RELEASE ORAL EVERY MORNING
Qty: 90 TABLET | Refills: 1 | Status: SHIPPED | OUTPATIENT
Start: 2025-02-19

## 2025-02-20 ENCOUNTER — OFFICE VISIT (OUTPATIENT)
Dept: PHYSICAL THERAPY | Facility: CLINIC | Age: 65
End: 2025-02-20
Payer: COMMERCIAL

## 2025-02-20 ENCOUNTER — APPOINTMENT (OUTPATIENT)
Dept: PHYSICAL THERAPY | Facility: CLINIC | Age: 65
End: 2025-02-20
Payer: COMMERCIAL

## 2025-02-20 DIAGNOSIS — M25.551 RIGHT HIP PAIN: Primary | ICD-10-CM

## 2025-02-20 DIAGNOSIS — M54.16 LUMBAR RADICULOPATHY: ICD-10-CM

## 2025-02-20 PROCEDURE — 97110 THERAPEUTIC EXERCISES: CPT

## 2025-02-20 PROCEDURE — 97112 NEUROMUSCULAR REEDUCATION: CPT

## 2025-02-20 PROCEDURE — 97535 SELF CARE MNGMENT TRAINING: CPT

## 2025-02-20 NOTE — PROGRESS NOTES
PT Discharge    Today's date: 25  Patient name: Maria Del Carmen Nice  : 1960  MRN: [id number]  Referring provider: Jessie Chauhan*  Dx:   1. Right hip pain    2. Lumbar radiculopathy          ASSESSMENT:  Maria Del Carmen Nice is a 64 y.o. female who presents with signs and symptoms consistent with lumbar radiculopathy. Patient presents to discharge with improvements in pain, LE strength, and activity tolerance. Patient subjectively reports improvements in pain down the LE and at the glutes with only one bout occurring in the last couple of weeks. Passive SLR negative bilaterally. However, patient's primary limiting factor continues to be pain that is now localized to the greater trochanter and aggravated by prolonged standing. Despite that, she has met most of short and long term goals. She is able to complete most of her adl/ialds. Therefore, patient is a good candidate for discharge and transition to HEP at this time. Patient was provided a comprehensive HEP for maintaining gains made in PT as well as for symptom management.       Impairments:    restricted ROM    decreased strength   pain with function   activity intolerance     Prognosis:  Good  Positive and negative prognostic indicator(s):  pain >3 months    Goals:    STG (to be met in 4 weeks)  Patient will be independent with basic HEP for self-management. - MET  Patient will improve lumbar ROM by 5-10 degrees in all deficient planes. - MET  Patient will improve LE strength by 1/2 MMT grade in all deficient planes. - MET  Patient will report decreased pain by 25% at its worst. - MET    LTG (to be met by discharge):  Patient will be independent with comprehensive HEP for maintenance after discharge. - MET  Patient will improve FOTO score to equal to or above predicted value. - MET  Patient will improve LE strength to 5/5 MMT grade to assist with adl/iadls. - NOT MET  Patient will be able to perform work duties without pain/restriction. -  PARTIALLY MET (patient has days where she is able to work pain-free of R hip but is inconsistent)      Planned interventions:  home exercise program    Duration in visits:  n/a - D/C  Frequency: n/a - D/C  Duration in weeks:  n/a - D/C    History of Current Injury:   Discharge: Patient states over the past week, she has been okay. States she did have some pain at work by the end of her 5 hour shift. She states she went home and did some of the exercises which helped. Pain did not linger into the next day. States pain mostly comes on with standing for long periods of time. She reports it being inconsistent - states some days she can work without feeling any pain of the R hip and then others she has pain. However, pt states overall intensity of it has decreased since evaluation. She no longer is getting pain down to the knee consistently (only 1 bout within the last week after work). Pain is also no longer at the glute but rather on the side right near the hip bone.     Initial evaluation: Patient has been dealing with low back/posterior hip pain for about 6 months, potentially longer. Patient states since the onset, it has been up and down. Patient does not see a pattern with pain. She does notice sometimes it's worse when the weather is bad. States it starts right at the center of the R buttock. Other times it's the side of the hip and radiates down. Denies numbness and tingling. Patient denies interference with sleep. When pain comes on, it takes about an 1/2 hour to settle down. States sitting for long periods does not bother her. It's moreso prolonged standing.    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     Tenderness     Right Hip   Tenderness in the greater trochanter.     Active Range of Motion     Lumbar   Flexion:  Restriction level: minimal  Extension:  Restriction level: minimal  Left lateral flexion:  WFL  Right lateral flexion:  WFL  Left rotation:  WFL  Right rotation:  WFL    Strength/Myotome Testing     Left Hip   Planes of Motion   Flexion: 5  External rotation: 5    Right Hip   Planes of Motion   Flexion: 5  Abduction: 4+  External rotation: 5    Isolated Muscles   Gluteus medius: 4+    Additional Strength Details  No pain with resisted external rotation in sidelying    Tests     Lumbar     Right   Negative passive SLR.     Right Hip   Negative ANA and FADIR.             Precautions: hypertension      Diagnosis:    Precautions:    Primary Goals:    [employer] Access Code:   *asterisks by exercise = given for HEP   Manuals 1/31 2/3 2/13 2/20 1/27   Hip STM prn Glutes - AA Glute AA Glute med/greater troch - AA  R glute in S/L tharagun - AA   Hip PROM        Lumbar        Piriformis stretch AA 4x20\"               There Ex        Active warmup        Hip add 20x3\" 20x3\" 20x3\"  20x3\"   Hip abd 20x3\" Iso 20x3\"   20x3\"   DKTC 20x3\" 20x3\"   2x10   LTR        Lumbar extension Standing 2x10 NV  HEP Standing 2x10   Standing hip abd/ext        Hamstring curl GTB 20x RLE GTB 20x RLE BTB 2x10ea  GTB 2x10 RLE           Neuro Re-Ed        Nerve glides Supine 2x10 2x10 supine 2x10 HEP Supine 2x10   Hamstring stretch Supine 10x10\"  Supine 10x10\" HEP Supine 10x10\"   Piriformis stretch    HEP    Glute bridges 2x10 2x10 2x10 w/hip abd iso HEP 2x10   Clamshells   S/L iso 2x10x3\"  S/L x5, p!   Belly press        Stir the pot     20x3\"   Paloff press RTB 15x5\" RTB 15x5\" ea BTB 15x3\" ea  RTB 15x5\"   PPT  20x3\" 20x3\" " " 20x3\"   Seated lumbar flexion        Seated ER iso 20x3\" ER+IR 20x3\"   20x3\"   Stir the pot 15 cw/ccw ea 15 cw/ccw ea RTB 15 cw/15ccw BTB  10 cw/10ccw   SLR   2x10 HEP    Supine march w/TVA NV    x15ea   Re-evaluation             Ther Act            Step ups                                       Modalities            Heat                                        "

## 2025-03-20 DIAGNOSIS — J30.2 SEASONAL ALLERGIES: ICD-10-CM

## 2025-03-21 RX ORDER — FLUTICASONE PROPIONATE 50 MCG
SPRAY, SUSPENSION (ML) NASAL
Qty: 48 ML | Refills: 1 | Status: SHIPPED | OUTPATIENT
Start: 2025-03-21

## 2025-04-16 ENCOUNTER — TELEPHONE (OUTPATIENT)
Age: 65
End: 2025-04-16

## 2025-04-16 NOTE — TELEPHONE ENCOUNTER
Patient called in to see how many refills are on medications and why no automatically refilled.NFA at this time

## 2025-04-21 DIAGNOSIS — I10 ESSENTIAL HYPERTENSION: ICD-10-CM

## 2025-04-22 RX ORDER — METOPROLOL SUCCINATE 50 MG/1
50 TABLET, EXTENDED RELEASE ORAL EVERY MORNING
Qty: 90 TABLET | Refills: 1 | Status: SHIPPED | OUTPATIENT
Start: 2025-04-22

## 2025-08-08 ENCOUNTER — HOSPITAL ENCOUNTER (OUTPATIENT)
Dept: RADIOLOGY | Age: 65
Discharge: HOME | End: 2025-08-08
Payer: COMMERCIAL

## 2025-08-08 DIAGNOSIS — Z12.31 VISIT FOR SCREENING MAMMOGRAM: ICD-10-CM

## 2025-08-08 PROCEDURE — 77063 BREAST TOMOSYNTHESIS BI: CPT

## 2025-08-08 PROCEDURE — 77067 SCR MAMMO BI INCL CAD: CPT

## (undated) DEVICE — PREMIUM DRY TRAY LF: Brand: MEDLINE INDUSTRIES, INC.

## (undated) DEVICE — STRL ALLENTOWN HYSTEROSCOPY PK: Brand: CARDINAL HEALTH

## (undated) DEVICE — STERILE 8 INCH PROCTO SWAB: Brand: CARDINAL HEALTH

## (undated) DEVICE — GLOVE SRG BIOGEL 7

## (undated) DEVICE — CYSTO TUBING SINGLE IRRIGATION

## (undated) DEVICE — GLOVE INDICATOR PI UNDERGLOVE SZ 7 BLUE

## (undated) DEVICE — IV EXTENSION TUBING 33 IN

## (undated) DEVICE — 2000CC GUARDIAN II: Brand: GUARDIAN

## (undated) DEVICE — STRAIGHT CATH RED RUBBER 12FR

## (undated) DEVICE — SCD SEQUENTIAL COMPRESSION COMFORT SLEEVE MEDIUM KNEE LENGTH: Brand: KENDALL SCD